# Patient Record
Sex: FEMALE | Race: BLACK OR AFRICAN AMERICAN | NOT HISPANIC OR LATINO | URBAN - METROPOLITAN AREA
[De-identification: names, ages, dates, MRNs, and addresses within clinical notes are randomized per-mention and may not be internally consistent; named-entity substitution may affect disease eponyms.]

---

## 2022-01-01 ENCOUNTER — INPATIENT (INPATIENT)
Facility: HOSPITAL | Age: 67
LOS: 8 days | Discharge: EXPIRED | End: 2022-12-24
Attending: INTERNAL MEDICINE | Admitting: INTERNAL MEDICINE
Payer: MEDICAID

## 2022-01-01 ENCOUNTER — EMERGENCY (EMERGENCY)
Facility: HOSPITAL | Age: 67
LOS: 0 days | Discharge: ANOTHER HOSPITAL | End: 2022-12-15
Attending: EMERGENCY MEDICINE

## 2022-01-01 VITALS
SYSTOLIC BLOOD PRESSURE: 104 MMHG | RESPIRATION RATE: 20 BRPM | DIASTOLIC BLOOD PRESSURE: 61 MMHG | TEMPERATURE: 98 F | HEART RATE: 84 BPM | OXYGEN SATURATION: 95 %

## 2022-01-01 VITALS
WEIGHT: 160.06 LBS | RESPIRATION RATE: 18 BRPM | DIASTOLIC BLOOD PRESSURE: 71 MMHG | HEIGHT: 67 IN | TEMPERATURE: 97 F | HEART RATE: 93 BPM | OXYGEN SATURATION: 100 % | SYSTOLIC BLOOD PRESSURE: 108 MMHG

## 2022-01-01 VITALS
TEMPERATURE: 97 F | RESPIRATION RATE: 16 BRPM | DIASTOLIC BLOOD PRESSURE: 60 MMHG | SYSTOLIC BLOOD PRESSURE: 100 MMHG | OXYGEN SATURATION: 97 % | HEART RATE: 82 BPM

## 2022-01-01 DIAGNOSIS — N17.9 ACUTE KIDNEY FAILURE, UNSPECIFIED: ICD-10-CM

## 2022-01-01 DIAGNOSIS — Z71.89 OTHER SPECIFIED COUNSELING: ICD-10-CM

## 2022-01-01 DIAGNOSIS — K83.8 OTHER SPECIFIED DISEASES OF BILIARY TRACT: ICD-10-CM

## 2022-01-01 DIAGNOSIS — R53.1 WEAKNESS: ICD-10-CM

## 2022-01-01 DIAGNOSIS — E87.5 HYPERKALEMIA: ICD-10-CM

## 2022-01-01 DIAGNOSIS — Z51.5 ENCOUNTER FOR PALLIATIVE CARE: ICD-10-CM

## 2022-01-01 DIAGNOSIS — C55 MALIGNANT NEOPLASM OF UTERUS, PART UNSPECIFIED: ICD-10-CM

## 2022-01-01 DIAGNOSIS — E80.6 OTHER DISORDERS OF BILIRUBIN METABOLISM: ICD-10-CM

## 2022-01-01 DIAGNOSIS — Z90.710 ACQUIRED ABSENCE OF BOTH CERVIX AND UTERUS: ICD-10-CM

## 2022-01-01 DIAGNOSIS — R11.10 VOMITING, UNSPECIFIED: ICD-10-CM

## 2022-01-01 DIAGNOSIS — Z85.42 PERSONAL HISTORY OF MALIGNANT NEOPLASM OF OTHER PARTS OF UTERUS: ICD-10-CM

## 2022-01-01 DIAGNOSIS — E87.20 ACIDOSIS, UNSPECIFIED: ICD-10-CM

## 2022-01-01 DIAGNOSIS — Z90.710 ACQUIRED ABSENCE OF BOTH CERVIX AND UTERUS: Chronic | ICD-10-CM

## 2022-01-01 DIAGNOSIS — C22.1 INTRAHEPATIC BILE DUCT CARCINOMA: ICD-10-CM

## 2022-01-01 DIAGNOSIS — R93.2 ABNORMAL FINDINGS ON DIAGNOSTIC IMAGING OF LIVER AND BILIARY TRACT: ICD-10-CM

## 2022-01-01 DIAGNOSIS — Z20.822 CONTACT WITH AND (SUSPECTED) EXPOSURE TO COVID-19: ICD-10-CM

## 2022-01-01 LAB
-  AMIKACIN: SIGNIFICANT CHANGE UP
-  AMOXICILLIN/CLAVULANIC ACID: SIGNIFICANT CHANGE UP
-  AMPICILLIN/SULBACTAM: SIGNIFICANT CHANGE UP
-  AMPICILLIN: SIGNIFICANT CHANGE UP
-  AZTREONAM: SIGNIFICANT CHANGE UP
-  CEFAZOLIN: SIGNIFICANT CHANGE UP
-  CEFEPIME: SIGNIFICANT CHANGE UP
-  CEFOXITIN: SIGNIFICANT CHANGE UP
-  CEFTRIAXONE: SIGNIFICANT CHANGE UP
-  CIPROFLOXACIN: SIGNIFICANT CHANGE UP
-  ERTAPENEM: SIGNIFICANT CHANGE UP
-  GENTAMICIN: SIGNIFICANT CHANGE UP
-  IMIPENEM: SIGNIFICANT CHANGE UP
-  LEVOFLOXACIN: SIGNIFICANT CHANGE UP
-  MEROPENEM: SIGNIFICANT CHANGE UP
-  NITROFURANTOIN: SIGNIFICANT CHANGE UP
-  PIPERACILLIN/TAZOBACTAM: SIGNIFICANT CHANGE UP
-  TOBRAMYCIN: SIGNIFICANT CHANGE UP
-  TRIMETHOPRIM/SULFAMETHOXAZOLE: SIGNIFICANT CHANGE UP
ACANTHOCYTES BLD QL SMEAR: SLIGHT — SIGNIFICANT CHANGE UP
ALBUMIN SERPL ELPH-MCNC: 1.9 G/DL — LOW (ref 3.3–5)
ALBUMIN SERPL ELPH-MCNC: 2 G/DL — LOW (ref 3.3–5)
ALBUMIN SERPL ELPH-MCNC: 2.1 G/DL — LOW (ref 3.3–5)
ALBUMIN SERPL ELPH-MCNC: 2.1 G/DL — LOW (ref 3.3–5)
ALBUMIN SERPL ELPH-MCNC: 2.2 G/DL — LOW (ref 3.3–5)
ALBUMIN SERPL ELPH-MCNC: 2.3 G/DL — LOW (ref 3.3–5)
ALBUMIN SERPL ELPH-MCNC: 2.3 G/DL — LOW (ref 3.3–5)
ALBUMIN SERPL ELPH-MCNC: 2.4 G/DL — LOW (ref 3.3–5)
ALBUMIN SERPL ELPH-MCNC: 2.6 G/DL — LOW (ref 3.3–5)
ALBUMIN SERPL ELPH-MCNC: 2.7 G/DL — LOW (ref 3.3–5)
ALBUMIN SERPL ELPH-MCNC: 2.8 G/DL — LOW (ref 3.3–5)
ALBUMIN SERPL ELPH-MCNC: 2.8 G/DL — LOW (ref 3.3–5)
ALP SERPL-CCNC: 1003 U/L — HIGH (ref 40–120)
ALP SERPL-CCNC: 1057 U/L — HIGH (ref 40–120)
ALP SERPL-CCNC: 1341 U/L — HIGH (ref 40–120)
ALP SERPL-CCNC: 392 U/L — HIGH (ref 40–120)
ALP SERPL-CCNC: 405 U/L — HIGH (ref 40–120)
ALP SERPL-CCNC: 409 U/L — HIGH (ref 40–120)
ALP SERPL-CCNC: 427 U/L — HIGH (ref 40–120)
ALP SERPL-CCNC: 475 U/L — HIGH (ref 40–120)
ALP SERPL-CCNC: 504 U/L — HIGH (ref 40–120)
ALP SERPL-CCNC: 514 U/L — HIGH (ref 40–120)
ALP SERPL-CCNC: 516 U/L — HIGH (ref 40–120)
ALP SERPL-CCNC: 535 U/L — HIGH (ref 40–120)
ALP SERPL-CCNC: 556 U/L — HIGH (ref 40–120)
ALP SERPL-CCNC: 600 U/L — HIGH (ref 40–120)
ALP SERPL-CCNC: 797 U/L — HIGH (ref 40–120)
ALP SERPL-CCNC: 980 U/L — HIGH (ref 40–120)
ALT FLD-CCNC: 101 U/L — HIGH (ref 4–33)
ALT FLD-CCNC: 1018 U/L — HIGH (ref 4–33)
ALT FLD-CCNC: 109 U/L — HIGH (ref 4–33)
ALT FLD-CCNC: 1378 U/L — HIGH (ref 4–33)
ALT FLD-CCNC: 1794 U/L — HIGH (ref 4–33)
ALT FLD-CCNC: 1946 U/L — HIGH (ref 4–33)
ALT FLD-CCNC: 2015 U/L — HIGH (ref 4–33)
ALT FLD-CCNC: 34 U/L — HIGH (ref 4–33)
ALT FLD-CCNC: 36 U/L — HIGH (ref 4–33)
ALT FLD-CCNC: 36 U/L — HIGH (ref 4–33)
ALT FLD-CCNC: 37 U/L — HIGH (ref 4–33)
ALT FLD-CCNC: 38 U/L — HIGH (ref 4–33)
ALT FLD-CCNC: 41 U/L — HIGH (ref 4–33)
ALT FLD-CCNC: 423 U/L — HIGH (ref 4–33)
ALT FLD-CCNC: 43 U/L — SIGNIFICANT CHANGE UP (ref 12–78)
ALT FLD-CCNC: 47 U/L — SIGNIFICANT CHANGE UP (ref 12–78)
AMMONIA BLD-MCNC: 20 UMOL/L — SIGNIFICANT CHANGE UP (ref 11–55)
AMMONIA BLD-MCNC: 21 UMOL/L — SIGNIFICANT CHANGE UP (ref 11–55)
AMMONIA BLD-MCNC: 28 UMOL/L — SIGNIFICANT CHANGE UP (ref 11–55)
AMMONIA BLD-MCNC: 29 UMOL/L — SIGNIFICANT CHANGE UP (ref 11–55)
AMMONIA BLD-MCNC: 47 UMOL/L — SIGNIFICANT CHANGE UP (ref 11–55)
AMYLASE P1 CFR SERPL: 208 U/L — HIGH (ref 25–115)
ANION GAP SERPL CALC-SCNC: 13 MMOL/L — SIGNIFICANT CHANGE UP (ref 5–17)
ANION GAP SERPL CALC-SCNC: 14 MMOL/L — SIGNIFICANT CHANGE UP (ref 5–17)
ANION GAP SERPL CALC-SCNC: 15 MMOL/L — HIGH (ref 7–14)
ANION GAP SERPL CALC-SCNC: 18 MMOL/L — HIGH (ref 7–14)
ANION GAP SERPL CALC-SCNC: 19 MMOL/L — HIGH (ref 7–14)
ANION GAP SERPL CALC-SCNC: 19 MMOL/L — HIGH (ref 7–14)
ANION GAP SERPL CALC-SCNC: 20 MMOL/L — HIGH (ref 7–14)
ANION GAP SERPL CALC-SCNC: 21 MMOL/L — HIGH (ref 7–14)
ANION GAP SERPL CALC-SCNC: 22 MMOL/L — HIGH (ref 7–14)
ANION GAP SERPL CALC-SCNC: 22 MMOL/L — HIGH (ref 7–14)
ANION GAP SERPL CALC-SCNC: 23 MMOL/L — HIGH (ref 7–14)
ANION GAP SERPL CALC-SCNC: 24 MMOL/L — HIGH (ref 7–14)
ANION GAP SERPL CALC-SCNC: 27 MMOL/L — HIGH (ref 7–14)
ANISOCYTOSIS BLD QL: SIGNIFICANT CHANGE UP
ANISOCYTOSIS BLD QL: SLIGHT — SIGNIFICANT CHANGE UP
APPEARANCE UR: ABNORMAL
APPEARANCE UR: ABNORMAL
APPEARANCE UR: CLEAR — SIGNIFICANT CHANGE UP
APTT BLD: 39 SEC — HIGH (ref 27–36.3)
APTT BLD: 40.8 SEC — HIGH (ref 27–36.3)
APTT BLD: 43.6 SEC — HIGH (ref 27–36.3)
APTT BLD: 44.4 SEC — HIGH (ref 27–36.3)
APTT BLD: 44.4 SEC — HIGH (ref 27–36.3)
APTT BLD: 44.5 SEC — HIGH (ref 27–36.3)
APTT BLD: 47.6 SEC — HIGH (ref 27–36.3)
APTT BLD: 47.9 SEC — HIGH (ref 27.5–35.5)
APTT BLD: 49.2 SEC — HIGH (ref 27–36.3)
APTT BLD: 51.3 SEC — HIGH (ref 27–36.3)
APTT BLD: 56.4 SEC — HIGH (ref 27–36.3)
APTT BLD: 68.8 SEC — HIGH (ref 27–36.3)
AST SERPL-CCNC: 111 U/L — HIGH (ref 4–32)
AST SERPL-CCNC: 123 U/L — HIGH (ref 4–32)
AST SERPL-CCNC: 123 U/L — HIGH (ref 4–32)
AST SERPL-CCNC: 126 U/L — HIGH (ref 4–32)
AST SERPL-CCNC: 127 U/L — HIGH (ref 4–32)
AST SERPL-CCNC: 132 U/L — HIGH (ref 4–32)
AST SERPL-CCNC: 146 U/L — HIGH (ref 15–37)
AST SERPL-CCNC: 158 U/L — HIGH (ref 15–37)
AST SERPL-CCNC: 1728 U/L — HIGH (ref 4–32)
AST SERPL-CCNC: 2757 U/L — HIGH (ref 4–32)
AST SERPL-CCNC: 428 U/L — HIGH (ref 4–32)
AST SERPL-CCNC: 444 U/L — HIGH (ref 4–32)
AST SERPL-CCNC: 4837 U/L — HIGH (ref 4–32)
AST SERPL-CCNC: 6682 U/L — HIGH (ref 4–32)
AST SERPL-CCNC: >7000 U/L — HIGH (ref 4–32)
AST SERPL-CCNC: >7000 U/L — SIGNIFICANT CHANGE UP (ref 4–32)
BACTERIA # UR AUTO: ABNORMAL
BASE EXCESS BLDV CALC-SCNC: -11.9 MMOL/L — LOW (ref -2–3)
BASE EXCESS BLDV CALC-SCNC: -4.5 MMOL/L — LOW (ref -2–3)
BASOPHILS # BLD AUTO: 0 K/UL — SIGNIFICANT CHANGE UP (ref 0–0.2)
BASOPHILS # BLD AUTO: 0.03 K/UL — SIGNIFICANT CHANGE UP (ref 0–0.2)
BASOPHILS # BLD AUTO: 0.03 K/UL — SIGNIFICANT CHANGE UP (ref 0–0.2)
BASOPHILS # BLD AUTO: 0.05 K/UL — SIGNIFICANT CHANGE UP (ref 0–0.2)
BASOPHILS # BLD AUTO: 0.06 K/UL — SIGNIFICANT CHANGE UP (ref 0–0.2)
BASOPHILS # BLD AUTO: 0.06 K/UL — SIGNIFICANT CHANGE UP (ref 0–0.2)
BASOPHILS # BLD AUTO: 0.07 K/UL — SIGNIFICANT CHANGE UP (ref 0–0.2)
BASOPHILS # BLD AUTO: 0.13 K/UL — SIGNIFICANT CHANGE UP (ref 0–0.2)
BASOPHILS # BLD AUTO: 0.19 K/UL — SIGNIFICANT CHANGE UP (ref 0–0.2)
BASOPHILS NFR BLD AUTO: 0 % — SIGNIFICANT CHANGE UP (ref 0–2)
BASOPHILS NFR BLD AUTO: 0.3 % — SIGNIFICANT CHANGE UP (ref 0–2)
BASOPHILS NFR BLD AUTO: 0.3 % — SIGNIFICANT CHANGE UP (ref 0–2)
BASOPHILS NFR BLD AUTO: 0.4 % — SIGNIFICANT CHANGE UP (ref 0–2)
BASOPHILS NFR BLD AUTO: 0.4 % — SIGNIFICANT CHANGE UP (ref 0–2)
BASOPHILS NFR BLD AUTO: 0.5 % — SIGNIFICANT CHANGE UP (ref 0–2)
BASOPHILS NFR BLD AUTO: 0.6 % — SIGNIFICANT CHANGE UP (ref 0–2)
BASOPHILS NFR BLD AUTO: 0.6 % — SIGNIFICANT CHANGE UP (ref 0–2)
BASOPHILS NFR BLD AUTO: 0.7 % — SIGNIFICANT CHANGE UP (ref 0–2)
BASOPHILS NFR BLD AUTO: 0.7 % — SIGNIFICANT CHANGE UP (ref 0–2)
BASOPHILS NFR BLD AUTO: 0.9 % — SIGNIFICANT CHANGE UP (ref 0–2)
BASOPHILS NFR BLD AUTO: 1 % — SIGNIFICANT CHANGE UP (ref 0–2)
BASOPHILS NFR BLD AUTO: 1.1 % — SIGNIFICANT CHANGE UP (ref 0–2)
BILIRUB DIRECT SERPL-MCNC: 35.7 MG/DL — HIGH (ref 0–0.3)
BILIRUB INDIRECT FLD-MCNC: 5.4 MG/DL — HIGH (ref 0.2–1)
BILIRUB SERPL-MCNC: 32.3 MG/DL — HIGH (ref 0.2–1.2)
BILIRUB SERPL-MCNC: 32.3 MG/DL — HIGH (ref 0.2–1.2)
BILIRUB SERPL-MCNC: 32.7 MG/DL — HIGH (ref 0.2–1.2)
BILIRUB SERPL-MCNC: 32.9 MG/DL — HIGH (ref 0.2–1.2)
BILIRUB SERPL-MCNC: 33.1 MG/DL — HIGH (ref 0.2–1.2)
BILIRUB SERPL-MCNC: 33.3 MG/DL — HIGH (ref 0.2–1.2)
BILIRUB SERPL-MCNC: 33.7 MG/DL — HIGH (ref 0.2–1.2)
BILIRUB SERPL-MCNC: 34.4 MG/DL — HIGH (ref 0.2–1.2)
BILIRUB SERPL-MCNC: 34.5 MG/DL — HIGH (ref 0.2–1.2)
BILIRUB SERPL-MCNC: 34.8 MG/DL — HIGH (ref 0.2–1.2)
BILIRUB SERPL-MCNC: 34.9 MG/DL — HIGH (ref 0.2–1.2)
BILIRUB SERPL-MCNC: 35 MG/DL — HIGH (ref 0.2–1.2)
BILIRUB SERPL-MCNC: 35.4 MG/DL — HIGH (ref 0.2–1.2)
BILIRUB SERPL-MCNC: 35.6 MG/DL — HIGH (ref 0.2–1.2)
BILIRUB SERPL-MCNC: 36.5 MG/DL — HIGH (ref 0.2–1.2)
BILIRUB SERPL-MCNC: 41.1 MG/DL — HIGH (ref 0.2–1.2)
BILIRUB SERPL-MCNC: 41.1 MG/DL — HIGH (ref 0.2–1.2)
BILIRUB UR-MCNC: ABNORMAL
BLD GP AB SCN SERPL QL: NEGATIVE — SIGNIFICANT CHANGE UP
BLOOD GAS ARTERIAL - LYTES,HGB,ICA,LACT RESULT: SIGNIFICANT CHANGE UP
BLOOD GAS ARTERIAL COMPREHENSIVE RESULT: SIGNIFICANT CHANGE UP
BLOOD GAS VENOUS COMPREHENSIVE RESULT: SIGNIFICANT CHANGE UP
BUN SERPL-MCNC: 107 MG/DL — HIGH (ref 7–23)
BUN SERPL-MCNC: 108 MG/DL — HIGH (ref 7–23)
BUN SERPL-MCNC: 52 MG/DL — HIGH (ref 7–23)
BUN SERPL-MCNC: 55 MG/DL — HIGH (ref 7–23)
BUN SERPL-MCNC: 58 MG/DL — HIGH (ref 7–23)
BUN SERPL-MCNC: 59 MG/DL — HIGH (ref 7–23)
BUN SERPL-MCNC: 64 MG/DL — HIGH (ref 7–23)
BUN SERPL-MCNC: 65 MG/DL — HIGH (ref 7–23)
BUN SERPL-MCNC: 66 MG/DL — HIGH (ref 7–23)
BUN SERPL-MCNC: 66 MG/DL — HIGH (ref 7–23)
BUN SERPL-MCNC: 68 MG/DL — HIGH (ref 7–23)
BUN SERPL-MCNC: 68 MG/DL — HIGH (ref 7–23)
BUN SERPL-MCNC: 71 MG/DL — HIGH (ref 7–23)
BUN SERPL-MCNC: 76 MG/DL — HIGH (ref 7–23)
BUN SERPL-MCNC: 80 MG/DL — HIGH (ref 7–23)
BUN SERPL-MCNC: 94 MG/DL — HIGH (ref 7–23)
BUN SERPL-MCNC: 95 MG/DL — HIGH (ref 7–23)
BURR CELLS BLD QL SMEAR: PRESENT — SIGNIFICANT CHANGE UP
CALCIUM SERPL-MCNC: 10.1 MG/DL — SIGNIFICANT CHANGE UP (ref 8.5–10.1)
CALCIUM SERPL-MCNC: 8.4 MG/DL — SIGNIFICANT CHANGE UP (ref 8.4–10.5)
CALCIUM SERPL-MCNC: 8.5 MG/DL — SIGNIFICANT CHANGE UP (ref 8.4–10.5)
CALCIUM SERPL-MCNC: 8.6 MG/DL — SIGNIFICANT CHANGE UP (ref 8.4–10.5)
CALCIUM SERPL-MCNC: 8.7 MG/DL — SIGNIFICANT CHANGE UP (ref 8.4–10.5)
CALCIUM SERPL-MCNC: 8.7 MG/DL — SIGNIFICANT CHANGE UP (ref 8.4–10.5)
CALCIUM SERPL-MCNC: 8.8 MG/DL — SIGNIFICANT CHANGE UP (ref 8.4–10.5)
CALCIUM SERPL-MCNC: 8.9 MG/DL — SIGNIFICANT CHANGE UP (ref 8.4–10.5)
CALCIUM SERPL-MCNC: 9.1 MG/DL — SIGNIFICANT CHANGE UP (ref 8.4–10.5)
CALCIUM SERPL-MCNC: 9.1 MG/DL — SIGNIFICANT CHANGE UP (ref 8.4–10.5)
CALCIUM SERPL-MCNC: 9.2 MG/DL — SIGNIFICANT CHANGE UP (ref 8.4–10.5)
CALCIUM SERPL-MCNC: 9.2 MG/DL — SIGNIFICANT CHANGE UP (ref 8.4–10.5)
CALCIUM SERPL-MCNC: 9.3 MG/DL — SIGNIFICANT CHANGE UP (ref 8.4–10.5)
CALCIUM SERPL-MCNC: 9.4 MG/DL — SIGNIFICANT CHANGE UP (ref 8.4–10.5)
CALCIUM SERPL-MCNC: 9.8 MG/DL — SIGNIFICANT CHANGE UP (ref 8.5–10.1)
CANCER AG125 SERPL-ACNC: 1031 U/ML — HIGH
CANCER AG19-9 SERPL-ACNC: 15 U/ML — SIGNIFICANT CHANGE UP
CEA SERPL-MCNC: 1.8 NG/ML — SIGNIFICANT CHANGE UP (ref 1–3.8)
CHLORIDE BLDV-SCNC: 103 MMOL/L — SIGNIFICANT CHANGE UP (ref 96–108)
CHLORIDE SERPL-SCNC: 100 MMOL/L — SIGNIFICANT CHANGE UP (ref 98–107)
CHLORIDE SERPL-SCNC: 103 MMOL/L — SIGNIFICANT CHANGE UP (ref 96–108)
CHLORIDE SERPL-SCNC: 93 MMOL/L — LOW (ref 98–107)
CHLORIDE SERPL-SCNC: 94 MMOL/L — LOW (ref 98–107)
CHLORIDE SERPL-SCNC: 95 MMOL/L — LOW (ref 98–107)
CHLORIDE SERPL-SCNC: 96 MMOL/L — LOW (ref 98–107)
CHLORIDE SERPL-SCNC: 97 MMOL/L — LOW (ref 98–107)
CHLORIDE SERPL-SCNC: 97 MMOL/L — LOW (ref 98–107)
CHLORIDE SERPL-SCNC: 98 MMOL/L — SIGNIFICANT CHANGE UP (ref 98–107)
CHLORIDE SERPL-SCNC: 99 MMOL/L — SIGNIFICANT CHANGE UP (ref 96–108)
CK MB BLD-MCNC: <1.1 % — SIGNIFICANT CHANGE UP (ref 0–2.5)
CK MB CFR SERPL CALC: <1 NG/ML — SIGNIFICANT CHANGE UP
CK SERPL-CCNC: 151 U/L — SIGNIFICANT CHANGE UP (ref 25–170)
CK SERPL-CCNC: 173 U/L — HIGH (ref 25–170)
CK SERPL-CCNC: 87 U/L — SIGNIFICANT CHANGE UP (ref 25–170)
CO2 BLDV-SCNC: 14.7 MMOL/L — LOW (ref 22–26)
CO2 BLDV-SCNC: 21.4 MMOL/L — LOW (ref 22–26)
CO2 SERPL-SCNC: 11 MMOL/L — LOW (ref 22–31)
CO2 SERPL-SCNC: 13 MMOL/L — LOW (ref 22–31)
CO2 SERPL-SCNC: 14 MMOL/L — LOW (ref 22–31)
CO2 SERPL-SCNC: 16 MMOL/L — LOW (ref 22–31)
CO2 SERPL-SCNC: 17 MMOL/L — LOW (ref 22–31)
CO2 SERPL-SCNC: 17 MMOL/L — LOW (ref 22–31)
CO2 SERPL-SCNC: 18 MMOL/L — LOW (ref 22–31)
CO2 SERPL-SCNC: 18 MMOL/L — LOW (ref 22–31)
CO2 SERPL-SCNC: 19 MMOL/L — LOW (ref 22–31)
CO2 SERPL-SCNC: 21 MMOL/L — LOW (ref 22–31)
COLOR SPEC: ABNORMAL
CORTIS AM PEAK SERPL-MCNC: 14.8 UG/DL — SIGNIFICANT CHANGE UP (ref 6–18.4)
CORTIS F PM SERPL-MCNC: 14.7 UG/DL — HIGH (ref 2.7–10.5)
CREAT ?TM UR-MCNC: 105 MG/DL — SIGNIFICANT CHANGE UP
CREAT ?TM UR-MCNC: 115 MG/DL — SIGNIFICANT CHANGE UP
CREAT SERPL-MCNC: 3.81 MG/DL — HIGH (ref 0.5–1.3)
CREAT SERPL-MCNC: 4.51 MG/DL — HIGH (ref 0.5–1.3)
CREAT SERPL-MCNC: 4.52 MG/DL — HIGH (ref 0.5–1.3)
CREAT SERPL-MCNC: 4.52 MG/DL — HIGH (ref 0.5–1.3)
CREAT SERPL-MCNC: 4.7 MG/DL — HIGH (ref 0.5–1.3)
CREAT SERPL-MCNC: 4.71 MG/DL — HIGH (ref 0.5–1.3)
CREAT SERPL-MCNC: 4.82 MG/DL — HIGH (ref 0.5–1.3)
CREAT SERPL-MCNC: 4.88 MG/DL — HIGH (ref 0.5–1.3)
CREAT SERPL-MCNC: 5.19 MG/DL — HIGH (ref 0.5–1.3)
CREAT SERPL-MCNC: 5.25 MG/DL — HIGH (ref 0.5–1.3)
CREAT SERPL-MCNC: 5.36 MG/DL — HIGH (ref 0.5–1.3)
CREAT SERPL-MCNC: 5.54 MG/DL — HIGH (ref 0.5–1.3)
CREAT SERPL-MCNC: 5.65 MG/DL — HIGH (ref 0.5–1.3)
CREAT SERPL-MCNC: 5.69 MG/DL — HIGH (ref 0.5–1.3)
CREAT SERPL-MCNC: 5.86 MG/DL — HIGH (ref 0.5–1.3)
CREAT SERPL-MCNC: 7.19 MG/DL — HIGH (ref 0.5–1.3)
CREAT SERPL-MCNC: 7.29 MG/DL — HIGH (ref 0.5–1.3)
CULTURE RESULTS: SIGNIFICANT CHANGE UP
DACRYOCYTES BLD QL SMEAR: SLIGHT — SIGNIFICANT CHANGE UP
DIALYSIS INSTRUMENT RESULT - HEPATITIS B SURFACE ANTIGEN: NEGATIVE — SIGNIFICANT CHANGE UP
DIFF PNL FLD: ABNORMAL
EGFR: 10 ML/MIN/1.73M2 — LOW
EGFR: 12 ML/MIN/1.73M2 — LOW
EGFR: 6 ML/MIN/1.73M2 — LOW
EGFR: 6 ML/MIN/1.73M2 — LOW
EGFR: 7 ML/MIN/1.73M2 — LOW
EGFR: 8 ML/MIN/1.73M2 — LOW
EGFR: 9 ML/MIN/1.73M2 — LOW
ELLIPTOCYTES BLD QL SMEAR: SLIGHT — SIGNIFICANT CHANGE UP
EOSINOPHIL # BLD AUTO: 0 K/UL — SIGNIFICANT CHANGE UP (ref 0–0.5)
EOSINOPHIL # BLD AUTO: 0.02 K/UL — SIGNIFICANT CHANGE UP (ref 0–0.5)
EOSINOPHIL # BLD AUTO: 0.04 K/UL — SIGNIFICANT CHANGE UP (ref 0–0.5)
EOSINOPHIL # BLD AUTO: 0.05 K/UL — SIGNIFICANT CHANGE UP (ref 0–0.5)
EOSINOPHIL # BLD AUTO: 0.05 K/UL — SIGNIFICANT CHANGE UP (ref 0–0.5)
EOSINOPHIL # BLD AUTO: 0.07 K/UL — SIGNIFICANT CHANGE UP (ref 0–0.5)
EOSINOPHIL # BLD AUTO: 0.08 K/UL — SIGNIFICANT CHANGE UP (ref 0–0.5)
EOSINOPHIL # BLD AUTO: 0.09 K/UL — SIGNIFICANT CHANGE UP (ref 0–0.5)
EOSINOPHIL # BLD AUTO: 0.12 K/UL — SIGNIFICANT CHANGE UP (ref 0–0.5)
EOSINOPHIL # BLD AUTO: 0.33 K/UL — SIGNIFICANT CHANGE UP (ref 0–0.5)
EOSINOPHIL NFR BLD AUTO: 0 % — SIGNIFICANT CHANGE UP (ref 0–6)
EOSINOPHIL NFR BLD AUTO: 0.3 % — SIGNIFICANT CHANGE UP (ref 0–6)
EOSINOPHIL NFR BLD AUTO: 0.4 % — SIGNIFICANT CHANGE UP (ref 0–6)
EOSINOPHIL NFR BLD AUTO: 0.4 % — SIGNIFICANT CHANGE UP (ref 0–6)
EOSINOPHIL NFR BLD AUTO: 0.5 % — SIGNIFICANT CHANGE UP (ref 0–6)
EOSINOPHIL NFR BLD AUTO: 0.6 % — SIGNIFICANT CHANGE UP (ref 0–6)
EOSINOPHIL NFR BLD AUTO: 0.6 % — SIGNIFICANT CHANGE UP (ref 0–6)
EOSINOPHIL NFR BLD AUTO: 0.7 % — SIGNIFICANT CHANGE UP (ref 0–6)
EOSINOPHIL NFR BLD AUTO: 0.8 % — SIGNIFICANT CHANGE UP (ref 0–6)
EOSINOPHIL NFR BLD AUTO: 0.8 % — SIGNIFICANT CHANGE UP (ref 0–6)
EOSINOPHIL NFR BLD AUTO: 0.9 % — SIGNIFICANT CHANGE UP (ref 0–6)
EOSINOPHIL NFR BLD AUTO: 2.6 % — SIGNIFICANT CHANGE UP (ref 0–6)
EPI CELLS # UR: 1 /HPF — SIGNIFICANT CHANGE UP (ref 0–5)
EPI CELLS # UR: 5 /HPF — SIGNIFICANT CHANGE UP (ref 0–5)
EPI CELLS # UR: ABNORMAL
FIBRINOGEN PPP-MCNC: 339 MG/DL — SIGNIFICANT CHANGE UP (ref 200–465)
FIBRINOGEN PPP-MCNC: 384 MG/DL — SIGNIFICANT CHANGE UP (ref 200–465)
FIBRINOGEN PPP-MCNC: 392 MG/DL — SIGNIFICANT CHANGE UP (ref 200–465)
FLUAV AG NPH QL: SIGNIFICANT CHANGE UP
FLUBV AG NPH QL: SIGNIFICANT CHANGE UP
GAS PNL BLDV: 131 MMOL/L — LOW (ref 136–145)
GIANT PLATELETS BLD QL SMEAR: PRESENT — SIGNIFICANT CHANGE UP
GLUCOSE BLDC GLUCOMTR-MCNC: 101 MG/DL — HIGH (ref 70–99)
GLUCOSE BLDC GLUCOMTR-MCNC: 112 MG/DL — HIGH (ref 70–99)
GLUCOSE BLDC GLUCOMTR-MCNC: 117 MG/DL — HIGH (ref 70–99)
GLUCOSE BLDC GLUCOMTR-MCNC: 155 MG/DL — HIGH (ref 70–99)
GLUCOSE BLDC GLUCOMTR-MCNC: 60 MG/DL — LOW (ref 70–99)
GLUCOSE BLDC GLUCOMTR-MCNC: 63 MG/DL — LOW (ref 70–99)
GLUCOSE BLDC GLUCOMTR-MCNC: 64 MG/DL — LOW (ref 70–99)
GLUCOSE BLDC GLUCOMTR-MCNC: 69 MG/DL — LOW (ref 70–99)
GLUCOSE BLDC GLUCOMTR-MCNC: 76 MG/DL — SIGNIFICANT CHANGE UP (ref 70–99)
GLUCOSE BLDC GLUCOMTR-MCNC: 80 MG/DL — SIGNIFICANT CHANGE UP (ref 70–99)
GLUCOSE BLDC GLUCOMTR-MCNC: 86 MG/DL — SIGNIFICANT CHANGE UP (ref 70–99)
GLUCOSE BLDC GLUCOMTR-MCNC: 92 MG/DL — SIGNIFICANT CHANGE UP (ref 70–99)
GLUCOSE BLDC GLUCOMTR-MCNC: 95 MG/DL — SIGNIFICANT CHANGE UP (ref 70–99)
GLUCOSE BLDC GLUCOMTR-MCNC: 97 MG/DL — SIGNIFICANT CHANGE UP (ref 70–99)
GLUCOSE BLDV-MCNC: 76 MG/DL — SIGNIFICANT CHANGE UP (ref 70–99)
GLUCOSE SERPL-MCNC: 105 MG/DL — HIGH (ref 70–99)
GLUCOSE SERPL-MCNC: 116 MG/DL — HIGH (ref 70–99)
GLUCOSE SERPL-MCNC: 117 MG/DL — HIGH (ref 70–99)
GLUCOSE SERPL-MCNC: 120 MG/DL — HIGH (ref 70–99)
GLUCOSE SERPL-MCNC: 121 MG/DL — HIGH (ref 70–99)
GLUCOSE SERPL-MCNC: 127 MG/DL — HIGH (ref 70–99)
GLUCOSE SERPL-MCNC: 127 MG/DL — HIGH (ref 70–99)
GLUCOSE SERPL-MCNC: 72 MG/DL — SIGNIFICANT CHANGE UP (ref 70–99)
GLUCOSE SERPL-MCNC: 73 MG/DL — SIGNIFICANT CHANGE UP (ref 70–99)
GLUCOSE SERPL-MCNC: 78 MG/DL — SIGNIFICANT CHANGE UP (ref 70–99)
GLUCOSE SERPL-MCNC: 80 MG/DL — SIGNIFICANT CHANGE UP (ref 70–99)
GLUCOSE SERPL-MCNC: 83 MG/DL — SIGNIFICANT CHANGE UP (ref 70–99)
GLUCOSE SERPL-MCNC: 85 MG/DL — SIGNIFICANT CHANGE UP (ref 70–99)
GLUCOSE SERPL-MCNC: 91 MG/DL — SIGNIFICANT CHANGE UP (ref 70–99)
GLUCOSE SERPL-MCNC: 93 MG/DL — SIGNIFICANT CHANGE UP (ref 70–99)
GLUCOSE SERPL-MCNC: 94 MG/DL — SIGNIFICANT CHANGE UP (ref 70–99)
GLUCOSE SERPL-MCNC: 94 MG/DL — SIGNIFICANT CHANGE UP (ref 70–99)
GLUCOSE UR QL: NEGATIVE MG/DL — SIGNIFICANT CHANGE UP
GLUCOSE UR QL: NEGATIVE — SIGNIFICANT CHANGE UP
GLUCOSE UR QL: NEGATIVE — SIGNIFICANT CHANGE UP
HBV CORE AB SER-ACNC: SIGNIFICANT CHANGE UP
HBV SURFACE AB SER-ACNC: <3 MIU/ML — LOW
HBV SURFACE AG SER-ACNC: SIGNIFICANT CHANGE UP
HCO3 BLDV-SCNC: 14 MMOL/L — LOW (ref 22–29)
HCO3 BLDV-SCNC: 20 MMOL/L — LOW (ref 22–29)
HCT VFR BLD CALC: 21.8 % — LOW (ref 34.5–45)
HCT VFR BLD CALC: 24.4 % — LOW (ref 34.5–45)
HCT VFR BLD CALC: 24.5 % — LOW (ref 34.5–45)
HCT VFR BLD CALC: 25.3 % — LOW (ref 34.5–45)
HCT VFR BLD CALC: 25.6 % — LOW (ref 34.5–45)
HCT VFR BLD CALC: 26.9 % — LOW (ref 34.5–45)
HCT VFR BLD CALC: 27.1 % — LOW (ref 34.5–45)
HCT VFR BLD CALC: 27.6 % — LOW (ref 34.5–45)
HCT VFR BLD CALC: 27.8 % — LOW (ref 34.5–45)
HCT VFR BLD CALC: 28 % — LOW (ref 34.5–45)
HCT VFR BLD CALC: 28.4 % — LOW (ref 34.5–45)
HCT VFR BLD CALC: 28.4 % — LOW (ref 34.5–45)
HCT VFR BLD CALC: 29 % — LOW (ref 34.5–45)
HCT VFR BLD CALC: 29.1 % — LOW (ref 34.5–45)
HCT VFR BLD CALC: 29.7 % — LOW (ref 34.5–45)
HCT VFR BLD CALC: 32.8 % — LOW (ref 34.5–45)
HCT VFR BLDA CALC: 30 % — LOW (ref 34.5–46.5)
HGB BLD CALC-MCNC: 10.1 G/DL — LOW (ref 11.5–15.5)
HGB BLD-MCNC: 10 G/DL — LOW (ref 11.5–15.5)
HGB BLD-MCNC: 10 G/DL — LOW (ref 11.5–15.5)
HGB BLD-MCNC: 10.1 G/DL — LOW (ref 11.5–15.5)
HGB BLD-MCNC: 10.2 G/DL — LOW (ref 11.5–15.5)
HGB BLD-MCNC: 11.8 G/DL — SIGNIFICANT CHANGE UP (ref 11.5–15.5)
HGB BLD-MCNC: 7.1 G/DL — LOW (ref 11.5–15.5)
HGB BLD-MCNC: 8.2 G/DL — LOW (ref 11.5–15.5)
HGB BLD-MCNC: 8.6 G/DL — LOW (ref 11.5–15.5)
HGB BLD-MCNC: 8.7 G/DL — LOW (ref 11.5–15.5)
HGB BLD-MCNC: 8.8 G/DL — LOW (ref 11.5–15.5)
HGB BLD-MCNC: 9 G/DL — LOW (ref 11.5–15.5)
HGB BLD-MCNC: 9.4 G/DL — LOW (ref 11.5–15.5)
HGB BLD-MCNC: 9.5 G/DL — LOW (ref 11.5–15.5)
HGB BLD-MCNC: 9.6 G/DL — LOW (ref 11.5–15.5)
HGB BLD-MCNC: 9.7 G/DL — LOW (ref 11.5–15.5)
HGB BLD-MCNC: 9.9 G/DL — LOW (ref 11.5–15.5)
HYPOCHROMIA BLD QL: SIGNIFICANT CHANGE UP
HYPOCHROMIA BLD QL: SLIGHT — SIGNIFICANT CHANGE UP
HYPOCHROMIA BLD QL: SLIGHT — SIGNIFICANT CHANGE UP
IANC: 10.9 K/UL — HIGH (ref 1.8–7.4)
IANC: 13.81 K/UL — HIGH (ref 1.8–7.4)
IANC: 14.37 K/UL — HIGH (ref 1.8–7.4)
IANC: 14.72 K/UL — HIGH (ref 1.8–7.4)
IANC: 15.55 K/UL — HIGH (ref 1.8–7.4)
IANC: 23.74 K/UL — HIGH (ref 1.8–7.4)
IANC: 4.71 K/UL — SIGNIFICANT CHANGE UP (ref 1.8–7.4)
IANC: 4.83 K/UL — SIGNIFICANT CHANGE UP (ref 1.8–7.4)
IANC: 5.18 K/UL — SIGNIFICANT CHANGE UP (ref 1.8–7.4)
IANC: 6.54 K/UL — SIGNIFICANT CHANGE UP (ref 1.8–7.4)
IANC: 8 K/UL — HIGH (ref 1.8–7.4)
IANC: 8.31 K/UL — HIGH (ref 1.8–7.4)
IANC: 8.49 K/UL — HIGH (ref 1.8–7.4)
IANC: 9.02 K/UL — HIGH (ref 1.8–7.4)
IANC: 9.05 K/UL — HIGH (ref 1.8–7.4)
IMM GRANULOCYTES NFR BLD AUTO: 1.9 % — HIGH (ref 0–0.9)
IMM GRANULOCYTES NFR BLD AUTO: 10.4 % — HIGH (ref 0–0.9)
IMM GRANULOCYTES NFR BLD AUTO: 13.6 % — HIGH (ref 0–0.9)
IMM GRANULOCYTES NFR BLD AUTO: 2.3 % — HIGH (ref 0–0.9)
IMM GRANULOCYTES NFR BLD AUTO: 2.4 % — HIGH (ref 0–0.9)
IMM GRANULOCYTES NFR BLD AUTO: 2.6 % — HIGH (ref 0–0.9)
IMM GRANULOCYTES NFR BLD AUTO: 2.6 % — HIGH (ref 0–0.9)
IMM GRANULOCYTES NFR BLD AUTO: 3.2 % — HIGH (ref 0–0.9)
IMM GRANULOCYTES NFR BLD AUTO: 4.3 % — HIGH (ref 0–0.9)
IMM GRANULOCYTES NFR BLD AUTO: 6.2 % — HIGH (ref 0–0.9)
IMM GRANULOCYTES NFR BLD AUTO: 8.6 % — HIGH (ref 0–0.9)
INR BLD: 1.52 RATIO — HIGH (ref 0.88–1.16)
INR BLD: 1.58 RATIO — HIGH (ref 0.88–1.16)
INR BLD: 1.64 RATIO — HIGH (ref 0.88–1.16)
INR BLD: 1.67 RATIO — HIGH (ref 0.88–1.16)
INR BLD: 1.72 RATIO — HIGH (ref 0.88–1.16)
INR BLD: 2.08 RATIO — HIGH (ref 0.88–1.16)
INR BLD: 2.37 RATIO — HIGH (ref 0.88–1.16)
INR BLD: 2.81 RATIO — HIGH (ref 0.88–1.16)
INR BLD: 6.01 RATIO — CRITICAL HIGH (ref 0.88–1.16)
KETONES UR-MCNC: ABNORMAL
KETONES UR-MCNC: ABNORMAL
KETONES UR-MCNC: NEGATIVE — SIGNIFICANT CHANGE UP
LACTATE BLDV-MCNC: 1.7 MMOL/L — SIGNIFICANT CHANGE UP (ref 0.5–2)
LACTATE BLDV-MCNC: 2.6 MMOL/L — HIGH (ref 0.5–2)
LACTATE SERPL-SCNC: 1.7 MMOL/L — SIGNIFICANT CHANGE UP (ref 0.7–2)
LEUKOCYTE ESTERASE UR-ACNC: ABNORMAL
LEUKOCYTE ESTERASE UR-ACNC: ABNORMAL
LEUKOCYTE ESTERASE UR-ACNC: NEGATIVE — SIGNIFICANT CHANGE UP
LIDOCAIN IGE QN: 1431 U/L — HIGH (ref 73–393)
LYMPHOCYTES # BLD AUTO: 0.44 K/UL — LOW (ref 1–3.3)
LYMPHOCYTES # BLD AUTO: 0.46 K/UL — LOW (ref 1–3.3)
LYMPHOCYTES # BLD AUTO: 0.47 K/UL — LOW (ref 1–3.3)
LYMPHOCYTES # BLD AUTO: 0.47 K/UL — LOW (ref 1–3.3)
LYMPHOCYTES # BLD AUTO: 0.48 K/UL — LOW (ref 1–3.3)
LYMPHOCYTES # BLD AUTO: 0.49 K/UL — LOW (ref 1–3.3)
LYMPHOCYTES # BLD AUTO: 0.51 K/UL — LOW (ref 1–3.3)
LYMPHOCYTES # BLD AUTO: 0.52 K/UL — LOW (ref 1–3.3)
LYMPHOCYTES # BLD AUTO: 0.59 K/UL — LOW (ref 1–3.3)
LYMPHOCYTES # BLD AUTO: 0.67 K/UL — LOW (ref 1–3.3)
LYMPHOCYTES # BLD AUTO: 0.73 K/UL — LOW (ref 1–3.3)
LYMPHOCYTES # BLD AUTO: 1.03 K/UL — SIGNIFICANT CHANGE UP (ref 1–3.3)
LYMPHOCYTES # BLD AUTO: 1.19 K/UL — SIGNIFICANT CHANGE UP (ref 1–3.3)
LYMPHOCYTES # BLD AUTO: 1.49 K/UL — SIGNIFICANT CHANGE UP (ref 1–3.3)
LYMPHOCYTES # BLD AUTO: 1.87 K/UL — SIGNIFICANT CHANGE UP (ref 1–3.3)
LYMPHOCYTES # BLD AUTO: 14.3 % — SIGNIFICANT CHANGE UP (ref 13–44)
LYMPHOCYTES # BLD AUTO: 3.5 % — LOW (ref 13–44)
LYMPHOCYTES # BLD AUTO: 3.5 % — LOW (ref 13–44)
LYMPHOCYTES # BLD AUTO: 4.3 % — LOW (ref 13–44)
LYMPHOCYTES # BLD AUTO: 4.6 % — LOW (ref 13–44)
LYMPHOCYTES # BLD AUTO: 4.66 K/UL — HIGH (ref 1–3.3)
LYMPHOCYTES # BLD AUTO: 5 % — LOW (ref 13–44)
LYMPHOCYTES # BLD AUTO: 5.8 % — LOW (ref 13–44)
LYMPHOCYTES # BLD AUTO: 5.9 % — LOW (ref 13–44)
LYMPHOCYTES # BLD AUTO: 6.3 % — LOW (ref 13–44)
LYMPHOCYTES # BLD AUTO: 6.6 % — LOW (ref 13–44)
LYMPHOCYTES # BLD AUTO: 7 % — LOW (ref 13–44)
LYMPHOCYTES # BLD AUTO: 7.4 % — LOW (ref 13–44)
LYMPHOCYTES # BLD AUTO: 7.5 % — LOW (ref 13–44)
LYMPHOCYTES # BLD AUTO: 7.6 % — LOW (ref 13–44)
LYMPHOCYTES # BLD AUTO: 7.9 % — LOW (ref 13–44)
LYMPHOCYTES # BLD AUTO: 8.8 % — LOW (ref 13–44)
MACROCYTES BLD QL: SIGNIFICANT CHANGE UP
MACROCYTES BLD QL: SLIGHT — SIGNIFICANT CHANGE UP
MAGNESIUM SERPL-MCNC: 2.3 MG/DL — SIGNIFICANT CHANGE UP (ref 1.6–2.6)
MAGNESIUM SERPL-MCNC: 2.4 MG/DL — SIGNIFICANT CHANGE UP (ref 1.6–2.6)
MAGNESIUM SERPL-MCNC: 2.4 MG/DL — SIGNIFICANT CHANGE UP (ref 1.6–2.6)
MAGNESIUM SERPL-MCNC: 2.5 MG/DL — SIGNIFICANT CHANGE UP (ref 1.6–2.6)
MAGNESIUM SERPL-MCNC: 2.5 MG/DL — SIGNIFICANT CHANGE UP (ref 1.6–2.6)
MAGNESIUM SERPL-MCNC: 2.6 MG/DL — SIGNIFICANT CHANGE UP (ref 1.6–2.6)
MAGNESIUM SERPL-MCNC: 2.6 MG/DL — SIGNIFICANT CHANGE UP (ref 1.6–2.6)
MANUAL SMEAR VERIFICATION: SIGNIFICANT CHANGE UP
MCHC RBC-ENTMCNC: 27.5 PG — SIGNIFICANT CHANGE UP (ref 27–34)
MCHC RBC-ENTMCNC: 27.5 PG — SIGNIFICANT CHANGE UP (ref 27–34)
MCHC RBC-ENTMCNC: 27.6 PG — SIGNIFICANT CHANGE UP (ref 27–34)
MCHC RBC-ENTMCNC: 27.8 PG — SIGNIFICANT CHANGE UP (ref 27–34)
MCHC RBC-ENTMCNC: 27.8 PG — SIGNIFICANT CHANGE UP (ref 27–34)
MCHC RBC-ENTMCNC: 28 PG — SIGNIFICANT CHANGE UP (ref 27–34)
MCHC RBC-ENTMCNC: 28.2 PG — SIGNIFICANT CHANGE UP (ref 27–34)
MCHC RBC-ENTMCNC: 29.4 PG — SIGNIFICANT CHANGE UP (ref 27–34)
MCHC RBC-ENTMCNC: 29.4 PG — SIGNIFICANT CHANGE UP (ref 27–34)
MCHC RBC-ENTMCNC: 29.5 PG — SIGNIFICANT CHANGE UP (ref 27–34)
MCHC RBC-ENTMCNC: 29.6 PG — SIGNIFICANT CHANGE UP (ref 27–34)
MCHC RBC-ENTMCNC: 29.7 PG — SIGNIFICANT CHANGE UP (ref 27–34)
MCHC RBC-ENTMCNC: 29.9 PG — SIGNIFICANT CHANGE UP (ref 27–34)
MCHC RBC-ENTMCNC: 30.2 PG — SIGNIFICANT CHANGE UP (ref 27–34)
MCHC RBC-ENTMCNC: 30.2 PG — SIGNIFICANT CHANGE UP (ref 27–34)
MCHC RBC-ENTMCNC: 30.4 PG — SIGNIFICANT CHANGE UP (ref 27–34)
MCHC RBC-ENTMCNC: 32.6 GM/DL — SIGNIFICANT CHANGE UP (ref 32–36)
MCHC RBC-ENTMCNC: 32.7 GM/DL — SIGNIFICANT CHANGE UP (ref 32–36)
MCHC RBC-ENTMCNC: 33.3 GM/DL — SIGNIFICANT CHANGE UP (ref 32–36)
MCHC RBC-ENTMCNC: 33.6 GM/DL — SIGNIFICANT CHANGE UP (ref 32–36)
MCHC RBC-ENTMCNC: 33.6 GM/DL — SIGNIFICANT CHANGE UP (ref 32–36)
MCHC RBC-ENTMCNC: 33.8 GM/DL — SIGNIFICANT CHANGE UP (ref 32–36)
MCHC RBC-ENTMCNC: 34.2 GM/DL — SIGNIFICANT CHANGE UP (ref 32–36)
MCHC RBC-ENTMCNC: 34.5 GM/DL — SIGNIFICANT CHANGE UP (ref 32–36)
MCHC RBC-ENTMCNC: 34.8 GM/DL — SIGNIFICANT CHANGE UP (ref 32–36)
MCHC RBC-ENTMCNC: 35.1 GM/DL — SIGNIFICANT CHANGE UP (ref 32–36)
MCHC RBC-ENTMCNC: 35.1 GM/DL — SIGNIFICANT CHANGE UP (ref 32–36)
MCHC RBC-ENTMCNC: 35.2 GM/DL — SIGNIFICANT CHANGE UP (ref 32–36)
MCHC RBC-ENTMCNC: 35.5 GM/DL — SIGNIFICANT CHANGE UP (ref 32–36)
MCHC RBC-ENTMCNC: 35.6 GM/DL — SIGNIFICANT CHANGE UP (ref 32–36)
MCHC RBC-ENTMCNC: 36 G/DL — SIGNIFICANT CHANGE UP (ref 32–36)
MCHC RBC-ENTMCNC: 36.1 GM/DL — HIGH (ref 32–36)
MCV RBC AUTO: 79.9 FL — LOW (ref 80–100)
MCV RBC AUTO: 80.7 FL — SIGNIFICANT CHANGE UP (ref 80–100)
MCV RBC AUTO: 81.4 FL — SIGNIFICANT CHANGE UP (ref 80–100)
MCV RBC AUTO: 82.8 FL — SIGNIFICANT CHANGE UP (ref 80–100)
MCV RBC AUTO: 83.2 FL — SIGNIFICANT CHANGE UP (ref 80–100)
MCV RBC AUTO: 83.8 FL — SIGNIFICANT CHANGE UP (ref 80–100)
MCV RBC AUTO: 83.9 FL — SIGNIFICANT CHANGE UP (ref 80–100)
MCV RBC AUTO: 84.2 FL — SIGNIFICANT CHANGE UP (ref 80–100)
MCV RBC AUTO: 84.4 FL — SIGNIFICANT CHANGE UP (ref 80–100)
MCV RBC AUTO: 84.5 FL — SIGNIFICANT CHANGE UP (ref 80–100)
MCV RBC AUTO: 84.5 FL — SIGNIFICANT CHANGE UP (ref 80–100)
MCV RBC AUTO: 84.8 FL — SIGNIFICANT CHANGE UP (ref 80–100)
MCV RBC AUTO: 84.9 FL — SIGNIFICANT CHANGE UP (ref 80–100)
MCV RBC AUTO: 85.1 FL — SIGNIFICANT CHANGE UP (ref 80–100)
MCV RBC AUTO: 85.8 FL — SIGNIFICANT CHANGE UP (ref 80–100)
MCV RBC AUTO: 88.5 FL — SIGNIFICANT CHANGE UP (ref 80–100)
METAMYELOCYTES # FLD: 2.8 % — HIGH (ref 0–1)
METHOD TYPE: SIGNIFICANT CHANGE UP
MICROCYTES BLD QL: SLIGHT — SIGNIFICANT CHANGE UP
MONOCYTES # BLD AUTO: 0.25 K/UL — SIGNIFICANT CHANGE UP (ref 0–0.9)
MONOCYTES # BLD AUTO: 0.66 K/UL — SIGNIFICANT CHANGE UP (ref 0–0.9)
MONOCYTES # BLD AUTO: 0.68 K/UL — SIGNIFICANT CHANGE UP (ref 0–0.9)
MONOCYTES # BLD AUTO: 0.8 K/UL — SIGNIFICANT CHANGE UP (ref 0–0.9)
MONOCYTES # BLD AUTO: 1.02 K/UL — HIGH (ref 0–0.9)
MONOCYTES # BLD AUTO: 1.12 K/UL — HIGH (ref 0–0.9)
MONOCYTES # BLD AUTO: 1.16 K/UL — HIGH (ref 0–0.9)
MONOCYTES # BLD AUTO: 1.32 K/UL — HIGH (ref 0–0.9)
MONOCYTES # BLD AUTO: 1.34 K/UL — HIGH (ref 0–0.9)
MONOCYTES # BLD AUTO: 1.45 K/UL — HIGH (ref 0–0.9)
MONOCYTES # BLD AUTO: 1.68 K/UL — HIGH (ref 0–0.9)
MONOCYTES # BLD AUTO: 1.93 K/UL — HIGH (ref 0–0.9)
MONOCYTES # BLD AUTO: 2.41 K/UL — HIGH (ref 0–0.9)
MONOCYTES # BLD AUTO: 2.48 K/UL — HIGH (ref 0–0.9)
MONOCYTES # BLD AUTO: 2.82 K/UL — HIGH (ref 0–0.9)
MONOCYTES # BLD AUTO: 3.17 K/UL — HIGH (ref 0–0.9)
MONOCYTES NFR BLD AUTO: 10.1 % — SIGNIFICANT CHANGE UP (ref 2–14)
MONOCYTES NFR BLD AUTO: 10.6 % — SIGNIFICANT CHANGE UP (ref 2–14)
MONOCYTES NFR BLD AUTO: 10.6 % — SIGNIFICANT CHANGE UP (ref 2–14)
MONOCYTES NFR BLD AUTO: 11 % — SIGNIFICANT CHANGE UP (ref 2–14)
MONOCYTES NFR BLD AUTO: 11.3 % — SIGNIFICANT CHANGE UP (ref 2–14)
MONOCYTES NFR BLD AUTO: 11.3 % — SIGNIFICANT CHANGE UP (ref 2–14)
MONOCYTES NFR BLD AUTO: 11.5 % — SIGNIFICANT CHANGE UP (ref 2–14)
MONOCYTES NFR BLD AUTO: 12.2 % — SIGNIFICANT CHANGE UP (ref 2–14)
MONOCYTES NFR BLD AUTO: 12.9 % — SIGNIFICANT CHANGE UP (ref 2–14)
MONOCYTES NFR BLD AUTO: 13.2 % — SIGNIFICANT CHANGE UP (ref 2–14)
MONOCYTES NFR BLD AUTO: 14.9 % — HIGH (ref 2–14)
MONOCYTES NFR BLD AUTO: 17.3 % — HIGH (ref 2–14)
MONOCYTES NFR BLD AUTO: 4.1 % — SIGNIFICANT CHANGE UP (ref 2–14)
MONOCYTES NFR BLD AUTO: 7 % — SIGNIFICANT CHANGE UP (ref 2–14)
MONOCYTES NFR BLD AUTO: 7.6 % — SIGNIFICANT CHANGE UP (ref 2–14)
MONOCYTES NFR BLD AUTO: 9.6 % — SIGNIFICANT CHANGE UP (ref 2–14)
MRSA PCR RESULT.: SIGNIFICANT CHANGE UP
MYELOCYTES NFR BLD: 0.4 % — HIGH (ref 0–0)
MYELOCYTES NFR BLD: 1.8 % — HIGH (ref 0–0)
MYELOCYTES NFR BLD: 1.9 % — HIGH (ref 0–0)
MYELOCYTES NFR BLD: 2.6 % — HIGH (ref 0–0)
NEUTROPHILS # BLD AUTO: 10.9 K/UL — HIGH (ref 1.8–7.4)
NEUTROPHILS # BLD AUTO: 14.37 K/UL — HIGH (ref 1.8–7.4)
NEUTROPHILS # BLD AUTO: 14.72 K/UL — HIGH (ref 1.8–7.4)
NEUTROPHILS # BLD AUTO: 16.02 K/UL — HIGH (ref 1.8–7.4)
NEUTROPHILS # BLD AUTO: 18.3 K/UL — HIGH (ref 1.8–7.4)
NEUTROPHILS # BLD AUTO: 23.91 K/UL — HIGH (ref 1.8–7.4)
NEUTROPHILS # BLD AUTO: 4.71 K/UL — SIGNIFICANT CHANGE UP (ref 1.8–7.4)
NEUTROPHILS # BLD AUTO: 4.83 K/UL — SIGNIFICANT CHANGE UP (ref 1.8–7.4)
NEUTROPHILS # BLD AUTO: 5.18 K/UL — SIGNIFICANT CHANGE UP (ref 1.8–7.4)
NEUTROPHILS # BLD AUTO: 5.5 K/UL — SIGNIFICANT CHANGE UP (ref 1.8–7.4)
NEUTROPHILS # BLD AUTO: 6.54 K/UL — SIGNIFICANT CHANGE UP (ref 1.8–7.4)
NEUTROPHILS # BLD AUTO: 8.31 K/UL — HIGH (ref 1.8–7.4)
NEUTROPHILS # BLD AUTO: 8.38 K/UL — HIGH (ref 1.8–7.4)
NEUTROPHILS # BLD AUTO: 8.49 K/UL — HIGH (ref 1.8–7.4)
NEUTROPHILS # BLD AUTO: 9.02 K/UL — HIGH (ref 1.8–7.4)
NEUTROPHILS # BLD AUTO: 9.8 K/UL — HIGH (ref 1.8–7.4)
NEUTROPHILS NFR BLD AUTO: 66.9 % — SIGNIFICANT CHANGE UP (ref 43–77)
NEUTROPHILS NFR BLD AUTO: 67.5 % — SIGNIFICANT CHANGE UP (ref 43–77)
NEUTROPHILS NFR BLD AUTO: 68.6 % — SIGNIFICANT CHANGE UP (ref 43–77)
NEUTROPHILS NFR BLD AUTO: 71.9 % — SIGNIFICANT CHANGE UP (ref 43–77)
NEUTROPHILS NFR BLD AUTO: 72.9 % — SIGNIFICANT CHANGE UP (ref 43–77)
NEUTROPHILS NFR BLD AUTO: 73.2 % — SIGNIFICANT CHANGE UP (ref 43–77)
NEUTROPHILS NFR BLD AUTO: 75.7 % — SIGNIFICANT CHANGE UP (ref 43–77)
NEUTROPHILS NFR BLD AUTO: 76 % — SIGNIFICANT CHANGE UP (ref 43–77)
NEUTROPHILS NFR BLD AUTO: 77.2 % — HIGH (ref 43–77)
NEUTROPHILS NFR BLD AUTO: 77.6 % — HIGH (ref 43–77)
NEUTROPHILS NFR BLD AUTO: 77.8 % — HIGH (ref 43–77)
NEUTROPHILS NFR BLD AUTO: 78 % — HIGH (ref 43–77)
NEUTROPHILS NFR BLD AUTO: 80.6 % — HIGH (ref 43–77)
NEUTROPHILS NFR BLD AUTO: 82.3 % — HIGH (ref 43–77)
NEUTROPHILS NFR BLD AUTO: 84.6 % — HIGH (ref 43–77)
NEUTROPHILS NFR BLD AUTO: 84.7 % — HIGH (ref 43–77)
NEUTS BAND # BLD: 1.7 % — SIGNIFICANT CHANGE UP (ref 0–6)
NEUTS BAND # BLD: 3.5 % — SIGNIFICANT CHANGE UP (ref 0–6)
NEUTS BAND # BLD: 3.5 % — SIGNIFICANT CHANGE UP (ref 0–6)
NEUTS BAND # BLD: 4.8 % — SIGNIFICANT CHANGE UP (ref 0–6)
NITRITE UR-MCNC: NEGATIVE — SIGNIFICANT CHANGE UP
NITRITE UR-MCNC: NEGATIVE — SIGNIFICANT CHANGE UP
NITRITE UR-MCNC: POSITIVE
NRBC # BLD: 0 /100 WBCS — SIGNIFICANT CHANGE UP (ref 0–0)
NRBC # BLD: 1 /100 WBCS — HIGH (ref 0–0)
NRBC # BLD: 13 /100 — HIGH (ref 0–0)
NRBC # BLD: 19 /100 — HIGH (ref 0–0)
NRBC # BLD: 2 /100 WBCS — HIGH (ref 0–0)
NRBC # BLD: 2 /100 — HIGH (ref 0–0)
NRBC # BLD: 3 /100 — HIGH (ref 0–0)
NRBC # BLD: 32 /100 WBCS — HIGH (ref 0–0)
NRBC # BLD: 40 /100 WBCS — HIGH (ref 0–0)
NRBC # BLD: 57 /100 — HIGH (ref 0–0)
NRBC # BLD: 6 /100 WBCS — HIGH (ref 0–0)
NRBC # FLD: 0 K/UL — SIGNIFICANT CHANGE UP (ref 0–0)
NRBC # FLD: 0 K/UL — SIGNIFICANT CHANGE UP (ref 0–0)
NRBC # FLD: 0.02 K/UL — HIGH (ref 0–0)
NRBC # FLD: 0.04 K/UL — HIGH (ref 0–0)
NRBC # FLD: 0.06 K/UL — HIGH (ref 0–0)
NRBC # FLD: 0.13 K/UL — HIGH (ref 0–0)
NRBC # FLD: 0.18 K/UL — HIGH (ref 0–0)
NRBC # FLD: 1.02 K/UL — HIGH (ref 0–0)
NRBC # FLD: 6.54 K/UL — HIGH (ref 0–0)
NRBC # FLD: 8.61 K/UL — HIGH (ref 0–0)
ORGANISM # SPEC MICROSCOPIC CNT: SIGNIFICANT CHANGE UP
ORGANISM # SPEC MICROSCOPIC CNT: SIGNIFICANT CHANGE UP
OSMOLALITY UR: 354 MOSM/KG — SIGNIFICANT CHANGE UP (ref 50–1200)
OVALOCYTES BLD QL SMEAR: SLIGHT — SIGNIFICANT CHANGE UP
PCO2 BLDV: 30 MMHG — LOW (ref 39–42)
PCO2 BLDV: 36 MMHG — LOW (ref 39–42)
PH BLDV: 7.27 — LOW (ref 7.32–7.43)
PH BLDV: 7.36 — SIGNIFICANT CHANGE UP (ref 7.32–7.43)
PH UR: 6 — SIGNIFICANT CHANGE UP (ref 5–8)
PHOSPHATE SERPL-MCNC: 4.6 MG/DL — HIGH (ref 2.5–4.5)
PHOSPHATE SERPL-MCNC: 4.7 MG/DL — HIGH (ref 2.5–4.5)
PHOSPHATE SERPL-MCNC: 5 MG/DL — HIGH (ref 2.5–4.5)
PHOSPHATE SERPL-MCNC: 5.2 MG/DL — HIGH (ref 2.5–4.5)
PHOSPHATE SERPL-MCNC: 6.1 MG/DL — HIGH (ref 2.5–4.5)
PHOSPHATE SERPL-MCNC: 6.5 MG/DL — HIGH (ref 2.5–4.5)
PHOSPHATE SERPL-MCNC: 6.5 MG/DL — HIGH (ref 2.5–4.5)
PHOSPHATE SERPL-MCNC: 6.6 MG/DL — HIGH (ref 2.5–4.5)
PHOSPHATE SERPL-MCNC: 6.8 MG/DL — HIGH (ref 2.5–4.5)
PHOSPHATE SERPL-MCNC: 6.9 MG/DL — HIGH (ref 2.5–4.5)
PHOSPHATE SERPL-MCNC: 7.1 MG/DL — HIGH (ref 2.5–4.5)
PHOSPHATE SERPL-MCNC: 7.1 MG/DL — HIGH (ref 2.5–4.5)
PHOSPHATE SERPL-MCNC: 8.4 MG/DL — HIGH (ref 2.5–4.5)
PLAT MORPH BLD: ABNORMAL
PLAT MORPH BLD: NORMAL — SIGNIFICANT CHANGE UP
PLATELET # BLD AUTO: 124 K/UL — LOW (ref 150–400)
PLATELET # BLD AUTO: 125 K/UL — LOW (ref 150–400)
PLATELET # BLD AUTO: 152 K/UL — SIGNIFICANT CHANGE UP (ref 150–400)
PLATELET # BLD AUTO: 166 K/UL — SIGNIFICANT CHANGE UP (ref 150–400)
PLATELET # BLD AUTO: 181 K/UL — SIGNIFICANT CHANGE UP (ref 150–400)
PLATELET # BLD AUTO: 191 K/UL — SIGNIFICANT CHANGE UP (ref 150–400)
PLATELET # BLD AUTO: 196 K/UL — SIGNIFICANT CHANGE UP (ref 150–400)
PLATELET # BLD AUTO: 200 K/UL — SIGNIFICANT CHANGE UP (ref 150–400)
PLATELET # BLD AUTO: 201 K/UL — SIGNIFICANT CHANGE UP (ref 150–400)
PLATELET # BLD AUTO: 217 K/UL — SIGNIFICANT CHANGE UP (ref 150–400)
PLATELET # BLD AUTO: 237 K/UL — SIGNIFICANT CHANGE UP (ref 150–400)
PLATELET # BLD AUTO: 249 K/UL — SIGNIFICANT CHANGE UP (ref 150–400)
PLATELET # BLD AUTO: 57 K/UL — LOW (ref 150–400)
PLATELET # BLD AUTO: 64 K/UL — LOW (ref 150–400)
PLATELET # BLD AUTO: 71 K/UL — LOW (ref 150–400)
PLATELET # BLD AUTO: 81 K/UL — LOW (ref 150–400)
PLATELET COUNT - ESTIMATE: ABNORMAL
PLATELET COUNT - ESTIMATE: NORMAL — SIGNIFICANT CHANGE UP
PLATELET COUNT - ESTIMATE: NORMAL — SIGNIFICANT CHANGE UP
PO2 BLDV: 56 MMHG — SIGNIFICANT CHANGE UP
PO2 BLDV: 62 MMHG — SIGNIFICANT CHANGE UP
POIKILOCYTOSIS BLD QL AUTO: SIGNIFICANT CHANGE UP
POIKILOCYTOSIS BLD QL AUTO: SIGNIFICANT CHANGE UP
POIKILOCYTOSIS BLD QL AUTO: SLIGHT — SIGNIFICANT CHANGE UP
POLYCHROMASIA BLD QL SMEAR: SLIGHT — SIGNIFICANT CHANGE UP
POTASSIUM BLDV-SCNC: 6.3 MMOL/L — CRITICAL HIGH (ref 3.5–5.1)
POTASSIUM SERPL-MCNC: 3.9 MMOL/L — SIGNIFICANT CHANGE UP (ref 3.5–5.3)
POTASSIUM SERPL-MCNC: 4.4 MMOL/L — SIGNIFICANT CHANGE UP (ref 3.5–5.3)
POTASSIUM SERPL-MCNC: 4.6 MMOL/L — SIGNIFICANT CHANGE UP (ref 3.5–5.3)
POTASSIUM SERPL-MCNC: 4.7 MMOL/L — SIGNIFICANT CHANGE UP (ref 3.5–5.3)
POTASSIUM SERPL-MCNC: 4.7 MMOL/L — SIGNIFICANT CHANGE UP (ref 3.5–5.3)
POTASSIUM SERPL-MCNC: 4.8 MMOL/L — SIGNIFICANT CHANGE UP (ref 3.5–5.3)
POTASSIUM SERPL-MCNC: 5.4 MMOL/L — HIGH (ref 3.5–5.3)
POTASSIUM SERPL-MCNC: 5.4 MMOL/L — HIGH (ref 3.5–5.3)
POTASSIUM SERPL-MCNC: 5.8 MMOL/L — HIGH (ref 3.5–5.3)
POTASSIUM SERPL-MCNC: 5.9 MMOL/L — HIGH (ref 3.5–5.3)
POTASSIUM SERPL-MCNC: 6.1 MMOL/L — HIGH (ref 3.5–5.3)
POTASSIUM SERPL-MCNC: 6.4 MMOL/L — CRITICAL HIGH (ref 3.5–5.3)
POTASSIUM SERPL-SCNC: 3.9 MMOL/L — SIGNIFICANT CHANGE UP (ref 3.5–5.3)
POTASSIUM SERPL-SCNC: 4.4 MMOL/L — SIGNIFICANT CHANGE UP (ref 3.5–5.3)
POTASSIUM SERPL-SCNC: 4.6 MMOL/L — SIGNIFICANT CHANGE UP (ref 3.5–5.3)
POTASSIUM SERPL-SCNC: 4.7 MMOL/L — SIGNIFICANT CHANGE UP (ref 3.5–5.3)
POTASSIUM SERPL-SCNC: 4.7 MMOL/L — SIGNIFICANT CHANGE UP (ref 3.5–5.3)
POTASSIUM SERPL-SCNC: 4.8 MMOL/L — SIGNIFICANT CHANGE UP (ref 3.5–5.3)
POTASSIUM SERPL-SCNC: 5.4 MMOL/L — HIGH (ref 3.5–5.3)
POTASSIUM SERPL-SCNC: 5.4 MMOL/L — HIGH (ref 3.5–5.3)
POTASSIUM SERPL-SCNC: 5.8 MMOL/L — HIGH (ref 3.5–5.3)
POTASSIUM SERPL-SCNC: 5.9 MMOL/L — HIGH (ref 3.5–5.3)
POTASSIUM SERPL-SCNC: 6.1 MMOL/L — HIGH (ref 3.5–5.3)
POTASSIUM SERPL-SCNC: 6.4 MMOL/L — CRITICAL HIGH (ref 3.5–5.3)
POTASSIUM UR-SCNC: 40.7 MMOL/L — SIGNIFICANT CHANGE UP
PROT ?TM UR-MCNC: 116 MG/DL — SIGNIFICANT CHANGE UP
PROT SERPL-MCNC: 4.7 G/DL — LOW (ref 6–8.3)
PROT SERPL-MCNC: 4.7 G/DL — LOW (ref 6–8.3)
PROT SERPL-MCNC: 4.8 G/DL — LOW (ref 6–8.3)
PROT SERPL-MCNC: 5.2 G/DL — LOW (ref 6–8.3)
PROT SERPL-MCNC: 5.2 G/DL — LOW (ref 6–8.3)
PROT SERPL-MCNC: 5.3 G/DL — LOW (ref 6–8.3)
PROT SERPL-MCNC: 5.3 G/DL — LOW (ref 6–8.3)
PROT SERPL-MCNC: 5.4 G/DL — LOW (ref 6–8.3)
PROT SERPL-MCNC: 5.4 G/DL — LOW (ref 6–8.3)
PROT SERPL-MCNC: 5.5 G/DL — LOW (ref 6–8.3)
PROT SERPL-MCNC: 5.6 G/DL — LOW (ref 6–8.3)
PROT SERPL-MCNC: 5.8 G/DL — LOW (ref 6–8.3)
PROT SERPL-MCNC: 5.9 G/DL — LOW (ref 6–8.3)
PROT SERPL-MCNC: 5.9 G/DL — LOW (ref 6–8.3)
PROT SERPL-MCNC: 6.4 GM/DL — SIGNIFICANT CHANGE UP (ref 6–8.3)
PROT SERPL-MCNC: 7.1 GM/DL — SIGNIFICANT CHANGE UP (ref 6–8.3)
PROT UR-MCNC: 100 MG/DL
PROT UR-MCNC: ABNORMAL
PROT UR-MCNC: ABNORMAL
PROT/CREAT UR-RTO: 1.1 RATIO — HIGH (ref 0–0.2)
PROTHROM AB SERPL-ACNC: 17.7 SEC — HIGH (ref 10.5–13.4)
PROTHROM AB SERPL-ACNC: 18.4 SEC — HIGH (ref 10.5–13.4)
PROTHROM AB SERPL-ACNC: 19.1 SEC — HIGH (ref 10.5–13.4)
PROTHROM AB SERPL-ACNC: 19.5 SEC — HIGH (ref 10.5–13.4)
PROTHROM AB SERPL-ACNC: 20.1 SEC — HIGH (ref 10.5–13.4)
PROTHROM AB SERPL-ACNC: 24.9 SEC — HIGH (ref 10.5–13.4)
PROTHROM AB SERPL-ACNC: 27.7 SEC — HIGH (ref 10.5–13.4)
PROTHROM AB SERPL-ACNC: 32.9 SEC — HIGH (ref 10.5–13.4)
PROTHROM AB SERPL-ACNC: 71 SEC — HIGH (ref 10.5–13.4)
RBC # BLD: 2.58 M/UL — LOW (ref 3.8–5.2)
RBC # BLD: 2.88 M/UL — LOW (ref 3.8–5.2)
RBC # BLD: 2.91 M/UL — LOW (ref 3.8–5.2)
RBC # BLD: 3.04 M/UL — LOW (ref 3.8–5.2)
RBC # BLD: 3.09 M/UL — LOW (ref 3.8–5.2)
RBC # BLD: 3.14 M/UL — LOW (ref 3.8–5.2)
RBC # BLD: 3.17 M/UL — LOW (ref 3.8–5.2)
RBC # BLD: 3.22 M/UL — LOW (ref 3.8–5.2)
RBC # BLD: 3.27 M/UL — LOW (ref 3.8–5.2)
RBC # BLD: 3.31 M/UL — LOW (ref 3.8–5.2)
RBC # BLD: 3.43 M/UL — LOW (ref 3.8–5.2)
RBC # BLD: 3.44 M/UL — LOW (ref 3.8–5.2)
RBC # BLD: 3.52 M/UL — LOW (ref 3.8–5.2)
RBC # BLD: 3.54 M/UL — LOW (ref 3.8–5.2)
RBC # BLD: 3.63 M/UL — LOW (ref 3.8–5.2)
RBC # BLD: 3.88 M/UL — SIGNIFICANT CHANGE UP (ref 3.8–5.2)
RBC # FLD: 22.7 % — HIGH (ref 10.3–14.5)
RBC # FLD: 22.7 % — HIGH (ref 10.3–14.5)
RBC # FLD: 23.2 % — HIGH (ref 10.3–14.5)
RBC # FLD: 23.7 % — HIGH (ref 10.3–14.5)
RBC # FLD: 23.9 % — HIGH (ref 10.3–14.5)
RBC # FLD: 23.9 % — HIGH (ref 10.3–14.5)
RBC # FLD: 24.1 % — HIGH (ref 10.3–14.5)
RBC # FLD: 24.2 % — HIGH (ref 10.3–14.5)
RBC # FLD: 25.8 % — HIGH (ref 10.3–14.5)
RBC # FLD: 28.2 % — HIGH (ref 10.3–14.5)
RBC # FLD: 29.3 % — HIGH (ref 10.3–14.5)
RBC # FLD: 30.1 % — HIGH (ref 10.3–14.5)
RBC # FLD: 30.6 % — HIGH (ref 10.3–14.5)
RBC # FLD: 30.9 % — HIGH (ref 10.3–14.5)
RBC # FLD: 31.7 % — HIGH (ref 10.3–14.5)
RBC # FLD: 31.8 % — HIGH (ref 10.3–14.5)
RBC BLD AUTO: ABNORMAL
RBC BLD AUTO: NORMAL — SIGNIFICANT CHANGE UP
RBC CASTS # UR COMP ASSIST: 10 /HPF — HIGH (ref 0–4)
RBC CASTS # UR COMP ASSIST: 10 /HPF — SIGNIFICANT CHANGE UP (ref 0–4)
RBC CASTS # UR COMP ASSIST: ABNORMAL /HPF (ref 0–4)
RH IG SCN BLD-IMP: POSITIVE — SIGNIFICANT CHANGE UP
S AUREUS DNA NOSE QL NAA+PROBE: SIGNIFICANT CHANGE UP
SAO2 % BLDV: 85.6 % — SIGNIFICANT CHANGE UP
SAO2 % BLDV: 88.9 % — SIGNIFICANT CHANGE UP
SARS-COV-2 RNA SPEC QL NAA+PROBE: SIGNIFICANT CHANGE UP
SCHISTOCYTES BLD QL AUTO: SLIGHT — SIGNIFICANT CHANGE UP
SODIUM SERPL-SCNC: 131 MMOL/L — LOW (ref 135–145)
SODIUM SERPL-SCNC: 132 MMOL/L — LOW (ref 135–145)
SODIUM SERPL-SCNC: 133 MMOL/L — LOW (ref 135–145)
SODIUM SERPL-SCNC: 133 MMOL/L — LOW (ref 135–145)
SODIUM SERPL-SCNC: 134 MMOL/L — LOW (ref 135–145)
SODIUM SERPL-SCNC: 135 MMOL/L — SIGNIFICANT CHANGE UP (ref 135–145)
SODIUM SERPL-SCNC: 136 MMOL/L — SIGNIFICANT CHANGE UP (ref 135–145)
SODIUM SERPL-SCNC: 137 MMOL/L — SIGNIFICANT CHANGE UP (ref 135–145)
SODIUM UR-SCNC: 40 MMOL/L — SIGNIFICANT CHANGE UP
SODIUM UR-SCNC: 42 MMOL/L — SIGNIFICANT CHANGE UP
SP GR SPEC: 1.01 — SIGNIFICANT CHANGE UP (ref 1.01–1.02)
SP GR SPEC: 1.01 — SIGNIFICANT CHANGE UP (ref 1.01–1.05)
SP GR SPEC: 1.02 — SIGNIFICANT CHANGE UP (ref 1.01–1.05)
SPECIMEN SOURCE: SIGNIFICANT CHANGE UP
T3 SERPL-MCNC: 36 NG/DL — LOW (ref 80–200)
T4 FREE SERPL-MCNC: 0.9 NG/DL — SIGNIFICANT CHANGE UP (ref 0.9–1.8)
TARGETS BLD QL SMEAR: SIGNIFICANT CHANGE UP
TARGETS BLD QL SMEAR: SIGNIFICANT CHANGE UP
TARGETS BLD QL SMEAR: SLIGHT — SIGNIFICANT CHANGE UP
TROPONIN T, HIGH SENSITIVITY RESULT: 129 NG/L — CRITICAL HIGH
TROPONIN T, HIGH SENSITIVITY RESULT: 136 NG/L — CRITICAL HIGH
TROPONIN T, HIGH SENSITIVITY RESULT: 139 NG/L — CRITICAL HIGH
TROPONIN T, HIGH SENSITIVITY RESULT: 141 NG/L — CRITICAL HIGH
TROPONIN T, HIGH SENSITIVITY RESULT: 187 NG/L — CRITICAL HIGH
TROPONIN T, HIGH SENSITIVITY RESULT: 29 NG/L — SIGNIFICANT CHANGE UP
TROPONIN T, HIGH SENSITIVITY RESULT: 67 NG/L — CRITICAL HIGH
TROPONIN T, HIGH SENSITIVITY RESULT: 83 NG/L — CRITICAL HIGH
TSH SERPL-MCNC: 0.28 UIU/ML — SIGNIFICANT CHANGE UP (ref 0.27–4.2)
UROBILINOGEN FLD QL: 4 MG/DL
UROBILINOGEN FLD QL: SIGNIFICANT CHANGE UP
UROBILINOGEN FLD QL: SIGNIFICANT CHANGE UP
UUN UR-MCNC: 394 MG/DL — SIGNIFICANT CHANGE UP
UUN UR-MCNC: 460 MG/DL — SIGNIFICANT CHANGE UP
VANCOMYCIN FLD-MCNC: 14.4 UG/ML — SIGNIFICANT CHANGE UP
VANCOMYCIN FLD-MCNC: 9.1 UG/ML — SIGNIFICANT CHANGE UP
VANCOMYCIN FLD-MCNC: 9.7 UG/ML — SIGNIFICANT CHANGE UP
VANCOMYCIN FLD-MCNC: <4 UG/ML — SIGNIFICANT CHANGE UP
VARIANT LYMPHS # BLD: 0.9 % — SIGNIFICANT CHANGE UP (ref 0–6)
VARIANT LYMPHS # BLD: 1.7 % — SIGNIFICANT CHANGE UP (ref 0–6)
VARIANT LYMPHS # BLD: 3.5 % — SIGNIFICANT CHANGE UP (ref 0–6)
WBC # BLD: 10.1 K/UL — SIGNIFICANT CHANGE UP (ref 3.8–10.5)
WBC # BLD: 10.52 K/UL — HIGH (ref 3.8–10.5)
WBC # BLD: 10.83 K/UL — HIGH (ref 3.8–10.5)
WBC # BLD: 11.63 K/UL — HIGH (ref 3.8–10.5)
WBC # BLD: 12.69 K/UL — HIGH (ref 3.8–10.5)
WBC # BLD: 16.3 K/UL — HIGH (ref 3.8–10.5)
WBC # BLD: 20.12 K/UL — HIGH (ref 3.8–10.5)
WBC # BLD: 20.75 K/UL — HIGH (ref 3.8–10.5)
WBC # BLD: 21.25 K/UL — HIGH (ref 3.8–10.5)
WBC # BLD: 21.29 K/UL — HIGH (ref 3.8–10.5)
WBC # BLD: 32.57 K/UL — HIGH (ref 3.8–10.5)
WBC # BLD: 6.12 K/UL — SIGNIFICANT CHANGE UP (ref 3.8–10.5)
WBC # BLD: 6.2 K/UL — SIGNIFICANT CHANGE UP (ref 3.8–10.5)
WBC # BLD: 6.21 K/UL — SIGNIFICANT CHANGE UP (ref 3.8–10.5)
WBC # BLD: 7.05 K/UL — SIGNIFICANT CHANGE UP (ref 3.8–10.5)
WBC # BLD: 8.97 K/UL — SIGNIFICANT CHANGE UP (ref 3.8–10.5)
WBC # FLD AUTO: 10.1 K/UL — SIGNIFICANT CHANGE UP (ref 3.8–10.5)
WBC # FLD AUTO: 10.52 K/UL — HIGH (ref 3.8–10.5)
WBC # FLD AUTO: 10.83 K/UL — HIGH (ref 3.8–10.5)
WBC # FLD AUTO: 11.63 K/UL — HIGH (ref 3.8–10.5)
WBC # FLD AUTO: 12.69 K/UL — HIGH (ref 3.8–10.5)
WBC # FLD AUTO: 16.3 K/UL — HIGH (ref 3.8–10.5)
WBC # FLD AUTO: 20.12 K/UL — HIGH (ref 3.8–10.5)
WBC # FLD AUTO: 20.75 K/UL — HIGH (ref 3.8–10.5)
WBC # FLD AUTO: 21.25 K/UL — HIGH (ref 3.8–10.5)
WBC # FLD AUTO: 21.29 K/UL — HIGH (ref 3.8–10.5)
WBC # FLD AUTO: 32.57 K/UL — HIGH (ref 3.8–10.5)
WBC # FLD AUTO: 6.12 K/UL — SIGNIFICANT CHANGE UP (ref 3.8–10.5)
WBC # FLD AUTO: 6.2 K/UL — SIGNIFICANT CHANGE UP (ref 3.8–10.5)
WBC # FLD AUTO: 6.21 K/UL — SIGNIFICANT CHANGE UP (ref 3.8–10.5)
WBC # FLD AUTO: 7.05 K/UL — SIGNIFICANT CHANGE UP (ref 3.8–10.5)
WBC # FLD AUTO: 8.97 K/UL — SIGNIFICANT CHANGE UP (ref 3.8–10.5)
WBC UR QL: 10 /HPF — HIGH (ref 0–5)
WBC UR QL: 2 /HPF — SIGNIFICANT CHANGE UP (ref 0–5)
WBC UR QL: ABNORMAL

## 2022-01-01 PROCEDURE — 71045 X-RAY EXAM CHEST 1 VIEW: CPT | Mod: 26

## 2022-01-01 PROCEDURE — 99232 SBSQ HOSP IP/OBS MODERATE 35: CPT | Mod: GC

## 2022-01-01 PROCEDURE — 99291 CRITICAL CARE FIRST HOUR: CPT | Mod: GC

## 2022-01-01 PROCEDURE — 93010 ELECTROCARDIOGRAM REPORT: CPT

## 2022-01-01 PROCEDURE — 93306 TTE W/DOPPLER COMPLETE: CPT | Mod: 26

## 2022-01-01 PROCEDURE — 99223 1ST HOSP IP/OBS HIGH 75: CPT

## 2022-01-01 PROCEDURE — 99255 IP/OBS CONSLTJ NEW/EST HI 80: CPT | Mod: GC

## 2022-01-01 PROCEDURE — 99291 CRITICAL CARE FIRST HOUR: CPT

## 2022-01-01 PROCEDURE — 99285 EMERGENCY DEPT VISIT HI MDM: CPT

## 2022-01-01 PROCEDURE — 74018 RADEX ABDOMEN 1 VIEW: CPT | Mod: 26

## 2022-01-01 PROCEDURE — 99233 SBSQ HOSP IP/OBS HIGH 50: CPT

## 2022-01-01 PROCEDURE — 76705 ECHO EXAM OF ABDOMEN: CPT | Mod: 26

## 2022-01-01 PROCEDURE — 71045 X-RAY EXAM CHEST 1 VIEW: CPT | Mod: 26,77

## 2022-01-01 PROCEDURE — 99232 SBSQ HOSP IP/OBS MODERATE 35: CPT

## 2022-01-01 PROCEDURE — 99497 ADVNCD CARE PLAN 30 MIN: CPT | Mod: 25

## 2022-01-01 PROCEDURE — 93971 EXTREMITY STUDY: CPT | Mod: 26

## 2022-01-01 PROCEDURE — 99233 SBSQ HOSP IP/OBS HIGH 50: CPT | Mod: GC

## 2022-01-01 PROCEDURE — 36556 INSERT NON-TUNNEL CV CATH: CPT

## 2022-01-01 PROCEDURE — 99222 1ST HOSP IP/OBS MODERATE 55: CPT | Mod: GC

## 2022-01-01 PROCEDURE — 99254 IP/OBS CNSLTJ NEW/EST MOD 60: CPT | Mod: GC

## 2022-01-01 PROCEDURE — 74176 CT ABD & PELVIS W/O CONTRAST: CPT | Mod: 26,MA

## 2022-01-01 PROCEDURE — 76770 US EXAM ABDO BACK WALL COMP: CPT | Mod: 26

## 2022-01-01 RX ORDER — CALCIUM GLUCONATE 100 MG/ML
1 VIAL (ML) INTRAVENOUS ONCE
Refills: 0 | Status: COMPLETED | OUTPATIENT
Start: 2022-01-01 | End: 2022-01-01

## 2022-01-01 RX ORDER — SODIUM CHLORIDE 9 MG/ML
1000 INJECTION, SOLUTION INTRAVENOUS
Refills: 0 | Status: DISCONTINUED | OUTPATIENT
Start: 2022-01-01 | End: 2022-01-01

## 2022-01-01 RX ORDER — MIDODRINE HYDROCHLORIDE 2.5 MG/1
20 TABLET ORAL EVERY 8 HOURS
Refills: 0 | Status: DISCONTINUED | OUTPATIENT
Start: 2022-01-01 | End: 2022-01-01

## 2022-01-01 RX ORDER — VANCOMYCIN HCL 1 G
1000 VIAL (EA) INTRAVENOUS ONCE
Refills: 0 | Status: DISCONTINUED | OUTPATIENT
Start: 2022-01-01 | End: 2022-01-01

## 2022-01-01 RX ORDER — SODIUM ZIRCONIUM CYCLOSILICATE 10 G/10G
10 POWDER, FOR SUSPENSION ORAL ONCE
Refills: 0 | Status: COMPLETED | OUTPATIENT
Start: 2022-01-01 | End: 2022-01-01

## 2022-01-01 RX ORDER — DEXTROSE 50 % IN WATER 50 %
15 SYRINGE (ML) INTRAVENOUS ONCE
Refills: 0 | Status: DISCONTINUED | OUTPATIENT
Start: 2022-01-01 | End: 2022-01-01

## 2022-01-01 RX ORDER — MIDODRINE HYDROCHLORIDE 2.5 MG/1
10 TABLET ORAL ONCE
Refills: 0 | Status: COMPLETED | OUTPATIENT
Start: 2022-01-01 | End: 2022-01-01

## 2022-01-01 RX ORDER — CEFEPIME 1 G/1
1000 INJECTION, POWDER, FOR SOLUTION INTRAMUSCULAR; INTRAVENOUS ONCE
Refills: 0 | Status: DISCONTINUED | OUTPATIENT
Start: 2022-01-01 | End: 2022-01-01

## 2022-01-01 RX ORDER — VANCOMYCIN HCL 1 G
1250 VIAL (EA) INTRAVENOUS ONCE
Refills: 0 | Status: COMPLETED | OUTPATIENT
Start: 2022-01-01 | End: 2022-01-01

## 2022-01-01 RX ORDER — GLUCAGON INJECTION, SOLUTION 0.5 MG/.1ML
1 INJECTION, SOLUTION SUBCUTANEOUS ONCE
Refills: 0 | Status: DISCONTINUED | OUTPATIENT
Start: 2022-01-01 | End: 2022-01-01

## 2022-01-01 RX ORDER — NOREPINEPHRINE BITARTRATE/D5W 8 MG/250ML
0.05 PLASTIC BAG, INJECTION (ML) INTRAVENOUS
Qty: 8 | Refills: 0 | Status: DISCONTINUED | OUTPATIENT
Start: 2022-01-01 | End: 2022-01-01

## 2022-01-01 RX ORDER — DEXTROSE 50 % IN WATER 50 %
12.5 SYRINGE (ML) INTRAVENOUS ONCE
Refills: 0 | Status: DISCONTINUED | OUTPATIENT
Start: 2022-01-01 | End: 2022-01-01

## 2022-01-01 RX ORDER — NOREPINEPHRINE BITARTRATE/D5W 8 MG/250ML
0.05 PLASTIC BAG, INJECTION (ML) INTRAVENOUS
Qty: 16 | Refills: 0 | Status: DISCONTINUED | OUTPATIENT
Start: 2022-01-01 | End: 2022-01-01

## 2022-01-01 RX ORDER — HYDROCORTISONE 20 MG
50 TABLET ORAL EVERY 6 HOURS
Refills: 0 | Status: DISCONTINUED | OUTPATIENT
Start: 2022-01-01 | End: 2022-01-01

## 2022-01-01 RX ORDER — DESMOPRESSIN ACETATE 0.1 MG/1
30 TABLET ORAL ONCE
Refills: 0 | Status: COMPLETED | OUTPATIENT
Start: 2022-01-01 | End: 2022-01-01

## 2022-01-01 RX ORDER — METRONIDAZOLE 500 MG
TABLET ORAL
Refills: 0 | Status: DISCONTINUED | OUTPATIENT
Start: 2022-01-01 | End: 2022-01-01

## 2022-01-01 RX ORDER — CEFTRIAXONE 500 MG/1
1000 INJECTION, POWDER, FOR SOLUTION INTRAMUSCULAR; INTRAVENOUS ONCE
Refills: 0 | Status: DISCONTINUED | OUTPATIENT
Start: 2022-01-01 | End: 2022-01-01

## 2022-01-01 RX ORDER — INSULIN HUMAN 100 [IU]/ML
5 INJECTION, SOLUTION SUBCUTANEOUS ONCE
Refills: 0 | Status: COMPLETED | OUTPATIENT
Start: 2022-01-01 | End: 2022-01-01

## 2022-01-01 RX ORDER — MORPHINE SULFATE 50 MG/1
2 CAPSULE, EXTENDED RELEASE ORAL
Qty: 100 | Refills: 0 | Status: DISCONTINUED | OUTPATIENT
Start: 2022-01-01 | End: 2022-01-01

## 2022-01-01 RX ORDER — DESMOPRESSIN ACETATE 0.1 MG/1
25 TABLET ORAL ONCE
Refills: 0 | Status: DISCONTINUED | OUTPATIENT
Start: 2022-01-01 | End: 2022-01-01

## 2022-01-01 RX ORDER — HYDROMORPHONE HYDROCHLORIDE 2 MG/ML
0.5 INJECTION INTRAMUSCULAR; INTRAVENOUS; SUBCUTANEOUS ONCE
Refills: 0 | Status: DISCONTINUED | OUTPATIENT
Start: 2022-01-01 | End: 2022-01-01

## 2022-01-01 RX ORDER — PHYTONADIONE (VIT K1) 5 MG
10 TABLET ORAL ONCE
Refills: 0 | Status: COMPLETED | OUTPATIENT
Start: 2022-01-01 | End: 2022-01-01

## 2022-01-01 RX ORDER — VANCOMYCIN HCL 1 G
1000 VIAL (EA) INTRAVENOUS ONCE
Refills: 0 | Status: COMPLETED | OUTPATIENT
Start: 2022-01-01 | End: 2022-01-01

## 2022-01-01 RX ORDER — PHYTONADIONE (VIT K1) 5 MG
5 TABLET ORAL ONCE
Refills: 0 | Status: DISCONTINUED | OUTPATIENT
Start: 2022-01-01 | End: 2022-01-01

## 2022-01-01 RX ORDER — METRONIDAZOLE 500 MG
500 TABLET ORAL ONCE
Refills: 0 | Status: DISCONTINUED | OUTPATIENT
Start: 2022-01-01 | End: 2022-01-01

## 2022-01-01 RX ORDER — SODIUM BICARBONATE 1 MEQ/ML
50 SYRINGE (ML) INTRAVENOUS ONCE
Refills: 0 | Status: COMPLETED | OUTPATIENT
Start: 2022-01-01 | End: 2022-01-01

## 2022-01-01 RX ORDER — PIPERACILLIN AND TAZOBACTAM 4; .5 G/20ML; G/20ML
3.38 INJECTION, POWDER, LYOPHILIZED, FOR SOLUTION INTRAVENOUS ONCE
Refills: 0 | Status: COMPLETED | OUTPATIENT
Start: 2022-01-01 | End: 2022-01-01

## 2022-01-01 RX ORDER — DEXTROSE 50 % IN WATER 50 %
25 SYRINGE (ML) INTRAVENOUS ONCE
Refills: 0 | Status: COMPLETED | OUTPATIENT
Start: 2022-01-01 | End: 2022-01-01

## 2022-01-01 RX ORDER — HYDROCORTISONE 20 MG
50 TABLET ORAL EVERY 8 HOURS
Refills: 0 | Status: DISCONTINUED | OUTPATIENT
Start: 2022-01-01 | End: 2022-01-01

## 2022-01-01 RX ORDER — MIDODRINE HYDROCHLORIDE 2.5 MG/1
30 TABLET ORAL EVERY 8 HOURS
Refills: 0 | Status: DISCONTINUED | OUTPATIENT
Start: 2022-01-01 | End: 2022-01-01

## 2022-01-01 RX ORDER — PHYTONADIONE (VIT K1) 5 MG
5 TABLET ORAL ONCE
Refills: 0 | Status: COMPLETED | OUTPATIENT
Start: 2022-01-01 | End: 2022-01-01

## 2022-01-01 RX ORDER — PIPERACILLIN AND TAZOBACTAM 4; .5 G/20ML; G/20ML
3.38 INJECTION, POWDER, LYOPHILIZED, FOR SOLUTION INTRAVENOUS ONCE
Refills: 0 | Status: DISCONTINUED | OUTPATIENT
Start: 2022-01-01 | End: 2022-01-01

## 2022-01-01 RX ORDER — CALCIUM GLUCONATE 100 MG/ML
2 VIAL (ML) INTRAVENOUS ONCE
Refills: 0 | Status: COMPLETED | OUTPATIENT
Start: 2022-01-01 | End: 2022-01-01

## 2022-01-01 RX ORDER — SODIUM CHLORIDE 9 MG/ML
1000 INJECTION INTRAMUSCULAR; INTRAVENOUS; SUBCUTANEOUS
Refills: 0 | Status: COMPLETED | OUTPATIENT
Start: 2022-01-01 | End: 2022-01-01

## 2022-01-01 RX ORDER — SODIUM CHLORIDE 9 MG/ML
500 INJECTION, SOLUTION INTRAVENOUS ONCE
Refills: 0 | Status: COMPLETED | OUTPATIENT
Start: 2022-01-01 | End: 2022-01-01

## 2022-01-01 RX ORDER — DESMOPRESSIN ACETATE 0.1 MG/1
30 TABLET ORAL ONCE
Refills: 0 | Status: COMPLETED | OUTPATIENT
Start: 2022-01-01 | End: 2023-11-13

## 2022-01-01 RX ORDER — MEROPENEM 1 G/30ML
500 INJECTION INTRAVENOUS EVERY 24 HOURS
Refills: 0 | Status: DISCONTINUED | OUTPATIENT
Start: 2022-01-01 | End: 2022-01-01

## 2022-01-01 RX ORDER — ONDANSETRON 8 MG/1
4 TABLET, FILM COATED ORAL ONCE
Refills: 0 | Status: COMPLETED | OUTPATIENT
Start: 2022-01-01 | End: 2022-01-01

## 2022-01-01 RX ORDER — DEXTROSE 50 % IN WATER 50 %
50 SYRINGE (ML) INTRAVENOUS ONCE
Refills: 0 | Status: COMPLETED | OUTPATIENT
Start: 2022-01-01 | End: 2022-01-01

## 2022-01-01 RX ORDER — MORPHINE SULFATE 50 MG/1
2 CAPSULE, EXTENDED RELEASE ORAL
Refills: 0 | Status: DISCONTINUED | OUTPATIENT
Start: 2022-01-01 | End: 2022-01-01

## 2022-01-01 RX ORDER — SODIUM CHLORIDE 9 MG/ML
1000 INJECTION INTRAMUSCULAR; INTRAVENOUS; SUBCUTANEOUS ONCE
Refills: 0 | Status: COMPLETED | OUTPATIENT
Start: 2022-01-01 | End: 2022-01-01

## 2022-01-01 RX ORDER — PIPERACILLIN AND TAZOBACTAM 4; .5 G/20ML; G/20ML
3.38 INJECTION, POWDER, LYOPHILIZED, FOR SOLUTION INTRAVENOUS EVERY 12 HOURS
Refills: 0 | Status: DISCONTINUED | OUTPATIENT
Start: 2022-01-01 | End: 2022-01-01

## 2022-01-01 RX ORDER — CHLORHEXIDINE GLUCONATE 213 G/1000ML
1 SOLUTION TOPICAL
Refills: 0 | Status: DISCONTINUED | OUTPATIENT
Start: 2022-01-01 | End: 2022-01-01

## 2022-01-01 RX ORDER — SODIUM CHLORIDE 9 MG/ML
1000 INJECTION, SOLUTION INTRAVENOUS
Refills: 0 | Status: COMPLETED | OUTPATIENT
Start: 2022-01-01 | End: 2022-01-01

## 2022-01-01 RX ORDER — HYDROMORPHONE HYDROCHLORIDE 2 MG/ML
0.25 INJECTION INTRAMUSCULAR; INTRAVENOUS; SUBCUTANEOUS ONCE
Refills: 0 | Status: DISCONTINUED | OUTPATIENT
Start: 2022-01-01 | End: 2022-01-01

## 2022-01-01 RX ORDER — DEXTROSE 50 % IN WATER 50 %
25 SYRINGE (ML) INTRAVENOUS ONCE
Refills: 0 | Status: DISCONTINUED | OUTPATIENT
Start: 2022-01-01 | End: 2022-01-01

## 2022-01-01 RX ORDER — ACETAMINOPHEN 500 MG
1000 TABLET ORAL ONCE
Refills: 0 | Status: COMPLETED | OUTPATIENT
Start: 2022-01-01 | End: 2022-01-01

## 2022-01-01 RX ORDER — DESMOPRESSIN ACETATE 0.1 MG/1
30 TABLET ORAL ONCE
Refills: 0 | Status: DISCONTINUED | OUTPATIENT
Start: 2022-01-01 | End: 2022-01-01

## 2022-01-01 RX ORDER — CEFEPIME 1 G/1
INJECTION, POWDER, FOR SOLUTION INTRAMUSCULAR; INTRAVENOUS
Refills: 0 | Status: DISCONTINUED | OUTPATIENT
Start: 2022-01-01 | End: 2022-01-01

## 2022-01-01 RX ADMIN — Medication 5 MILLIGRAM(S): at 17:30

## 2022-01-01 RX ADMIN — Medication 50 MILLIGRAM(S): at 23:51

## 2022-01-01 RX ADMIN — Medication 5 MILLIGRAM(S): at 07:00

## 2022-01-01 RX ADMIN — CHLORHEXIDINE GLUCONATE 1 APPLICATION(S): 213 SOLUTION TOPICAL at 05:01

## 2022-01-01 RX ADMIN — Medication 50 MILLIGRAM(S): at 00:42

## 2022-01-01 RX ADMIN — PIPERACILLIN AND TAZOBACTAM 25 GRAM(S): 4; .5 INJECTION, POWDER, LYOPHILIZED, FOR SOLUTION INTRAVENOUS at 17:02

## 2022-01-01 RX ADMIN — PIPERACILLIN AND TAZOBACTAM 25 GRAM(S): 4; .5 INJECTION, POWDER, LYOPHILIZED, FOR SOLUTION INTRAVENOUS at 05:00

## 2022-01-01 RX ADMIN — Medication 50 MILLIGRAM(S): at 11:26

## 2022-01-01 RX ADMIN — PIPERACILLIN AND TAZOBACTAM 25 GRAM(S): 4; .5 INJECTION, POWDER, LYOPHILIZED, FOR SOLUTION INTRAVENOUS at 19:13

## 2022-01-01 RX ADMIN — PIPERACILLIN AND TAZOBACTAM 25 GRAM(S): 4; .5 INJECTION, POWDER, LYOPHILIZED, FOR SOLUTION INTRAVENOUS at 05:12

## 2022-01-01 RX ADMIN — Medication 250 MILLIGRAM(S): at 18:27

## 2022-01-01 RX ADMIN — Medication 50 MILLIGRAM(S): at 18:40

## 2022-01-01 RX ADMIN — SODIUM CHLORIDE 125 MILLILITER(S): 9 INJECTION INTRAMUSCULAR; INTRAVENOUS; SUBCUTANEOUS at 04:17

## 2022-01-01 RX ADMIN — Medication 7.16 MICROGRAM(S)/KG/MIN: at 05:12

## 2022-01-01 RX ADMIN — CHLORHEXIDINE GLUCONATE 1 APPLICATION(S): 213 SOLUTION TOPICAL at 05:14

## 2022-01-01 RX ADMIN — Medication 25 MILLILITER(S): at 05:04

## 2022-01-01 RX ADMIN — HYDROMORPHONE HYDROCHLORIDE 0.25 MILLIGRAM(S): 2 INJECTION INTRAMUSCULAR; INTRAVENOUS; SUBCUTANEOUS at 09:15

## 2022-01-01 RX ADMIN — Medication 50 MILLIGRAM(S): at 05:10

## 2022-01-01 RX ADMIN — Medication 50 MILLIGRAM(S): at 23:01

## 2022-01-01 RX ADMIN — Medication 50 MILLIGRAM(S): at 13:00

## 2022-01-01 RX ADMIN — Medication 7.16 MICROGRAM(S)/KG/MIN: at 18:27

## 2022-01-01 RX ADMIN — Medication 7.16 MICROGRAM(S)/KG/MIN: at 05:24

## 2022-01-01 RX ADMIN — Medication 50 MILLIGRAM(S): at 05:28

## 2022-01-01 RX ADMIN — Medication 50 MILLIGRAM(S): at 05:12

## 2022-01-01 RX ADMIN — PIPERACILLIN AND TAZOBACTAM 25 GRAM(S): 4; .5 INJECTION, POWDER, LYOPHILIZED, FOR SOLUTION INTRAVENOUS at 05:27

## 2022-01-01 RX ADMIN — Medication 7.16 MICROGRAM(S)/KG/MIN: at 08:47

## 2022-01-01 RX ADMIN — PIPERACILLIN AND TAZOBACTAM 200 GRAM(S): 4; .5 INJECTION, POWDER, LYOPHILIZED, FOR SOLUTION INTRAVENOUS at 18:59

## 2022-01-01 RX ADMIN — Medication 2 MILLIGRAM(S): at 16:59

## 2022-01-01 RX ADMIN — PIPERACILLIN AND TAZOBACTAM 25 GRAM(S): 4; .5 INJECTION, POWDER, LYOPHILIZED, FOR SOLUTION INTRAVENOUS at 17:44

## 2022-01-01 RX ADMIN — DESMOPRESSIN ACETATE 230 MICROGRAM(S): 0.1 TABLET ORAL at 21:55

## 2022-01-01 RX ADMIN — MIDODRINE HYDROCHLORIDE 30 MILLIGRAM(S): 2.5 TABLET ORAL at 05:28

## 2022-01-01 RX ADMIN — Medication 50 MILLIGRAM(S): at 18:23

## 2022-01-01 RX ADMIN — Medication 7.16 MICROGRAM(S)/KG/MIN: at 14:25

## 2022-01-01 RX ADMIN — MEROPENEM 100 MILLIGRAM(S): 1 INJECTION INTRAVENOUS at 05:27

## 2022-01-01 RX ADMIN — Medication 7.16 MICROGRAM(S)/KG/MIN: at 08:04

## 2022-01-01 RX ADMIN — Medication 50 MILLIGRAM(S): at 00:18

## 2022-01-01 RX ADMIN — MIDODRINE HYDROCHLORIDE 30 MILLIGRAM(S): 2.5 TABLET ORAL at 00:20

## 2022-01-01 RX ADMIN — Medication 200 GRAM(S): at 07:07

## 2022-01-01 RX ADMIN — Medication 50 MILLIGRAM(S): at 18:34

## 2022-01-01 RX ADMIN — MEROPENEM 100 MILLIGRAM(S): 1 INJECTION INTRAVENOUS at 05:10

## 2022-01-01 RX ADMIN — Medication 102 MILLIGRAM(S): at 15:13

## 2022-01-01 RX ADMIN — Medication 166.67 MILLIGRAM(S): at 13:02

## 2022-01-01 RX ADMIN — Medication 50 MILLIGRAM(S): at 12:51

## 2022-01-01 RX ADMIN — Medication 1000 MILLIGRAM(S): at 09:55

## 2022-01-01 RX ADMIN — SODIUM ZIRCONIUM CYCLOSILICATE 10 GRAM(S): 10 POWDER, FOR SUSPENSION ORAL at 03:25

## 2022-01-01 RX ADMIN — MIDODRINE HYDROCHLORIDE 30 MILLIGRAM(S): 2.5 TABLET ORAL at 14:45

## 2022-01-01 RX ADMIN — INSULIN HUMAN 5 UNIT(S): 100 INJECTION, SOLUTION SUBCUTANEOUS at 18:33

## 2022-01-01 RX ADMIN — SODIUM ZIRCONIUM CYCLOSILICATE 10 GRAM(S): 10 POWDER, FOR SUSPENSION ORAL at 18:42

## 2022-01-01 RX ADMIN — MIDODRINE HYDROCHLORIDE 20 MILLIGRAM(S): 2.5 TABLET ORAL at 22:03

## 2022-01-01 RX ADMIN — SODIUM CHLORIDE 1000 MILLILITER(S): 9 INJECTION INTRAMUSCULAR; INTRAVENOUS; SUBCUTANEOUS at 01:54

## 2022-01-01 RX ADMIN — Medication 50 MILLILITER(S): at 18:30

## 2022-01-01 RX ADMIN — CHLORHEXIDINE GLUCONATE 1 APPLICATION(S): 213 SOLUTION TOPICAL at 06:23

## 2022-01-01 RX ADMIN — HYDROMORPHONE HYDROCHLORIDE 0.5 MILLIGRAM(S): 2 INJECTION INTRAMUSCULAR; INTRAVENOUS; SUBCUTANEOUS at 08:58

## 2022-01-01 RX ADMIN — SODIUM CHLORIDE 125 MILLILITER(S): 9 INJECTION, SOLUTION INTRAVENOUS at 09:57

## 2022-01-01 RX ADMIN — Medication 7.16 MICROGRAM(S)/KG/MIN: at 22:53

## 2022-01-01 RX ADMIN — CHLORHEXIDINE GLUCONATE 1 APPLICATION(S): 213 SOLUTION TOPICAL at 05:27

## 2022-01-01 RX ADMIN — CHLORHEXIDINE GLUCONATE 1 APPLICATION(S): 213 SOLUTION TOPICAL at 05:08

## 2022-01-01 RX ADMIN — Medication 7.16 MICROGRAM(S)/KG/MIN: at 16:32

## 2022-01-01 RX ADMIN — Medication 50 MILLIGRAM(S): at 13:56

## 2022-01-01 RX ADMIN — MIDODRINE HYDROCHLORIDE 30 MILLIGRAM(S): 2.5 TABLET ORAL at 13:48

## 2022-01-01 RX ADMIN — MEROPENEM 100 MILLIGRAM(S): 1 INJECTION INTRAVENOUS at 05:12

## 2022-01-01 RX ADMIN — PIPERACILLIN AND TAZOBACTAM 25 GRAM(S): 4; .5 INJECTION, POWDER, LYOPHILIZED, FOR SOLUTION INTRAVENOUS at 19:30

## 2022-01-01 RX ADMIN — ONDANSETRON 4 MILLIGRAM(S): 8 TABLET, FILM COATED ORAL at 08:16

## 2022-01-01 RX ADMIN — Medication 25 MILLILITER(S): at 07:08

## 2022-01-01 RX ADMIN — Medication 50 MILLIGRAM(S): at 05:08

## 2022-01-01 RX ADMIN — MIDODRINE HYDROCHLORIDE 10 MILLIGRAM(S): 2.5 TABLET ORAL at 10:04

## 2022-01-01 RX ADMIN — SODIUM CHLORIDE 500 MILLILITER(S): 9 INJECTION, SOLUTION INTRAVENOUS at 09:10

## 2022-01-01 RX ADMIN — Medication 7.16 MICROGRAM(S)/KG/MIN: at 09:57

## 2022-01-01 RX ADMIN — CHLORHEXIDINE GLUCONATE 1 APPLICATION(S): 213 SOLUTION TOPICAL at 05:11

## 2022-01-01 RX ADMIN — SODIUM CHLORIDE 125 MILLILITER(S): 9 INJECTION, SOLUTION INTRAVENOUS at 19:26

## 2022-01-01 RX ADMIN — MIDODRINE HYDROCHLORIDE 20 MILLIGRAM(S): 2.5 TABLET ORAL at 05:01

## 2022-01-01 RX ADMIN — MIDODRINE HYDROCHLORIDE 30 MILLIGRAM(S): 2.5 TABLET ORAL at 21:04

## 2022-01-01 RX ADMIN — HYDROMORPHONE HYDROCHLORIDE 0.5 MILLIGRAM(S): 2 INJECTION INTRAMUSCULAR; INTRAVENOUS; SUBCUTANEOUS at 09:15

## 2022-01-01 RX ADMIN — CHLORHEXIDINE GLUCONATE 1 APPLICATION(S): 213 SOLUTION TOPICAL at 05:12

## 2022-01-01 RX ADMIN — Medication 50 MILLIGRAM(S): at 11:34

## 2022-01-01 RX ADMIN — MIDODRINE HYDROCHLORIDE 30 MILLIGRAM(S): 2.5 TABLET ORAL at 05:26

## 2022-01-01 RX ADMIN — Medication 100 GRAM(S): at 18:37

## 2022-01-01 RX ADMIN — MIDODRINE HYDROCHLORIDE 30 MILLIGRAM(S): 2.5 TABLET ORAL at 06:23

## 2022-01-01 RX ADMIN — Medication 50 MILLIGRAM(S): at 18:27

## 2022-01-01 RX ADMIN — Medication 100 GRAM(S): at 03:25

## 2022-01-01 RX ADMIN — Medication 7.16 MICROGRAM(S)/KG/MIN: at 19:26

## 2022-01-01 RX ADMIN — MIDODRINE HYDROCHLORIDE 30 MILLIGRAM(S): 2.5 TABLET ORAL at 21:48

## 2022-01-01 RX ADMIN — PIPERACILLIN AND TAZOBACTAM 25 GRAM(S): 4; .5 INJECTION, POWDER, LYOPHILIZED, FOR SOLUTION INTRAVENOUS at 06:23

## 2022-01-01 RX ADMIN — MEROPENEM 100 MILLIGRAM(S): 1 INJECTION INTRAVENOUS at 04:35

## 2022-01-01 RX ADMIN — MIDODRINE HYDROCHLORIDE 20 MILLIGRAM(S): 2.5 TABLET ORAL at 12:10

## 2022-01-01 RX ADMIN — MIDODRINE HYDROCHLORIDE 10 MILLIGRAM(S): 2.5 TABLET ORAL at 21:47

## 2022-01-01 RX ADMIN — Medication 7.16 MICROGRAM(S)/KG/MIN: at 11:19

## 2022-01-01 RX ADMIN — INSULIN HUMAN 5 UNIT(S): 100 INJECTION, SOLUTION SUBCUTANEOUS at 07:08

## 2022-01-01 RX ADMIN — Medication 400 MILLIGRAM(S): at 09:36

## 2022-01-01 RX ADMIN — PIPERACILLIN AND TAZOBACTAM 25 GRAM(S): 4; .5 INJECTION, POWDER, LYOPHILIZED, FOR SOLUTION INTRAVENOUS at 05:01

## 2022-01-01 RX ADMIN — HYDROMORPHONE HYDROCHLORIDE 0.25 MILLIGRAM(S): 2 INJECTION INTRAMUSCULAR; INTRAVENOUS; SUBCUTANEOUS at 09:00

## 2022-12-14 NOTE — ED ADULT NURSE NOTE - OBJECTIVE STATEMENT
Pt received in bed ISO28A, 66 y/o F, A&Ox3, c/o generalized weakness, decreased appetite X 2 weeks, nosebleed X today. PMHx Uterine cancer with possible mets? PSHX hysterectomy. Pt noted to have yellow color in bilateral scleras, yellow hue to the skin. Pt noted to be bleeding from the nose, provided with gauze and educated to hold nares with pressure, HOB elevated. Pt denies PMHx. NKDA. Pt unable to ambulate at this time.  Skin intact. Family at bedside.

## 2022-12-14 NOTE — ED ADULT NURSE NOTE - NS TRANSFER PATIENT BELONGINGS
Patient to ED with cc of ankle injury after a fall down approximately 16 stairs today. Patient states that his left leg bent underneath him and his butt landing on his ankle as he fell. Patient denies head injury or loss of consciousness.    Clothing

## 2022-12-14 NOTE — ED ADULT TRIAGE NOTE - CCCP TRG CHIEF CMPLNT
Office Note  Chief Complaint   Patient presents with   • Cough     intermittent cough over last few months. Has been taking lisinopril for one year. Endorses sore throat yesterday but has resolved as of today.       Gali Little is an 48 year old female who presents with an illness characterized by dry cough and sore throat. Symptoms began 1-2 months ago and are which improve and then worsen again since that time.  Denies rattling in chest, wheezing, earache, fever, chills, sweats, facial pain, headache, myalgias, nausea, vomiting and diarrhea.    Current Outpatient Medications   Medication Sig Dispense Refill   • lisinopril (ZESTRIL) 10 MG tablet Take 10 mg by mouth daily.     • azithromycin (ZITHROMAX) 250 MG tablet Take 2 tablets on day 1, then 1 tablet each day until course is completed. 6 tablet 0   • Dextromethorphan-guaiFENesin (CORICIDIN HBP CONGESTION/COUGH)  MG Cap Take 1 capsule by mouth every 6 hours as needed (cough). 28 capsule 0   • sodium chloride (OCEAN) 0.65 % nasal spray Spray 1 spray in each nostril as needed for Congestion. 30 mL PRN   • benzonatate (TESSALON PERLES) 200 MG capsule Take 1 capsule by mouth 3 times daily as needed for Cough. 20 capsule 0     No current facility-administered medications for this visit.        ALLERGIES:  No Known Allergies    Social History     Tobacco Use   • Smoking status: Never Smoker   • Smokeless tobacco: Never Used   Substance Use Topics   • Alcohol use: Never     Frequency: Never       Visit Vitals  /77 (BP Location: LUE - Left upper extremity, Patient Position: Sitting, Cuff Size: Regular)   Pulse 71   Temp 98.5 °F (36.9 °C) (Oral)   Resp 18   Ht 5' 6\" (1.676 m)   Wt 71.7 kg (158 lb)   LMP 12/20/2019 (Exact Date)   SpO2 97%   BMI 25.50 kg/m²     General appearance: alert, in no distress  Ears: R TM - normal, L TM - normal  Nose: normal  Oropharynx: normal  Neck: Normal and no adenopathy  Lungs: clear to auscultation  Heart: regular rate and  rhythm and no murmurs, clicks, or gallops    ASSESSMENT:  Problem List Items Addressed This Visit     None      Visit Diagnoses     Acute bronchitis, unspecified organism    -  Primary          PLAN:  Antibiotic and cough med given.  OTC saline nasal spray.  Warm saline gargles.  Rest, fluids, acetaminophen, and humidification.  Follow up with PCP 1-2 weeks if no improvement, sooner if concerns or complaints.  May return to urgent care as discussed.  Pt verbalizes understanding and agrees.    Yuval Mitchell PA-C 01/02/20 5:11 PM    weakness

## 2022-12-15 NOTE — ED ADULT TRIAGE NOTE - CHIEF COMPLAINT QUOTE
Transfer from [facility] for ERCP, ct showed cholangiocarcinoma, noted to be jaundice, NS infusing from EMS, hx of metastatic uterine CA s/p radiation

## 2022-12-15 NOTE — ED PROVIDER NOTE - CLINICAL SUMMARY MEDICAL DECISION MAKING FREE TEXT BOX
67-year-old female with history of uterine cancer (finished chemo 4 months ago, with MR mention) presenting with jaundice.  Patient went to [place] with 2 weeks of painless jaundice, weakness, loss of appetite, nausea and vomiting.  Patient has no fever, chest pain, shortness of breath, abdominal pain.  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  [place] unable to get in touch with GI service, does not have GI there so patient was transferred here.  Labs showed hyperbilirubinemia, renal failure, elevated BUN, hyperkalemia.  Patient received Lokelma and 1 g calcium gluconate prior to arrival. Exam shows icteric sclerae, jaundice, tired appearing pt. No abd ttp. Will get repeat labs, consult nephro, surgery, TBA.

## 2022-12-15 NOTE — ED PROVIDER NOTE - ATTENDING CONTRIBUTION TO CARE
Attending Statement: I have personally seen and examined this patient. I have fully participated in the care of this patient. I have reviewed all pertinent clinical information, including history physical exam, plan and the Resident's note and agree except as noted  67-year-old female history of uterine cancer status post hysterectomy, chemo and radiation therapy in remission per patient and sister was transferred from [facility] for an ERCP. patient presented initially to the [facility] for weakness, decreased p.o. intake, and painless jaundice for 2 weeks.  Has had a decreased p.o. intake with nausea no vomiting.  No diarrhea.  Denies chest pain.  + Abdominal pain worsening over the last 2 weeks.  No urinary complaints.  She noticed some epistaxis yesterday none since.  No hematuria no melena.  No EtOH abuse no drug use.  Vital signs appreciated ill-appearing female laying in bed with sister at bedside ANO x3.  +icteric, +jaundice poor inspiratory effort, no retractions.  Soft diffusely tender abdomen.  Bilateral leg swelling.  No calf tenderness.  Plan EKG, repeat blood work.  IV antibiotics, pain meds as needed.  Noted to have DEMARCO with a potassium 5.8 creatinine of 7.1 patient was giving low, at 3 PM again the hospital will give hyperkalemia treatment consult nephrology.  Gastro enterology and surgery consulted as well.

## 2022-12-15 NOTE — ED PROVIDER NOTE - OBJECTIVE STATEMENT
67-year-old female with history of uterine cancer (finished chemo 4 months ago, with MR mention) presenting with jaundice.  Patient went to [place] with 2 weeks of painless jaundice, weakness, loss of appetite, nausea and vomiting.  Patient has no fever, chest pain, shortness of breath, abdominal pain.  CT [place] found to have cholangiocarcinoma with metastatic disease to liver with concern for poor venous thrombosis.  [place] unable to get in touch with GI service, does not have GI there so patient was transferred here.  Labs showed hyperbilirubinemia, renal failure, elevated BUN, hyperkalemia.  Patient received Lokelma and 1 g calcium gluconate prior to arrival.

## 2022-12-15 NOTE — H&P ADULT - HISTORY OF PRESENT ILLNESS
67F PMH uterine ca (s/p hysterectomy, chemo/RT, in remission as of 4 months ago), BIBEMS to [place], p/w 2 weeks painless jaundice, 2 days anorexia, vomiting with po intake, generalized weakness, 1 day epistaxis; found to have likely metastatic cholangiocarcinoma and acute renal failure (K 5.8, Cr 7.29); transferred to [facility] ED for advanced GI evaluation.     CT +intrahepatic ductal dilation, diffuse mets, cholangiocarcinoma, CXR +infiltrates suspicious for mets  no fever/cough, chest pain.    K 5.9 at OSH/ED, received calcium gluconate and Lokelma.    == template placeholder draft - patient to be seen - plan not finalized till attested by attending ==     67F PMH uterine ca (s/p hysterectomy, chemo/RT, in remission as of 4 months ago), BIBEMS to [place], p/w 2 weeks painless jaundice, 2 days anorexia, vomiting with po intake, generalized weakness, 1 day epistaxis; found to have likely metastatic cholangiocarcinoma and acute renal failure (K 5.8, Cr 7.29); transferred to [facility] ED for advanced GI evaluation.     CT +intrahepatic ductal dilation, diffuse mets, cholangiocarcinoma, CXR +infiltrates suspicious for mets  no fever/cough, chest pain.    K 5.9 at OSH/ED, received calcium gluconate and Lokelma.    67F PMH uterine ca (s/p hysterectomy, chemo/RT, in remission as of 4 months ago), BIBEMS to [place], p/w 2 weeks painless jaundice and progressive failure to thrive, 1 day epistaxis and vaginal bleeding; found to have likely metastatic cholangiocarcinoma and acute renal failure (K 5.8, Cr 7.29); transferred to [facility] ED for further management. Patient was at usual state of health performing IADLs until 2 weeks prior to admission when she developed scleral icterus, pruritus, intolerance of solid food, and reduced po intake. She endorsed a feeling of prolonged fullness with meals that would eventually on occasion prompt her to induce vomiting. Last had broth on 12/14 lunch 1 day prior to admission; had tea this morning.    K 5.9 at OSH/ED, received calcium gluconate and Lokelma.    67F PMH uterine ca (s/p hysterectomy, chemo/RT, in remission as of 4 months ago), BIBEMS to [place], p/w 2 weeks painless jaundice and progressive failure to thrive, 1 day epistaxis and vaginal bleeding; found to have likely metastatic cholangiocarcinoma and acute renal failure (K 5.8, Cr 7.29); transferred to [facility] ED for further management. Patient was at usual state of health performing IADLs until 2 weeks prior to admission when she developed scleral icterus, pruritus, intolerance of solid food, and reduced po intake. She endorsed a feeling of prolonged fullness with meals that would eventually on occasion prompt her to induce vomiting. Last had broth on 12/14 lunch 1 day prior to admission; had tea this morning. Also endorsed HERNANDEZ, "puffy" abdomen, light colored stools (at bedside), +dark urine. No fevers, chills, confusion, tremors, abdominal pain, nausea/vomiting, sick contacts, bloody vomit/bloody stool, petechial rash/bruising/other bleeding.  K 5.9 at OSH/ED, received calcium gluconate and Lokelma.    Ms. Mckinney is a 67 F PMH uterine ca (s/p hysterectomy, chemo/RT, in remission as of 4 months ago), BIBEMS to [place], p/w 2 weeks painless jaundice and progressive failure to thrive, 1 day epistaxis and vaginal bleeding; found to have likely metastatic cholangiocarcinoma and acute renal failure (K 5.8, Cr 7.29); transferred to [facility] ED for further management.     Patient was at usual state of health performing IADLs until 2 weeks prior to admission when she developed scleral icterus, pruritus, intolerance of solid food, and reduced PO intake. She endorsed a feeling of prolonged fullness with meals that would eventually on occasion prompt her to induce vomiting. Last had broth on 12/14 lunch 1 day prior to admission; had tea this morning. Also endorsed HERNANDEZ, "puffy" abdomen, light colored stools (at bedside), +dark urine. No fevers, chills, confusion, tremors, abdominal pain, sick contacts, bloody vomit/bloody stool, petechial rash/bruising/other bleeding.    Notably K 5.9 at OSH/ED, received calcium gluconate and Lokelma.

## 2022-12-15 NOTE — CONSULT NOTE ADULT - SUBJECTIVE AND OBJECTIVE BOX
HPI: 67F PMH uterine ca (s/p hysterectomy, chemo/RT, in remission as of 4 months ago), BIBEMS to [place], p/w 2 weeks painless jaundice, 2 days anorexia, vomiting with po intake, generalized weakness, 1 day epistaxis; found to have likely metastatic cholangiocarcinoma and acute renal failure (K 5.8, Cr 7.29); transferred to [facility] ED for advanced GI evaluation.     CT A/P w/ IV contrast concerning for intrahepatic ductal dilation, diffuse mets, cholangiocarcinoma, CXR +infiltrates suspicious for mets as well as adrenal lesion suspicious for metastasis. No fever/cough, chest pain. K 5.9 at OSH/ED, received calcium gluconate and Lokelma.    In ED she is stable but with bilirubin of 35. No concerns for cholangitis. No previous records of workup for cholangio found.     PMH: As above    PSH: As above    Allergies: NKDA    Physical exam:    /60 HR 82  SpO2  97 T 97.3 RR 16    General- NAD. Pleasant and responsive. Jaundiced  Abdomen- Soft abdomen. Non tender  Lungs- Comfortable on room air      Imaging:    INTERPRETATION: PROCEDURE INFORMATION:  Exam: CT Abdomen And Pelvis Without Contrast  Exam date and time: 12/15/2022 3:15 AM  Age: 67 years old  Clinical indication: Jaundice, vomting    TECHNIQUE:  Imaging protocol: Computed tomography of the abdomen and pelvis without  contrast.    COMPARISON: None available.      FINDINGS:  Lungs: Consolidation in the lingula and right middle lobe with air bronchograms  may represent atelectasis versus pneumonia. Increased interstitial markings and  areas of scarring/atelectasis in bilateral lower lobes.  Pleural spaces: Trace bilateral pleural effusions.  Heart: Cardiomegaly.  Mediastinal space: Hyperdensity in the esophagus and stomach likely ingested  material.    Liver: Lesion in the right lobe of the liver measuring 23 x 22 x 22 mm.  Multiple other lesions are seen in the left lobe of the liver, largest one measuring 29 x 34 x 27.3 mm. Findings are concerning for metastatic disease. There are linear hyperdensities along the dilated intrahepatic bile ducts. This could be related to hyperdense thrombosed portal veins.  Gallbladder and bile ducts: Moderate intrahepatic ductal dilatation.  Low-attenuation area in the baldomero hepatis measuring 27.5 x 24.4 mm. Abnormal  appearing gallbladder with hyperdensity within it and moderate surrounding  edema. No calcified stones are seen.  Pancreas: Enlarged pancreatic head and uncinate process. Mild dilatation of the  pancreatic duct in the distal body and tail of the pancreas.  Spleen: Normal. No splenomegaly.  Adrenal glands: Abnormal appearing enlarged bilateral adrenal glands, right  measuring 30.6 x 19.4 mm and left measuring 29 x 24.2 mm.  Kidneys and ureters: Simple cyst measuring 17 mm in the upper pole of the right  kidney. Left kidney is unremarkable.  Stomach and bowel: Thickening of the mid to distal stomach and 1st part of the  duodenum with surrounding inflammation. No bowel obstruction. Limited evaluation of the colon due to collapsed state.  Appendix: Appendix is not seen.    Intraperitoneal space: Small ascites. Mesenteric edema.  Vasculature: Mild atherosclerosis.  Lymph nodes: Multiple subcentimeter small retroperitoneal lymph nodes and  possibly mesenteric lymph nodes difficult to evaluate secondary to absence of enteric contrast.  Urinary bladder: Urinary bladder is not well distended otherwise unremarkable.  Reproductive: Status post hysterectomy.  Bones/joints: Demineralization of the bones with degenerative changes.  Soft tissues: Small left paraumbilical fat containing hernia.    IMPRESSION:  1. Limited study secondary to motion artifact and absence of contrast.  2. Abnormal appearing gallbladder, baldomero hepatis, with marked intrahepatic  ductal dilatation and multiple lesions in the liver.  3. Thickening of the distal stomach, proximal duodenum, and enlargement of the  pancreatic head and uncinate process. 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 Concern for portal venous thrombosis and resultant hyperdense portal veins. Further evaluation  with contrast enhanced MRI and MRCP examination is recommended.  4. Enlarged bilateral adrenal glands.  5. Small ascites.  6. Multiple other findings as described in detail above.    --- End of Report ---

## 2022-12-15 NOTE — ED ADULT NURSE REASSESSMENT NOTE - NS ED NURSE REASSESS COMMENT FT1
Pt sleeping in bed, appears in NAD, on cardiac monitor, maintaining on room air, respirations even and unlabored. Family at bedside. Pt unable to produce urine all night, Dr. Monroy informed. No further orders at this time. Will continue to monitor.

## 2022-12-15 NOTE — CONSULT NOTE ADULT - SUBJECTIVE AND OBJECTIVE BOX
[facility] DIVISION OF KIDNEY DISEASES AND HYPERTENSION -- [phone]  -- INITIAL CONSULT NOTE  --------------------------------------------------------------------------------  HPI: 68 yo F with h/o uterine cancer, received chemotherapy and radiation (last treatment ~4 months ago at [place])  initially presented to [facility] on 12/14 for painless jaundice, decreased appetite, nausea/vomiting, and generalized weakness. 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 Pt was transferred to [facility] on 12/15 for GI evaluation and possible ERCP. Upon review of [place], SCr was initially 7.29 this morning, and is elevated but improved to 5.65 with IV fluids. No prior labs are available for review, exact duration of SCr elevation is unclear. Nephrology was consulted for DEMARCO with hyperkalemia.    Pt was seen and evaluated with sister present in the ER. Pt denies any known prior h/o kidney disease. Denies any recent NSAID use. Pt endorses that her urine output has decreased over the past 2 days. No blood in urine or foamy urine. Pt endorses fatigue, decreased appetite, nausea/vomiting, and yellowing of skin and eyes worsening over the past 2 weeks. Denies any headaches, fevers/chills, chest pain, palpitations, SOB, abdominal pain, and leg swelling.     PAST HISTORY  --------------------------------------------------------------------------------  PAST MEDICAL & SURGICAL HISTORY:  Uterine cancer        FAMILY HISTORY: No family h/o kidney disease    PAST SOCIAL HISTORY: Denies ETOH and tobacco use    ALLERGIES & MEDICATIONS  --------------------------------------------------------------------------------  Allergies    No Known Allergies    Intolerances      Standing Inpatient Medications  dextrose 50% Injectable 25 Gram(s) IV Push once  dextrose 50% Injectable 12.5 Gram(s) IV Push once  dextrose 50% Injectable 25 Gram(s) IV Push once  dextrose Oral Gel 15 Gram(s) Oral once  glucagon  Injectable 1 milliGRAM(s) IntraMuscular once    PRN Inpatient Medications      REVIEW OF SYSTEMS  --------------------------------------------------------------------------------  Gen: +fatigue  Skin: +jaundice  Head/Eyes/Ears: Normal hearing,   Respiratory: No dyspnea, cough  CV: No chest pain  GI: +decreased appetite, nausea, vomiting, and decreased PO intake  : No dysuria, hematuria  MSK: No  edema  Heme: No easy bruising or bleeding  Psych: No significant depression    All other systems were reviewed and are negative, except as noted.    VITALS/PHYSICAL EXAM  --------------------------------------------------------------------------------  T(C): 36.3 (12-15-22 @ 14:29), Max: 36.8 (12-15-22 @ 02:54)  HR: 82 (12-15-22 @ 14:29) (82 - 93)  BP: 100/60 (12-15-22 @ 14:29) (100/60 - 129/69)  RR: 16 (12-15-22 @ 14:29) (16 - 20)  SpO2: 97% (12-15-22 @ 14:29) (95% - 100%)  Wt(kg): --  Height (cm): 170.2 (12-14-22 @ 22:28)  Weight (kg): 72.6 (12-14-22 @ 22:28)  BMI (kg/m2): 25.1 (12-14-22 @ 22:28)  BSA (m2): 1.84 (12-14-22 @ 22:28)      Physical Exam:  Gen: ill appearing  HEENT: dry mucous membranes, +scleral icterus  Pulm: CTA B/L  CV: S1S2  Abd: Soft, +BS   Ext: No LE edema B/L  Neuro: Awake and alert  Skin: +jaundice  Vascular access: Peripheral IV    LABS/STUDIES  --------------------------------------------------------------------------------              10.2   6.21  >-----------<  217      [12-15-22 @ 18:20]              29.1     134  |  100  |  95  ----------------------------<  72      [12-15-22 @ 18:20]  6.4   |  11  |  5.65        Ca     9.2     [12-15-22 @ 18:20]    TPro  5.8  /  Alb  2.8  /  TBili  32.7  /  DBili  x   /  AST  132  /  ALT  36  /  AlkPhos  516  [12-15-22 @ 18:20]    PT/INR: PT 27.7 , INR 2.37       [12-15-22 @ 18:20]  PTT: 43.6       [12-15-22 @ 18:20]      Creatinine Trend:  SCr 5.65 [12-15 @ 18:20]  SCr 7.19 [12-15 @ 11:28]  SCr 7.29 [12-15 @ 01:30]    Urinalysis - [12-15-22 @ 09:30]      Color Katina / Appearance Clear / SG 1.015 / pH 6.0      Gluc Negative / Ketone Trace  / Bili Large / Urobili 4       Blood Large / Protein 100 / Leuk Est Moderate / Nitrite Positive      RBC 25-50 / WBC 11-25 / Hyaline  / Gran  / Sq Epi  / Non Sq Epi Moderate / Bacteria Moderate [facility] DIVISION OF KIDNEY DISEASES AND HYPERTENSION -- [phone]  -- INITIAL CONSULT NOTE  --------------------------------------------------------------------------------  HPI: 66 yo F with history of uterine cancer, received chemotherapy and radiation (last treatment ~4 months ago at [place])  initially presented to [facility] on 12/14 for painless jaundice, decreased appetite, nausea/vomiting, and generalized weakness. 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 Pt was transferred to [facility] on 12/15 for GI evaluation and possible ERCP. Upon review of [place], SCr was initially 7.29 this morning, and is elevated but improved to 5.65 with IV fluids. No prior labs are available for review, exact duration of SCr elevation is unclear. Nephrology was consulted for DEMARCO with hyperkalemia.    Pt was seen and evaluated with sister present in the ER. Pt denies any known prior history of kidney disease. Denies any recent NSAID use. Pt endorses that her urine output has decreased over the past 2 days. No blood in urine or foamy urine. Pt endorses fatigue, decreased appetite, nausea/vomiting, and yellowing of skin and eyes worsening over the past 2 weeks. Denies any headaches, fevers/chills, chest pain, palpitations, SOB, abdominal pain, and leg swelling.     PAST HISTORY  --------------------------------------------------------------------------------  PAST MEDICAL & SURGICAL HISTORY:  Uterine cancer        FAMILY HISTORY: No family h/o kidney disease    PAST SOCIAL HISTORY: Denies ETOH and tobacco use    ALLERGIES & MEDICATIONS  --------------------------------------------------------------------------------  Allergies    No Known Allergies    Intolerances    Standing Inpatient Medications  dextrose 50% Injectable 25 Gram(s) IV Push once  dextrose 50% Injectable 12.5 Gram(s) IV Push once  dextrose 50% Injectable 25 Gram(s) IV Push once  dextrose Oral Gel 15 Gram(s) Oral once  glucagon  Injectable 1 milliGRAM(s) IntraMuscular once    PRN Inpatient Medications    REVIEW OF SYSTEMS  --------------------------------------------------------------------------------  Gen: +fatigue  Skin: +jaundice  Head/Eyes/Ears: Normal hearing,   Respiratory: No dyspnea, cough  CV: No chest pain  GI: +decreased appetite, nausea, vomiting, and decreased PO intake  : No dysuria, hematuria  MSK: No  edema  Heme: No easy bruising or bleeding  Psych: No significant depression    All other systems were reviewed and are negative, except as noted.    VITALS/PHYSICAL EXAM  --------------------------------------------------------------------------------  T(C): 36.3 (12-15-22 @ 14:29), Max: 36.8 (12-15-22 @ 02:54)  HR: 82 (12-15-22 @ 14:29) (82 - 93)  BP: 100/60 (12-15-22 @ 14:29) (100/60 - 129/69)  RR: 16 (12-15-22 @ 14:29) (16 - 20)  SpO2: 97% (12-15-22 @ 14:29) (95% - 100%)  Wt(kg): --  Height (cm): 170.2 (12-14-22 @ 22:28)  Weight (kg): 72.6 (12-14-22 @ 22:28)  BMI (kg/m2): 25.1 (12-14-22 @ 22:28)  BSA (m2): 1.84 (12-14-22 @ 22:28)    Physical Exam:    Gen: ill appearing  HEENT: dry mucous membranes, +scleral icterus  Pulm: CTA B/L  CV: S1S2+  Abd: Soft, +BS   Ext: No LE edema B/L  Neuro: Awake and alert  Skin: +Jaundiced  Vascular access: Peripheral IV    LABS/STUDIES  --------------------------------------------------------------------------------              10.2   6.21  >-----------<  217      [12-15-22 @ 18:20]              29.1     134  |  100  |  95  ----------------------------<  72      [12-15-22 @ 18:20]  6.4   |  11  |  5.65        Ca     9.2     [12-15-22 @ 18:20]    TPro  5.8  /  Alb  2.8  /  TBili  32.7  /  DBili  x   /  AST  132  /  ALT  36  /  AlkPhos  516  [12-15-22 @ 18:20]    12-16    135  |  98  |  66<H>  ----------------------------<  105<H>  4.7   |  19<L>  |  4.52<H>    Ca    9.1      16 Dec 2022 10:11  Phos  4.6     12-16  Mg     2.30     12-16    TPro  5.4<L>  /  Alb  2.7<L>  /  TBili  32.3<H>  /  DBili  x   /  AST  123<H>  /  ALT  37<H>  /  AlkPhos  514<H>  12-16    Creatinine Trend:  SCr 5.65 [12-15 @ 18:20]  SCr 7.19 [12-15 @ 11:28]  SCr 7.29 [12-15 @ 01:30]    Urinalysis - [12-15-22 @ 09:30]      Color Katina / Appearance Clear / SG 1.015 / pH 6.0      Gluc Negative / Ketone Trace  / Bili Large / Urobili 4       Blood Large / Protein 100 / Leuk Est Moderate / Nitrite Positive      RBC 25-50 / WBC 11-25 / Hyaline  / Gran  / Sq Epi  / Non Sq Epi Moderate / Bacteria Moderate

## 2022-12-15 NOTE — CONSULT NOTE ADULT - ASSESSMENT
68 y/o F p/w fatigue possibly from biliary malignancy.      -Admit to Medicine  -Recommend GI/IR consult for EUS/FNA as no tissue diagnosis exists  -Recommend tumor markers (CA 19-9, CEA, CA-125)  -Surgery will follow    Seen with Dr. Hernandez in ED    Che De Leon MD  PGT-2  Surgical Oncology  #[id number] 68 y/o F p/w fatigue possibly from biliary malignancy.      -Admit to Medicine  -Recommend GI/IR consult for EUS/FNA as no tissue diagnosis exists  -Recommend tumor markers (CA 19-9, CEA, CA-125)  -Needs to be evaluated for HD needs  -Surgery will follow    Seen with Dr. Hernandez in ED    Che De Leon MD  PGT-2  Surgical Oncology  #[id number]

## 2022-12-15 NOTE — CONSULT NOTE ADULT - PROBLEM SELECTOR RECOMMENDATION 9
Pt with severe DEMARCO in the setting of hypotension, decreased PO intake, and hyperbilirubinemia. Pt denies any prior known h/o kidney disease. Upon review of [place], SCr was initially 7.29 this morning, and is elevated but improved to 5.65 with IV fluids. No prior labs are available for review, exact duration of SCr elevation is unclear. UA shows hematuria, and proteinuria. Bilirubin level is 32.7. No evidence of obstruction on CT abdomen. Pt with likely ATN vs bile cast nephropathy vs pre-renal etiology. Plan for HD tonight due to hyperkalemia resistant to medical therapy and worsening metabolic acidosis. HD consent obtained and placed in pt's chart. Repeat UA. Obtain urine protein, urine creatinine, and urine sodium. Obtain renal US. Monitor labs and urine output. Avoid nephrotoxins. Dose medications as per eGFR. Pt with severe DEMARCO in the setting of hypotension, decreased PO intake, and hyperbilirubinemia. Pt denies any prior known history of kidney disease. Upon review of [place] HIE, Scr was initially 7.29 this morning, and is elevated but improved to 5.65 with IV fluids. No prior labs are available for review, exact duration of Scr elevation is unclear. UA shows hematuria, and proteinuria. Bilirubin level significantly elevated at 32.7. No evidence of obstruction on CT abdomen. Pt with likely ATN vs bile cast nephropathy vs pre-renal etiology. Plan for HD tonight due to hyperkalemia resistant to medical therapy and worsening metabolic acidosis. HD consent obtained and placed in pt's chart. Repeat UA. Obtain urine protein, urine creatinine, and urine sodium. Obtain renal US. Monitor labs and urine output. Avoid nephrotoxins. Dose medications as per eGFR.

## 2022-12-15 NOTE — ED PROVIDER NOTE - PROGRESS NOTE DETAILS
pt signed out to me from dr salas, pt presented with painless jaundice, in remission for uterine cancer, labs show elevate lfts, ct shows intrahepatic ductal dilatation, pt pending admission dr leggett in ER , case discussed, pt will need ERCP, however he did reach out to GI who states that pt will require transfer, ercp is not done at [facility] transfer center called, gi paged, will call back transfer center unable to get GI, report given to the ER to Dr Crouch Manasa LIN PGY-3: Patient endorsed to nephrology for dialysis initiation.  Patient noted to have potassium of 5.9.  1 gram calcium gluconate given at the outside hospital.  Will give additional 1 g calcium here in the ED as well as 5 units of insulin and D50.  We will also give Lokelma. K is 6.4 (mild hemolysis) - as per Nephro, patient needs urgent dialysis. Patient was going to be admitted to Medicine service prior to Nephro recs; Medicine called MICU who will accept the patient. Will put the disposition in now.     Trina Castelan MD, PGY2

## 2022-12-15 NOTE — CHART NOTE - NSCHARTNOTEFT_GEN_A_CORE
: MD Rey    INDICATION: Ascites, Renal Failure    PROCEDURE:  [x] LIMITED ECHO  [x] LIMITED CHEST  [x] LIMITED RETROPERITONEAL  [x] LIMITED ABDOMINAL  [ ] LIMITED DVT  [ ] NEEDLE GUIDANCE VASCULAR  [ ] NEEDLE GUIDANCE THORACENTESIS  [ ] NEEDLE GUIDANCE PARACENTESIS  [ ] NEEDLE GUIDANCE PERICARDIOCENTESIS  [ ] OTHER    FINDINGS:  Thoracic: B-lines anteriorly on the R, A-line predominant pattern on the L with lung sliding. No basilar consolidations or pleural effusions appreciated  Cardiac: Grossly preserved LV systolic function. LVOT VTI 21.7 by PW doppler. LV > RV. IVC appears large.  Abdominal: Kathy-hepatic ascites with some septations, small. Trace ascites in the lower quadrants, simple in appearance.  Retroperitoneal: R renal cyst. No hydronephrosis.    INTERPRETATION:  Anterior B-lines on the R hemithorax, otherwise grossly normal thoracic ultrasound. Grossly normal goal-directed echocardiogram. Small ascites, no safe window for diagnostic paracentesis. R renal cyst, no evidence of hydronephrosis.    Images stored in [email or web address]

## 2022-12-15 NOTE — ED PROVIDER NOTE - PHYSICAL EXAMINATION
General appearance: appears tired, conversant, afebrile    Eyes: icteric sclerae, MONY, EOMI   HENT: Atraumatic; oropharynx clear, MMM and no ulcerations, no pharyngeal erythema or exudate   Neck: Trachea midline; Full range of motion, supple   Pulm: CTA bl, normal respiratory effort and no intercostal retractions, normal work of breathing   CV: RRR, No murmurs, rubs, or gallops.    Abdomen: Soft, non-tender, non-distended; no guarding or rebound   Extremities: No peripheral edema or extremity lymphadenopathy.    Skin: Jaundice, Dry, normal temperature, turgor and texture; no rash, ulcers or subcutaneous nodules   Psych: Appropriate affect, cooperative; alert and oriented to person, place and time

## 2022-12-15 NOTE — ED PROVIDER NOTE - CLINICAL SUMMARY MEDICAL DECISION MAKING FREE TEXT BOX
Patient with painless abdominal vomiting and jaundice, uterine cancer history.  VSS.  Lab findings as expected, patient also in renal failure.  Ct abdomen still pending.  Patient expected to be admitted or transferred to higher level of care, CT read still pending.  Case signed out to Dr. Wagner, incoming physician for ongoing management. Patient with painless abdominal vomiting and jaundice, uterine cancer history.  VSS.  Lab findings as expected, patient also in renal failure.  Ct abdomen shows diffuse meds and cholangiocarcinoma.  Patient expected to be admitted or transferred to higher level of care.  Case signed out to Dr. Wagner, incoming physician for ongoing management pending either admission to medicine or transfer. Patient with painless abdominal vomiting and jaundice, uterine cancer history.  VSS.  Lab findings as expected, patient also in renal failure.  Ct abdomen shows diffuse meds and cholangiocarcinoma.  CXR with infiltrates, no fevers or cough, suspect metastatic disease.  REsults d/w family.  Epistaxis resolved, likely secondary to reduced coagulation from liver pathology.  Patient expected to be admitted or transferred to higher level of care.  Case signed out to Dr. Wagner, incoming physician for ongoing management pending either admission to medicine or transfer.

## 2022-12-15 NOTE — ED PROVIDER NOTE - PHYSICAL EXAMINATION
Gen: Alert, NAD, ill appearing  Head: NC, AT, EOMI, normal lids, scleral icterus  ENT: normal hearing, patent oropharynx without erythema/exudate, uvula midline  Neck: +supple, no tenderness, +Trachea midline  Pulm: Bilateral BS, normal resp effort, no wheeze/stridor/retractions  CV: RRR, no M/R/G, +dist pulses  Abd: soft, NT/ND, Negative [place] signs, +BS, no palpable masses  Mskel: no edema/erythema/cyanosis  Skin: no rash, warm/dry, +diffuse jaundice  Neuro: AAOx3, no apparent sensory/motor deficits, coordination intact Gen: Alert, NAD, ill appearing  Head: NC, AT, EOMI, normal lids, scleral icterus  ENT: normal hearing, patent oropharynx without erythema/exudate, uvula midline, dark blood in both nares, no active bleeding, no septal hematoma  Neck: +supple, no tenderness, +Trachea midline  Pulm: Bilateral BS, normal resp effort, no wheeze/stridor/retractions  CV: RRR, no M/R/G, +dist pulses  Abd: soft, NT/ND, Negative [place] signs, +BS, no palpable masses  Mskel: no edema/erythema/cyanosis  Skin: no rash, warm/dry, +diffuse jaundice  Neuro: AAOx3, no apparent sensory/motor deficits, coordination intact

## 2022-12-15 NOTE — CONSULT NOTE ADULT - PROBLEM SELECTOR RECOMMENDATION 3
Pt with high anion gap metabolic acidosis in the setting of acute renal failure. pH is 7.27, and SCO2 is 11. Plan for HD, as above. Monitor pH and SCO2.     Plan discussed with attending on call.    If you have any questions, please feel free to contact me  Timothy Atkinson  Nephrology Fellow  [phone] / [employer] Teams(Preferred)  (After 5pm or on weekends please page the on-call fellow) Pt with high anion gap metabolic acidosis in the setting of DEMARCO. pH is 7.27, and SCO2 is 11. Plan for HD, as above. Monitor pH and SCO2.     Plan discussed with attending on call.    If you have any questions, please feel free to contact me  Timothy Atkinson  Nephrology Fellow  [phone] / [employer] Teams(Preferred)  (After 5pm or on weekends please page the on-call fellow)

## 2022-12-15 NOTE — CONSULT NOTE ADULT - PROBLEM SELECTOR RECOMMENDATION 2
Pt with hyperkalemia in the setting of acute renal failure. Serum potassium was initially 5.8. Pt received IV insulin/D50, Lokelma, and calcium gluconate. Serum potassium is now 6.4, although specimen is mildly hemolyzed. Plan for HD, as above. Monitor serum potassium. Pt with hyperkalemia in the setting of DEMARCO.  Serum potassium was initially 5.8. Pt received IV insulin/D50, Lokelma, and calcium gluconate. Serum potassium is now 6.4, although specimen is mildly hemolyzed. Plan for HD, as above. Monitor serum potassium.

## 2022-12-15 NOTE — ED PROVIDER NOTE - PROGRESS NOTE DETAILS
Manasa LIN PGY-3: Nephrology consulted.  Potassium 5.9, patient received 1 g calcium gluconate at outside hospital, will give 1 g calcium gluconate here in the emergency department.  We will also give Lokelma, 5 units of regular insulin, and D50.  Surgery was also consulted  for surgical oncology consultation. Patient to be admitted to MICU for urgent dialysis as per nephrology recommendations    Trina Castelan MD, PGY2

## 2022-12-15 NOTE — ED ADULT NURSE NOTE - OBJECTIVE STATEMENT
Receive pt. in ER room 11 alert and oriented x 3, presenting to the ER transferred from [facility] for jaundice of the eyes and ERCP. Pt. has no c/o pain no respiratory distress. Both eyes with jaundice, pt. c/o itching of her skin. Medicated as ordered, labs sent. Call bell place within reach.

## 2022-12-15 NOTE — ED PROVIDER NOTE - CARE PLAN
Principal Discharge DX:	Jaundice   1 Principal Discharge DX:	Cholangiocarcinoma  Secondary Diagnosis:	Metastatic cancer  Secondary Diagnosis:	Acute renal failure

## 2022-12-15 NOTE — H&P ADULT - NSHPREVIEWOFSYSTEMS_GEN_ALL_CORE
Denied chest pain or dyspnea  Itching  Bleeding  Brain fog  Urine   Urine color  Icterus Jaundice  Abd pain  Swelling, shortness of breath  Lethargic  Asterixis Denied chest pain or dyspnea  No confusion  Jaundice, scleral icterus  No abdominal pain at rest, no nausea/vomiting  Skin itching, darkening of urine  Swelling, shortness of breath  Asterixis  No petechial rash, bruising, other bleeding. No bloody vomit/stool.  No fever  No sick contacts  Occasional epistaxis Denied chest pain or dyspnea  No confusion  Jaundice, scleral icterus  No abdominal pain at rest, no nausea/vomiting  Skin itching, darkening of urine  Occasional epistaxis. 1 episode vaginal bleeding.  No petechial rash, bruising, other bleeding. No bloody vomit/stool.  No fever  No sick contacts    ?Swelling, shortness of breath, Asterixis  Constitutional: No fevers, chills, weight loss, or weight gain  HEENT: No vision changes, eye pain, nasal congestion, rhinorrhea, sore throat, dysphagia  CV: No chest pain or palpitations  Resp: No cough, dyspnea, wheezing, or hemoptysis  GI: No nausea, vomiting, diarrhea, constipation, abdominal pain  : No dysuria or hematuria  Musculoskeletal: No myalgia or arthralgia  Skin: No rashes or itching  Neurological: No headache, weakness, numbness, or tingling  Psychiatric: No anxiety, no depression  Endocrine: No diabetes, no thyroid problem  Hematologic/Lymphatic: No swollen lymph nodes, no epistaxis, no bleeding gums  Allergic/Immunologic: No itchy eyes, no nasal discharge CONSTITUTIONAL: No fatigue  EYES: No double vision, blurry vision  ENT: + Epistaxis  CV: No chest pain, palpitations  PULM: No cough, shortness of breath  GI: Decreased appetite, nausea, vomiting, bloating  : +Vaginal bleeding, dark urine  SKIN: +Jaundice, pruritus  MSK: No muscle aches  NEURO: No headache, paresthesias  PSYCHIATRIC: Denies suicidal, homicidal ideations. No auditory, visual, tactile hallucinations

## 2022-12-15 NOTE — H&P ADULT - NSHPLABSRESULTS_GEN_ALL_CORE
< end of copied text > LABS:    CBC:                       10.2   6.21  )-----------( 217      ( 15 Dec 2022 18:20 )             29.1     CMP: 12-15    134<L>  |  100  |  95<H>  ----------------------------<  72  6.4<HH>   |  11<L>  |  5.65<H>    Ca    9.2      15 Dec 2022 18:20    TPro  5.8<L>  /  Alb  2.8<L>  /  TBili  32.7<H>  /  DBili  x   /  AST  132<H>  /  ALT  36<H>  /  AlkPhos  516<H>  12-15    LIVER FUNCTIONS - ( 15 Dec 2022 18:20 )  Alb: 2.8 g/dL / Pro: 5.8 g/dL / ALK PHOS: 516 U/L / ALT: 36 U/L / AST: 132 U/L / GGT: x           COAGS: PT/INR - ( 15 Dec 2022 18:20 )   PT: 27.7 sec;   INR: 2.37 ratio         PTT - ( 15 Dec 2022 18:20 )  PTT:43.6 sec  UA: Urinalysis Basic - ( 15 Dec 2022 09:30 )    Color: Katina / Appearance: Clear / S.015 / pH: x  Gluc: x / Ketone: Trace  / Bili: Large / Urobili: 4 mg/dL   Blood: x / Protein: 100 mg/dL / Nitrite: Positive   Leuk Esterase: Moderate / RBC: 25-50 /HPF / WBC 11-25   Sq Epi: x / Non Sq Epi: Moderate / Bacteria: Moderate      ABG:   POCT Glucose 92 (22:42), 69 (18:21)      < from: CT Abdomen and Pelvis No Cont (15. @ 05:11) >    IMPRESSION:  1. Limited study secondary to motion artifact and absence of contrast.  2. Abnormal appearing gallbladder, baldomero hepatis, with marked intrahepatic  ductal dilatation and multiple lesions in the liver.  3. Thickening of the distal stomach, proximal duodenum, and enlargement   of the  pancreatic head and uncinate process. 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 Concern for   portal venous thrombosis and resultant hyperdense portal veins. Further   evaluation  with contrast enhanced MRI and MRCP examination is recommended.  4. Enlarged bilateral adrenal glands.  5. Small ascites.  6. Multiple other findings as described in detail above.    < end of copied text >

## 2022-12-15 NOTE — CONSULT NOTE ADULT - ATTENDING COMMENTS
Pt. with DEMARCO, hyperkalemia and metabolic acidosis, received HD overnight. Pt. seen and examined in MICU earlier today (12/16/22). Pt. awake, resting, unable to provide history or ROS. Pt. with hypotension, BP being maintained on IV vasopressor therapy. Labs from today (12/16/22) reviewed. Hyperkalemia resolved. SCO2 improved to 19 today. No plan for HD today. Will reassess need for HD daily. Monitor labs and urine output. Avoid any potential nephrotoxins. Dose medications as per eGFR. Overall prognosis guarded.

## 2022-12-15 NOTE — H&P ADULT - NSHPPHYSICALEXAM_GEN_ALL_CORE
VITAL SIGNS:  T(C): 36.3 (12-15-22 @ 14:29), Max: 36.8 (12-15-22 @ 02:54)  T(F): 97.3 (12-15-22 @ 14:29), Max: 98.2 (12-15-22 @ 02:54)  HR: 82 (12-15-22 @ 14:29) (82 - 93)  BP: 100/60 (12-15-22 @ 14:29) (100/60 - 129/69)  BP(mean): --  RR: 16 (12-15-22 @ 14:29) (16 - 20)  SpO2: 97% (12-15-22 @ 14:29) (95% - 100%)  Wt(kg): --    PHYSICAL EXAM: **update    General: Reclining in bed in no acute distress.  Head: Normocephalic, atraumatic.   Eyes: Extraocular movements grossly intact. Anicteric sclera.  ENT: No nasal discharge. No oropharyngeal erythema.   Neuro: Alert and oriented. No facial asymmetry or dysarthria. Moving all extremities.  CV: Regular rate and rhythm. S1/S2. No murmurs appreciated.  Extremities: No peripheral edema, all limbs WWP.  Respiratory: Lung fields clear bilaterally. No crackles or wheezes appreciated.  Abdomen: Soft; nontender to palpation, all quadrants; nondistended. No guarding.  : Suprapubic region nontender. No CVA tenderness b/l.  Skin: No rashes or bruising of face, back, or forearms/calves bilaterally.  Musculoskeletal: No joint swelling, tenderness or erythema.  Psych: Mood and affect appropriate. VITAL SIGNS:  T(C): 36.3 (12-15-22 @ 14:29), Max: 36.8 (12-15-22 @ 02:54)  T(F): 97.3 (12-15-22 @ 14:29), Max: 98.2 (12-15-22 @ 02:54)  HR: 82 (12-15-22 @ 14:29) (82 - 93)  BP: 100/60 (12-15-22 @ 14:29) (100/60 - 129/69)  BP(mean): --  RR: 16 (12-15-22 @ 14:29) (16 - 20)  SpO2: 97% (12-15-22 @ 14:29) (95% - 100%)  Wt(kg): --    PHYSICAL EXAM: **update    General: Reclining in bed in no acute distress. Ill appearing   Head: Normocephalic, atraumatic.   Eyes: Extraocular movements grossly intact. Scleral icterus.  ENT: No nasal discharge. No oropharyngeal erythema.   Neuro: Alert and oriented x 3. No facial asymmetry or dysarthria. Moving all extremities. No asterixis  CV: Regular rate and rhythm. S1/S2. No murmurs appreciated.  Respiratory: Lung fields clear bilaterally. No crackles or wheezes appreciated.  Extremities: No peripheral edema, all limbs WWP.  Abdomen: Soft; nontender to palpation, all quadrants; nondistended. No guarding.  : Suprapubic region nontender. No CVA tenderness b/l.  Skin: No rashes or bruising of face, back, or forearms/calves bilaterally. No jaundice. No petechial rash. No hematoma/bruising.   Musculoskeletal: No joint swelling, tenderness or erythema.  Psych: Mood and affect appropriate. VITAL SIGNS:  T(C): 36.3 (12-15-22 @ 14:29), Max: 36.8 (12-15-22 @ 02:54)  T(F): 97.3 (12-15-22 @ 14:29), Max: 98.2 (12-15-22 @ 02:54)  HR: 82 (12-15-22 @ 14:29) (82 - 93)  BP: 100/60 (12-15-22 @ 14:29) (100/60 - 129/69)  BP(mean): --  RR: 16 (12-15-22 @ 14:29) (16 - 20)  SpO2: 97% (12-15-22 @ 14:29) (95% - 100%)  Wt(kg): --    PHYSICAL EXAM:   General: Reclining in bed, Ill appearing   Head: Normocephalic, atraumatic.   Eyes: Extraocular movements grossly intact. Scleral icterus.  ENT: No nasal discharge. No oropharyngeal erythema.   Neuro: Alert and oriented x 3. No facial asymmetry or dysarthria. Moving all extremities. No asterixis  CV: Regular rate and rhythm. S1/S2. No murmurs appreciated.  Respiratory: Lung fields clear bilaterally. No crackles or wheezes appreciated.  Extremities: No peripheral edema, all limbs WWP.  Abdomen: Soft; nontender to palpation, all quadrants; nondistended. No guarding.  : Suprapubic region nontender. No CVA tenderness b/l.  Skin: No rashes or bruising of face, back, or forearms/calves bilaterally. No jaundice. No petechial rash. No hematoma/bruising.   Musculoskeletal: No joint swelling, tenderness or erythema.  Psych: Mood and affect appropriate.

## 2022-12-15 NOTE — ED PROVIDER NOTE - OBJECTIVE STATEMENT
Pertinent PMH/PSH/FHx/SHx and Review of Systems contained within:  Patient presents to the ED for weakness, vomiting, and failure to thrive.  Patient with sister and daughter at bedside, has been weak for the last 2 days, poor appetite, when she does eat she vomits everything up.  She denies any abdominal pain.  Patient is very jaundiced, per family this started 2 weeks ago.  Patient has history of uterine cancer s/p hysterectomy and chemo/rad, was in remission as of 4 months ago.  No fevers or diarrhea.  Patient denies chest pain or dyspnea.  Patient denies EtOH/tobacco/illicit substance use.    ROS: No fever/chills, No headache/photophobia/eye pain/changes in vision, No ear pain/sore throat/dysphagia, No chest pain/palpitations, no SOB/cough/wheeze/stridor, No abdominal pain, No D/melena, no dysuria/frequency/discharge, No neck/back pain, no rash, no changes in neurological status/function. Pertinent PMH/PSH/FHx/SHx and Review of Systems contained within:  Patient presents to the ED for weakness, vomiting, and failure to thrive.  Patient with sister and daughter at bedside, has been weak for the last 2 days, poor appetite, when she does eat she vomits everything up.  She denies any abdominal pain.  Patient is very jaundiced, per family this started 2 weeks ago.  Patient has history of uterine cancer s/p hysterectomy and chemo/rad, was in remission as of 4 months ago, was treated at [place] (?).  No fevers or diarrhea.  Patient denies chest pain or dyspnea.  Patient denies EtOH/tobacco/illicit substance use.    ROS: No fever/chills, No headache/photophobia/eye pain/changes in vision, No ear pain/sore throat/dysphagia, No chest pain/palpitations, no SOB/cough/wheeze/stridor, No abdominal pain, No D/melena, no dysuria/frequency/discharge, No neck/back pain, no rash, no changes in neurological status/function. Pertinent PMH/PSH/FHx/SHx and Review of Systems contained within:  Patient presents to the ED for weakness, vomiting, and failure to thrive.  Patient with sister and daughter at bedside, has been weak for the last 2 days, poor appetite, when she does eat she vomits everything up.  She denies any abdominal pain.  Patient is very jaundiced, per family this started 2 weeks ago.  Patient has history of uterine cancer s/p hysterectomy and chemo/rad, was in remission as of 4 months ago, was treated at [facility] (?).  No fevers or diarrhea.  Patient denies chest pain or dyspnea.  She is having mild BL epistaxis, denies trauma, says that she does not take blood thinners.  Patient denies EtOH/tobacco/illicit substance use.    ROS: No fever/chills, No headache/photophobia/eye pain/changes in vision, No ear pain/sore throat/dysphagia, No chest pain/palpitations, no SOB/cough/wheeze/stridor, No abdominal pain, No D/melena, no dysuria/frequency/discharge, No neck/back pain, no rash, no changes in neurological status/function.

## 2022-12-15 NOTE — H&P ADULT - ASSESSMENT
HRS  - No NSAIDs  - if needed, Tylenol <2g        Full code. Daughter is emergency health decision maker. 12/15.     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 Daughter is emergency health decision maker. 12/15. 67 y.o. 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 Daughter is emergency health decision maker. 12/15. 67 y.o. 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  - hyperbilirubinemia  - consider ERCP  - appreciate surg onc, IR, GI recs  - if needed, Tylenol <2g  - consider ABX to cover for potential development of cholangitis    DEMARCO  - CT 12/15 portal HTN  - no NSAIDs  - Valle: monitor UO  - urine lytes 12/15      - if prerenal hepatorenal syndrome - consider albumin, octreotide, midodrine  - metabolic acidosis  - appreciate nephrology recs    - UA 12/15 possibly contaminated - repeat  - MRSA/MSSA swab 12/15    DVT PPX: hold chemo PPX iso uremic platelet dysfunction, recent epistaxis and vaginal bleeding  Diet: NPO except meds/sips/ice chips pre-procedure  PCP: Yancy Rodrigues  Full code. Daughter is emergency health decision maker. 12/15. 68 YO F with history of uterine cancer (s/p hysterectomy, finished chemo 4 months ago) presenting with 3 weeks of painless jaundice, imaging concerning for malignant biliary obstruction.     #Neuro  A&Ox4  - No active issues    #CV  - Blood pressure 100/60, not on pressors  - No active issues    #PULMONARY   CT with consolidation in lingula and RML - atelectasis vs. pneumonia  - Saturating well on room air    #INFECTIOUS DISEASE   - Afebrile, no leukocytosis, RVP negative however consolidation noted in lingula/RML atelectasis vs. 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   - Urgent HD 12/15  - Obtain urine protein, urine creatinine, and urine sodium  - Obtain renal US  - Strict I&O's     Hyperkalemia  - 5.8 on admission, for which she got IV insulin/D50, Lokelma, and calcium gluconate.   - Potassium has not improved despite these measures (6.4, mild hemolysis)  - Monitor post HD    Anion gap metabolic acidosis  pH 7.27, anion gap 23, bicarb 11  - HD as above    UA with moderate bacteria, moderate epithelial cells   - Likely contaminated, obtain repeat    #Endocrine   Enlarged adrenal glands  - AM cortisol    #Hematology   - Anemia to 11.8 -> 10.2   - Noted to have vaginal bleeding, recent epistaxis  - GYN consult in AM    INR 2.37 likely 2/2 uremic platelet dysfunction  - Monitor coags    #DVT AND GI PROPHYLAXIS  Hold chemo PPX iso uremic platelet dysfunction  SCDs    #Code Status: Full code. Daughter is NOK per patient

## 2022-12-15 NOTE — ED ADULT NURSE NOTE - CHIEF COMPLAINT QUOTE
Transfer from [facility] for ERCP, ct showed cholangiocarcinoma, noted to be jaundice, NS infusing from EMS, hx of metastatic uterine CA s/p radiation

## 2022-12-15 NOTE — H&P ADULT - ATTENDING COMMENTS
67 year-old F with PMH uterine cancer who presented to an outside hospital with painless jaundice, transferred to [facility] for evaluation by gastroenterology/surgery. The patient was found to have evidence of metastatic cholangiocarcinoma on CT A/P. The patient was also found to be in acute renal failure with hyperkalemia and acidemia refractory to medical therapy. Nephrology consulted and patient is planned for urgent HD this evening. Will place HD catheter. Bedside ultrasound showed small ascites without safe pocket for diagnostic paracentesis to rule out an infectious source. Will treat with Zosyn for now pending cultures. Overall guarded prognosis, patient currently full code after discussion with her and family. Appreciated surgery input. Follow up with GI in the AM for further diagnostic workup, possible biopsy.    Aguilar Le MD  Pulmonary and Critical Care Medicine

## 2022-12-16 NOTE — CONSULT NOTE ADULT - SUBJECTIVE AND OBJECTIVE BOX
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   Patient is hemodynamically stable. Labs on presentation notable for elevated hyperK, acute renal failure. Pt s/p urgent HD in MICU. Also with Tbili 41.1, , , ALT 47, lipase 1431. No leukocytosis.  CT A/P noncon showing abnormal appearing gallbladder, baldomero hepatis, with marked intrahepatic ductal dilatation and multiple lesions in the liver. Thickening of the distal stomach, proximal duodenum, and enlargement of the pancreatic head and uncinate process. Findings are concerning for cholangiocarcinoma with metastatic disease to the liver. Concern for portal venous thrombosis and resultant hyperdense portal veins.     Allergies:  No Known Allergies    Home Medications:    Hospital Medications:  chlorhexidine 2% Cloths 1 Application(s) Topical <User Schedule>  dextrose 50% Injectable 25 Gram(s) IV Push once  dextrose 50% Injectable 12.5 Gram(s) IV Push once  dextrose 50% Injectable 25 Gram(s) IV Push once  dextrose Oral Gel 15 Gram(s) Oral once  glucagon  Injectable 1 milliGRAM(s) IntraMuscular once  lactated ringers. 1000 milliLiter(s) IV Continuous <Continuous>  norepinephrine Infusion 0.05 MICROgram(s)/kG/Min IV Continuous <Continuous>  piperacillin/tazobactam IVPB.. 3.375 Gram(s) IV Intermittent every 12 hours    PMHX/PSHX:  Uterine cancer    S/P hysterectomy    Family history:      Denies family history of colon cancer/polyps, stomach cancer/polyps, pancreatic cancer/masses, liver cancer/disease, ovarian cancer and endometrial cancer.    Social History:   Tob: Denies  EtOH: Denies  Illicit Drugs: Denies    ROS:   General:  No wt loss, fevers, chills, night sweats, fatigue  Eyes:  Good vision, no reported pain  ENT:  No sore throat, pain, runny nose, dysphagia  CV:  No pain, palpitations, hypo/hypertension  Pulm:  No dyspnea, cough, tachypnea, wheezing  GI:  see HPI  :  No pain, bleeding, incontinence, nocturia  Muscle:  No pain, weakness  Neuro:  No weakness, tingling, memory problems  Psych:  No fatigue, insomnia, mood problems, depression  Endocrine:  No polyuria, polydipsia, cold/heat intolerance  Heme:  No petechiae, ecchymosis, easy bruisability  Skin:  No rash, tattoos, scars, edema    PHYSICAL EXAM:   GENERAL:  No acute distress  HEENT:  NCAT, no scleral icterus   CHEST:  no respiratory distress  HEART:  Regular rate and rhythm  ABDOMEN:  Soft, non-tender, non-distended, normoactive bowel sounds,  no masses  EXTREMITIES: No edema  SKIN:  No rash/erythema/ecchymoses/petechiae/wounds/abscess/warm/dry  NEURO:  Alert and oriented x 3, no asterixis    Vital Signs:  Vital Signs Last 24 Hrs  T(C): 36.5 (16 Dec 2022 08:00), Max: 36.8 (15 Dec 2022 13:28)  T(F): 97.7 (16 Dec 2022 08:00), Max: 98.2 (15 Dec 2022 13:28)  HR: 100 (16 Dec 2022 09:00) (78 - 104)  BP: 88/46 (16 Dec 2022 09:00) (48/32 - 111/59)  BP(mean): 56 (16 Dec 2022 09:00) (35 - 70)  RR: 29 (16 Dec 2022 09:00) (16 - 40)  SpO2: 95% (16 Dec 2022 09:00) (93% - 98%)    Parameters below as of 16 Dec 2022 09:00  Patient On (Oxygen Delivery Method): room air      Daily     Daily Weight in k.9 (16 Dec 2022 02:00)    LABS:                        10.1   6.12  )-----------( 191      ( 16 Dec 2022 00:55 )             28.0     Mean Cell Volume: 81.4 fL (16-22 @ 00:55)        135  |  98  |  66<H>  ----------------------------<  78  3.9   |  19<L>  |  3.81<H>    Ca    9.4      16 Dec 2022 00:55    TPro  5.9<L>  /  Alb  2.8<L>  /  TBili  33.1<H>  /  DBili  x   /  AST  126<H>  /  ALT  41<H>  /  AlkPhos  535<H>  12-16    LIVER FUNCTIONS - ( 16 Dec 2022 00:55 )  Alb: 2.8 g/dL / Pro: 5.9 g/dL / ALK PHOS: 535 U/L / ALT: 41 U/L / AST: 126 U/L / GGT: x           PT/INR - ( 15 Dec 2022 18:20 )   PT: 27.7 sec;   INR: 2.37 ratio         PTT - ( 16 Dec 2022 00:55 )  PTT:44.4 sec  Urinalysis Basic - ( 15 Dec 2022 20:30 )    Color: Katina / Appearance: Slightly Turbid / S.014 / pH: x  Gluc: x / Ketone: Negative  / Bili: Large / Urobili: <2 mg/dL   Blood: x / Protein: 30 mg/dL / Nitrite: Negative   Leuk Esterase: Large / RBC: 10 /HPF / WBC 10 /HPF   Sq Epi: x / Non Sq Epi: 5 /HPF / Bacteria: Many                              10.1   6.12  )-----------( 191      ( 16 Dec 2022 00:55 )             28.0                         10.0   6.20  )-----------( 249      ( 15 Dec 2022 20:48 )             28.4                         10.2   6.21  )-----------( 217      ( 15 Dec 2022 18:20 )             29.1                         11.8   7.05  )-----------( 237      ( 15 Dec 2022 01:30 )             32.8       Imaging:  ACC: [id number] EXAM:  CT ABDOMEN AND PELVIS                          PROCEDURE DATE:  12/15/2022      INTERPRETATION:  PROCEDURE INFORMATION:  Exam: CT Abdomen And Pelvis Without Contrast  Exam date and time: 12/15/2022 3:15 AM  Age: 67 years old  Clinical indication: Jaundice, vomting    TECHNIQUE:  Imaging protocol: Computed tomography of the abdomen and pelvis without  contrast.    COMPARISON: None available.    FINDINGS:  Lungs: Consolidation in the lingula and right middle lobe with air   bronchograms  may represent atelectasis versus pneumonia. Increased interstitial   markings and  areas of scarring/atelectasis in bilateral lower lobes.  Pleural spaces: Trace bilateral pleural effusions.  Heart: Cardiomegaly.  Mediastinal space:Hyperdensity in the esophagus and stomach likely   ingested  material.    Liver: Lesion in the right lobe of the liver measuring 23 x 22 x 22 mm.  Multiple other lesions are seen in the left lobe of the liver, largest   one measuring 29 x 34 x 27.3 mm.  Findings are concerning for metastatic   disease. There are linear hyperdensities along the dilated intrahepatic   bile ducts. This could be related to hyperdense thrombosed portal veins.  Gallbladder and bile ducts: Moderate intrahepatic ductal dilatation.  Low-attenuation area in the baldomero hepatis measuring 27.5 x 24.4 mm.   Abnormal  appearing gallbladder with hyperdensity within it and moderate surrounding  edema. No calcified stones are seen.  Pancreas: Enlarged pancreatic head and uncinate process. Mild dilatation   of the  pancreatic duct in the distal body and tail of the pancreas.  Spleen: Normal. No splenomegaly.  Adrenal glands: Abnormal appearing enlarged bilateral adrenal glands,   right  measuring 30.6 x 19.4 mm and left measuring 29 x 24.2 mm.  Kidneys and ureters: Simple cyst measuring 17 mm in the upper pole of the   right  kidney. Left kidney is unremarkable.  Stomach and bowel: Thickening of the mid to distal stomach and 1st part   of the  duodenum with surrounding inflammation. No bowel obstruction. Limited   evaluation of the colon due to collapsed state.  Appendix: Appendix is not seen.    Intraperitoneal space: Small ascites. Mesenteric edema.  Vasculature: Mild atherosclerosis.  Lymph nodes: Multiple subcentimeter small retroperitoneal lymph nodes and  possibly mesenteric lymph nodes difficult to evaluate secondary to   absence of enteric contrast.  Urinary bladder: Urinary bladder is not well distended otherwise   unremarkable.  Reproductive: Status post hysterectomy.  Bones/joints: Demineralization of the bones with degenerative changes.  Soft tissues: Small left paraumbilical fat containing hernia.    IMPRESSION:  1. Limited study secondary to motion artifact and absence of contrast.  2. Abnormal appearing gallbladder, baldomero hepatis, with marked intrahepatic  ductal dilatation and multiple lesions in the liver.  3. Thickening of the distal stomach, proximal duodenum, and enlargement   of the  pancreatic head and uncinate process. 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 Concern for   portal venous thrombosis and resultant hyperdense portal veins. Further   evaluation  with contrast enhanced MRI and MRCP examination is recommended.  4. Enlarged bilateral adrenal glands.  5. Small ascites.  6. Multiple other findings as described in detail above.    --- End of Report ---      GURBHUSHAN KOLBY MD; Attending Radiologist  This document has been electronically signed. 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   Patient is hemodynamically stable. Labs on presentation notable for elevated hyperK, acute renal failure. Pt s/p urgent HD in MICU. Also with Tbili 41.1, , , ALT 47, lipase 1431. No leukocytosis.  CT A/P noncon showing abnormal appearing gallbladder, baldomero hepatis, with marked intrahepatic ductal dilatation and multiple lesions in the liver. Thickening of the distal stomach, proximal duodenum, and enlargement of the pancreatic head and uncinate process. Findings are concerning for cholangiocarcinoma with metastatic disease to the liver. Concern for portal venous thrombosis and resultant hyperdense portal veins.     Allergies:  No Known Allergies    Home Medications:    Hospital Medications:  chlorhexidine 2% Cloths 1 Application(s) Topical <User Schedule>  dextrose 50% Injectable 25 Gram(s) IV Push once  dextrose 50% Injectable 12.5 Gram(s) IV Push once  dextrose 50% Injectable 25 Gram(s) IV Push once  dextrose Oral Gel 15 Gram(s) Oral once  glucagon  Injectable 1 milliGRAM(s) IntraMuscular once  lactated ringers. 1000 milliLiter(s) IV Continuous <Continuous>  norepinephrine Infusion 0.05 MICROgram(s)/kG/Min IV Continuous <Continuous>  piperacillin/tazobactam IVPB.. 3.375 Gram(s) IV Intermittent every 12 hours    PMHX/PSHX:  Uterine cancer    S/P hysterectomy    Family history:      Denies family history of colon cancer/polyps, stomach cancer/polyps, pancreatic cancer/masses, liver cancer/disease, ovarian cancer and endometrial cancer.    Social History:   Tob: Denies  EtOH: Denies  Illicit Drugs: Denies    ROS:   General:  No wt loss, fevers, chills, night sweats, fatigue  Eyes:  Good vision, no reported pain  ENT:  No sore throat, pain, runny nose, dysphagia  CV:  No pain, palpitations, hypo/hypertension  Pulm:  No dyspnea, cough, tachypnea, wheezing  GI:  see HPI  :  No pain, bleeding, incontinence, nocturia  Muscle:  No pain, weakness  Neuro:  No weakness, tingling, memory problems  Psych:  No fatigue, insomnia, mood problems, depression  Endocrine:  No polyuria, polydipsia, cold/heat intolerance  Heme:  No petechiae, ecchymosis, easy bruisability  Skin:  No rash, tattoos, scars, edema    PHYSICAL EXAM:   GENERAL:  No acute distress  HEENT:  NCAT, + scleral icterus   CHEST:  no respiratory distress  HEART:  Regular rate and rhythm  ABDOMEN:  Soft, non-tender, non-distended   EXTREMITIES: No edema  SKIN:  No rash/erythema/ecchymoses. +jaundice  NEURO:  Alert and oriented x 3     Vital Signs:  Vital Signs Last 24 Hrs  T(C): 36.5 (16 Dec 2022 08:00), Max: 36.8 (15 Dec 2022 13:28)  T(F): 97.7 (16 Dec 2022 08:00), Max: 98.2 (15 Dec 2022 13:28)  HR: 100 (16 Dec 2022 09:00) (78 - 104)  BP: 88/46 (16 Dec 2022 09:00) (48/32 - 111/59)  BP(mean): 56 (16 Dec 2022 09:00) (35 - 70)  RR: 29 (16 Dec 2022 09:00) (16 - 40)  SpO2: 95% (16 Dec 2022 09:00) (93% - 98%)    Parameters below as of 16 Dec 2022 09:00  Patient On (Oxygen Delivery Method): room air      Daily     Daily Weight in k.9 (16 Dec 2022 02:00)    LABS:                        10.1   6.12  )-----------( 191      ( 16 Dec 2022 00:55 )             28.0     Mean Cell Volume: 81.4 fL (12-16-22 @ 00:55)    12    135  |  98  |  66<H>  ----------------------------<  78  3.9   |  19<L>  |  3.81<H>    Ca    9.4      16 Dec 2022 00:55    TPro  5.9<L>  /  Alb  2.8<L>  /  TBili  33.1<H>  /  DBili  x   /  AST  126<H>  /  ALT  41<H>  /  AlkPhos  535<H>  12-16    LIVER FUNCTIONS - ( 16 Dec 2022 00:55 )  Alb: 2.8 g/dL / Pro: 5.9 g/dL / ALK PHOS: 535 U/L / ALT: 41 U/L / AST: 126 U/L / GGT: x           PT/INR - ( 15 Dec 2022 18:20 )   PT: 27.7 sec;   INR: 2.37 ratio         PTT - ( 16 Dec 2022 00:55 )  PTT:44.4 sec  Urinalysis Basic - ( 15 Dec 2022 20:30 )    Color: Katina / Appearance: Slightly Turbid / S.014 / pH: x  Gluc: x / Ketone: Negative  / Bili: Large / Urobili: <2 mg/dL   Blood: x / Protein: 30 mg/dL / Nitrite: Negative   Leuk Esterase: Large / RBC: 10 /HPF / WBC 10 /HPF   Sq Epi: x / Non Sq Epi: 5 /HPF / Bacteria: Many                              10.1   6.12  )-----------( 191      ( 16 Dec 2022 00:55 )             28.0                         10.0   6.20  )-----------( 249      ( 15 Dec 2022 20:48 )             28.4                         10.2   6.21  )-----------( 217      ( 15 Dec 2022 18:20 )             29.1                         11.8   7.05  )-----------( 237      ( 15 Dec 2022 01:30 )             32.8       Imaging:  ACC: [id number] EXAM:  CT ABDOMEN AND PELVIS                          PROCEDURE DATE:  12/15/2022      INTERPRETATION:  PROCEDURE INFORMATION:  Exam: CT Abdomen And Pelvis Without Contrast  Exam date and time: 12/15/2022 3:15 AM  Age: 67 years old  Clinical indication: Jaundice, vomting    TECHNIQUE:  Imaging protocol: Computed tomography of the abdomen and pelvis without  contrast.    COMPARISON: None available.    FINDINGS:  Lungs: Consolidation in the lingula and right middle lobe with air   bronchograms  may represent atelectasis versus pneumonia. Increased interstitial   markings and  areas of scarring/atelectasis in bilateral lower lobes.  Pleural spaces: Trace bilateral pleural effusions.  Heart: Cardiomegaly.  Mediastinal space:Hyperdensity in the esophagus and stomach likely   ingested  material.    Liver: Lesion in the right lobe of the liver measuring 23 x 22 x 22 mm.  Multiple other lesions are seen in the left lobe of the liver, largest   one measuring 29 x 34 x 27.3 mm.  Findings are concerning for metastatic   disease. There are linear hyperdensities along the dilated intrahepatic   bile ducts. This could be related to hyperdense thrombosed portal veins.  Gallbladder and bile ducts: Moderate intrahepatic ductal dilatation.  Low-attenuation area in the baldomero hepatis measuring 27.5 x 24.4 mm.   Abnormal  appearing gallbladder with hyperdensity within it and moderate surrounding  edema. No calcified stones are seen.  Pancreas: Enlarged pancreatic head and uncinate process. Mild dilatation   of the  pancreatic duct in the distal body and tail of the pancreas.  Spleen: Normal. No splenomegaly.  Adrenal glands: Abnormal appearing enlarged bilateral adrenal glands,   right  measuring 30.6 x 19.4 mm and left measuring 29 x 24.2 mm.  Kidneys and ureters: Simple cyst measuring 17 mm in the upper pole of the   right  kidney. Left kidney is unremarkable.  Stomach and bowel: Thickening of the mid to distal stomach and 1st part   of the  duodenum with surrounding inflammation. No bowel obstruction. Limited   evaluation of the colon due to collapsed state.  Appendix: Appendix is not seen.    Intraperitoneal space: Small ascites. Mesenteric edema.  Vasculature: Mild atherosclerosis.  Lymph nodes: Multiple subcentimeter small retroperitoneal lymph nodes and  possibly mesenteric lymph nodes difficult to evaluate secondary to   absence of enteric contrast.  Urinary bladder: Urinary bladder is not well distended otherwise   unremarkable.  Reproductive: Status post hysterectomy.  Bones/joints: Demineralization of the bones with degenerative changes.  Soft tissues: Small left paraumbilical fat containing hernia.    IMPRESSION:  1. Limited study secondary to motion artifact and absence of contrast.  2. Abnormal appearing gallbladder, baldomero hepatis, with marked intrahepatic  ductal dilatation and multiple lesions in the liver.  3. Thickening of the distal stomach, proximal duodenum, and enlargement   of the  pancreatic head and uncinate process. 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 Concern for   portal venous thrombosis and resultant hyperdense portal veins. Further   evaluation  with contrast enhanced MRI and MRCP examination is recommended.  4. Enlarged bilateral adrenal glands.  5. Small ascites.  6. Multiple other findings as described in detail above.    --- End of Report ---      TYRONE JARRETT MD; Attending Radiologist  This document has been electronically signed. Dec 15 2022  9:03AM

## 2022-12-16 NOTE — CHART NOTE - NSCHARTNOTEFT_GEN_A_CORE
: Sondra Sawyer    INDICATION: critical illness    PROCEDURE:  [x] LIMITED ECHO  [x] LIMITED CHEST  [ ] LIMITED RETROPERITONEAL  [x] LIMITED ABDOMINAL  [ ] LIMITED DVT  [ ] NEEDLE GUIDANCE VASCULAR  [ ] NEEDLE GUIDANCE THORACENTESIS  [ ] NEEDLE GUIDANCE PARACENTESIS  [ ] NEEDLE GUIDANCE PERICARDIOCENTESIS  [ ] OTHER    FINDINGS:  scattered B lines in anterior lung fields R > L  LVSF normal  RV < LV  IVC 1.4 cm  trace ascites    INTERPRETATION:  scattered B lines  normal cardiac function  trace ascites, not amenable to paracentesis     Images uploaded on [employer]

## 2022-12-16 NOTE — PROVIDER CONTACT NOTE (HYPOGLYCEMIA EVENT) - NS PROVIDER CONTACT BACKGROUND-HYPO
Age: 67y    Gender: Female    POCT Blood Glucose:  69 mg/dL (12-16-22 @ 04:30)  60 mg/dL (12-16-22 @ 04:29)  76 mg/dL (12-16-22 @ 00:25)  80 mg/dL (12-15-22 @ 20:32)  69 mg/dL (12-15-22 @ 18:21)      eMAR:  desmopressin IVPB   230 mL/Hr IV Intermittent (12-15-22 @ 21:55)    dextrose 50% Injectable   50 milliLiter(s) IV Push (12-15-22 @ 18:30)    insulin regular  human recombinant   5 Unit(s) IV Push (12-15-22 @ 18:33)     4oz Apple Juice given per provider order: 4:38 AM 
Age: 67y    Gender: Female    POCT Blood Glucose:  155 mg/dL (12-16-22 @ 05:21)  64 mg/dL (12-16-22 @ 04:52)  63 mg/dL (12-16-22 @ 04:51)  69 mg/dL (12-16-22 @ 04:30)  60 mg/dL (12-16-22 @ 04:29)  76 mg/dL (12-16-22 @ 00:25)  80 mg/dL (12-15-22 @ 20:32)  69 mg/dL (12-15-22 @ 18:21)      eMAR:  desmopressin IVPB   230 mL/Hr IV Intermittent (12-15-22 @ 21:55)    dextrose 50% Injectable   50 milliLiter(s) IV Push (12-15-22 @ 18:30)    dextrose 50% Injectable   25 milliLiter(s) IV Push (12-16-22 @ 05:04)    insulin regular  human recombinant   5 Unit(s) IV Push (12-15-22 @ 18:33)

## 2022-12-16 NOTE — CONSULT NOTE ADULT - SUBJECTIVE AND OBJECTIVE BOX
Interventional Radiology    HPI: 67y Female with PMH uterine ca (s/p hysterectomy, chemo/RT, in remission as of 4 months ago) presenting with 2 weeks painless jaundice and progressive failure to thrive found to have irregular appearing gallbladder with intrahepatic biliary ductal dilation and multiple liver lesions concerning for metastatic cholangiocarcinoma.    Allergies:   Medications (Abx/Cardiac/Anticoagulation/Blood Products)    norepinephrine Infusion: 7.16 mL/Hr IV Continuous (12-16 @ 00:13)  piperacillin/tazobactam IVPB.-: 25 mL/Hr IV Intermittent (12-16 @ 05:00)  piperacillin/tazobactam IVPB...: 200 mL/Hr IV Intermittent (12-15 @ 18:59)    Data:    76.4  T(C): 36.5  HR: 100  BP: 88/46  RR: 29  SpO2: 95%    -WBC 6.12 / HgB 10.1 / Hct 28.0 / Plt 191  -Na 135 / Cl 98 / BUN 66 / Glucose 78  -K 3.9 / CO2 19 / Cr 3.81  -ALT 41 / Alk Phos 535 / T.Bili 33.1  -INR -- / PTT 44.4      Imaging: [email or web address].     -----------------------------------------------------------------------------------------------------------------------------------------------------------------------    Assessment/Plan: 67y Female with PMH uterine ca (s/p hysterectomy, chemo/RT, in remission as of 4 months ago) presenting with 2 weeks painless jaundice and progressive failure to thrive found to have irregular appearing gallbladder with intrahepatic biliary ductal dilation and multiple liver lesions concerning for metastatic cholangiocarcinoma.    -- Case discussed with IR attending Dr. Halaibeh. No indication for urgent cholecystostomy tube placement. No evidence or acute cholecystitis or cholangitis.  -- Elevated bilirubin and  intrahepatic biliary ductal dilatation suspicious for obstruction, recommend GI consult for ERCP. If ERCP is not successful in relieving obstruction, IR will consider intervention.  -- Recommend MRI/MRCP with liver/pancreas protocol.  -- Re-consult as needed.    Thank You for the consultation.      Marley Mendoza D.O.  Radiology Resident (PGY-3)   Available on [employer] TEAMS (preferred)  [facility] IR Pager #[phone] Interventional Radiology    HPI: 67y Female with PMH uterine ca (s/p hysterectomy, chemo/RT, in remission as of 4 months ago) presenting with 2 weeks painless jaundice and progressive failure to thrive found to have irregular appearing gallbladder with intrahepatic biliary ductal dilation and multiple liver lesions concerning for metastatic cholangiocarcinoma.    Allergies:   Medications (Abx/Cardiac/Anticoagulation/Blood Products)    norepinephrine Infusion: 7.16 mL/Hr IV Continuous (12-16 @ 00:13)  piperacillin/tazobactam IVPB.-: 25 mL/Hr IV Intermittent (12-16 @ 05:00)  piperacillin/tazobactam IVPB...: 200 mL/Hr IV Intermittent (12-15 @ 18:59)    Data:    76.4  T(C): 36.5  HR: 100  BP: 88/46  RR: 29  SpO2: 95%    -WBC 6.12 / HgB 10.1 / Hct 28.0 / Plt 191  -Na 135 / Cl 98 / BUN 66 / Glucose 78  -K 3.9 / CO2 19 / Cr 3.81  -ALT 41 / Alk Phos 535 / T.Bili 33.1  -INR -- / PTT 44.4      Imaging: [email or web address].     -----------------------------------------------------------------------------------------------------------------------------------------------------------------------    Assessment/Plan: 67y Female with PMH uterine ca (s/p hysterectomy, chemo/RT, in remission as of 4 months ago) presenting with 2 weeks painless jaundice and progressive failure to thrive found to have irregular appearing gallbladder with intrahepatic biliary ductal dilation and multiple liver lesions concerning for metastatic cholangiocarcinoma.    -- Case discussed with IR attending Dr. Halaibeh. No indication for cholecystostomy tube placement. No evidence of acute cholecystitis  -- Elevated bilirubin and  intrahepatic biliary ductal dilatation suspicious for obstruction, recommend GI consult for ERCP. If ERCP is not successful in relieving obstruction, IR will consider intervention.  --MRI/MRCP with liver/pancreas protocol.  -- Re-consult as needed.    Thank You for the consultation.      Marley Mendoza D.O.  Radiology Resident (PGY-3)   Available on [employer] TEAMS (preferred)  [facility] IR Pager #[phone]

## 2022-12-16 NOTE — PROGRESS NOTE ADULT - SUBJECTIVE AND OBJECTIVE BOX
Significant recent/past 24 hr events:    Subjective:    Review of Systems         [ ] A ten-point review of systems was otherwise negative except as noted.  [ ] Due to altered mental status/intubation, subjective information were not able to be obtained from the patient. History was obtained, to the extent possible, from review of the chart and collateral sources of information.      Patient is a 67y old  Female who presents with a chief complaint of malignant obstruction, renal and liver failure (15 Dec 2022 19:34)    HPI:  Ms. 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     Patient was at usual state of health performing IADLs until 2 weeks prior to admission when she developed scleral icterus, pruritus, intolerance of solid food, and reduced PO intake. She endorsed a feeling of prolonged fullness with meals that would eventually on occasion prompt her to induce vomiting. Last had broth on  lunch 1 day prior to admission; had tea this morning. Also endorsed HERNANDEZ, "puffy" abdomen, light colored stools (at bedside), +dark urine. No fevers, chills, confusion, tremors, abdominal pain, sick contacts, bloody vomit/bloody stool, petechial rash/bruising/other bleeding.    Notably K 5.9 at OSH/ED, received calcium gluconate and Lokelma.    (15 Dec 2022 17:50)    PAST MEDICAL & SURGICAL HISTORY:  Uterine cancer      S/P hysterectomy        FAMILY HISTORY:      Vitals   ICU Vital Signs Last 24 Hrs  T(C): 35.4 (16 Dec 2022 04:00), Max: 36.8 (15 Dec 2022 13:28)  T(F): 95.7 (16 Dec 2022 04:00), Max: 98.2 (15 Dec 2022 13:28)  HR: 99 (16 Dec 2022 07:00) (78 - 99)  BP: 91/50 (16 Dec 2022 07:00) (48/32 - 129/69)  BP(mean): 58 (16 Dec 2022 07:00) (35 - 70)  ABP: --  ABP(mean): --  RR: 28 (16 Dec 2022 07:00) (16 - 40)  SpO2: 95% (16 Dec 2022 07:00) (93% - 98%)    O2 Parameters below as of 16 Dec 2022 07:00  Patient On (Oxygen Delivery Method): room air            Physical Exam:   Constitutional: NAD, well-groomed, well-developed  HEENT: PERRLA, EOMI, no drainage or redness  Neck: supple,  No JVD, Trachea midline  Back: Normal spine flexure, No CVA tenderness, No deformity or limitation of movement  Respiratory: Breath Sounds equal & clear bilaterally to auscultation, no accessory muscle use noted  Cardiovascular: Regular rate, regular rhythm, normal S1, S2; no murmurs or rub  Gastrointestinal: Soft, non-tender, non distended, no hepatosplenomegaly, normal bowel sounds  Extremities: KENNEDY x 4, no peripheral edema, no cyanosis, no clubbing   Vascular: Equal and normal pulses: 2+ peripheral pulses throughout  Neurological: A+O x 3; speech clear and intact; no sensory, motor  deficits, normal reflexes  Psychiatric: calm, normal mood, normal affect  Musculoskeletal: No joint swelling or deformity; no limitation of movement  Skin: warm, dry, well perfused, no rashes    VENT SETTINGS         I&O's Detail    15 Dec 2022 07:01  -  16 Dec 2022 07:00  --------------------------------------------------------  IN:    IV PiggyBack: 100 mL    Norepinephrine: 68.8 mL    Other (mL): 600 mL  Total IN: 768.8 mL    OUT:    Other (mL): 400 mL    Voided (mL): 225 mL  Total OUT: 625 mL    Total NET: 143.8 mL          LABS                        10.1   6.12  )-----------( 191      ( 16 Dec 2022 00:55 )             28.0     12-16    135  |  98  |  66<H>  ----------------------------<  78  3.9   |  19<L>  |  3.81<H>    Ca    9.4      16 Dec 2022 00:55    TPro  5.9<L>  /  Alb  2.8<L>  /  TBili  33.1<H>  /  DBili  x   /  AST  126<H>  /  ALT  41<H>  /  AlkPhos  535<H>      LIVER FUNCTIONS - ( 16 Dec 2022 00:55 )  Alb: 2.8 g/dL / Pro: 5.9 g/dL / ALK PHOS: 535 U/L / ALT: 41 U/L / AST: 126 U/L / GGT: x           PT/INR - ( 15 Dec 2022 18:20 )   PT: 27.7 sec;   INR: 2.37 ratio         PTT - ( 16 Dec 2022 00:55 )  PTT:44.4 sec        Urinalysis Basic - ( 15 Dec 2022 20:30 )    Color: Katina / Appearance: Slightly Turbid / S.014 / pH: x  Gluc: x / Ketone: Negative  / Bili: Large / Urobili: <2 mg/dL   Blood: x / Protein: 30 mg/dL / Nitrite: Negative   Leuk Esterase: Large / RBC: 10 /HPF / WBC 10 /HPF   Sq Epi: x / Non Sq Epi: 5 /HPF / Bacteria: Many      POCT Blood Glucose.: 155 mg/dL *H* (22 @ 05:21)  POCT Blood Glucose.: 64 mg/dL *L* (22 @ 04:52)  POCT Blood Glucose.: 63 mg/dL *L* (22 @ 04:51)  POCT Blood Glucose.: 69 mg/dL *L* (22 @ 04:30)  POCT Blood Glucose.: 60 mg/dL *L* (22 @ 04:29)  POCT Blood Glucose.: 76 mg/dL (22 @ 00:25)  POCT Blood Glucose.: 80 mg/dL (12-15-22 @ 20:32)  POCT Blood Glucose.: 69 mg/dL *L* (12-15-22 @ 18:21)        MEDICATIONS  (STANDING):  chlorhexidine 2% Cloths 1 Application(s) Topical <User Schedule>  dextrose 50% Injectable 25 Gram(s) IV Push once  dextrose 50% Injectable 12.5 Gram(s) IV Push once  dextrose 50% Injectable 25 Gram(s) IV Push once  dextrose Oral Gel 15 Gram(s) Oral once  glucagon  Injectable 1 milliGRAM(s) IntraMuscular once  norepinephrine Infusion 0.05 MICROgram(s)/kG/Min (7.16 mL/Hr) IV Continuous <Continuous>  piperacillin/tazobactam IVPB.. 3.375 Gram(s) IV Intermittent every 12 hours    MEDICATIONS  (PRN):      Allergies:  No Known Allergies             Significant recent/past 24 hr events: Received HD and became hypotensive; received levo. Pt also become hypoglycemic. Was given D50 and orange juice- FS wnl now.     Subjective: No acute complaints.     Review of Systems         [ ] A ten-point review of systems was otherwise negative except as noted.  [x ] Due to altered mental status/intubation, subjective information were not able to be obtained from the patient. History was obtained, to the extent possible, from review of the chart and collateral sources of information.      Patient is a 67y old  Female who presents with a chief complaint of malignant obstruction, renal and liver failure (15 Dec 2022 19:34)    HPI:  Ms. 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     Patient was at usual state of health performing IADLs until 2 weeks prior to admission when she developed scleral icterus, pruritus, intolerance of solid food, and reduced PO intake. She endorsed a feeling of prolonged fullness with meals that would eventually on occasion prompt her to induce vomiting. Last had broth on  lunch 1 day prior to admission; had tea this morning. Also endorsed HERNANDEZ, "puffy" abdomen, light colored stools (at bedside), +dark urine. No fevers, chills, confusion, tremors, abdominal pain, sick contacts, bloody vomit/bloody stool, petechial rash/bruising/other bleeding.    Notably K 5.9 at OSH/ED, received calcium gluconate and Lokelma.    (15 Dec 2022 17:50)    PAST MEDICAL & SURGICAL HISTORY:  Uterine cancer      S/P hysterectomy        FAMILY HISTORY:      Vitals   ICU Vital Signs Last 24 Hrs  T(C): 35.4 (16 Dec 2022 04:00), Max: 36.8 (15 Dec 2022 13:28)  T(F): 95.7 (16 Dec 2022 04:00), Max: 98.2 (15 Dec 2022 13:28)  HR: 99 (16 Dec 2022 07:00) (78 - 99)  BP: 91/50 (16 Dec 2022 07:00) (48/32 - 129/69)  BP(mean): 58 (16 Dec 2022 07:00) (35 - 70)  ABP: --  ABP(mean): --  RR: 28 (16 Dec 2022 07:00) (16 - 40)  SpO2: 95% (16 Dec 2022 07:00) (93% - 98%)    O2 Parameters below as of 16 Dec 2022 07:00  Patient On (Oxygen Delivery Method): room air            Physical Exam:   HEENT: PERRLA, EOMI, no drainage or redness  Neck: supple,  No JVD, Trachea midline  Respiratory: Breath Sounds equal & clear bilaterally to auscultation, no accessory muscle use noted  Cardiovascular: Regular rate, regular rhythm, normal S1, S2; no murmurs or rub  Gastrointestinal: Soft, non-tender, non distended, normal bowel sounds  Extremities: KENNEDY x 4, no peripheral edema, no cyanosis, no clubbing   Vascular: Equal and normal pulses: 2+ peripheral pulses throughout  Neurological: A+O x 3 but very lethargic; speech very soft  Musculoskeletal: No joint swelling or deformity; no limitation of movement  Skin: warm, dry, well perfused, no rashes    VENT SETTINGS         I&O's Detail    15 Dec 2022 07:01  -  16 Dec 2022 07:00  --------------------------------------------------------  IN:    IV PiggyBack: 100 mL    Norepinephrine: 68.8 mL    Other (mL): 600 mL  Total IN: 768.8 mL    OUT:    Other (mL): 400 mL    Voided (mL): 225 mL  Total OUT: 625 mL    Total NET: 143.8 mL          LABS                        10.1   6.12  )-----------( 191      ( 16 Dec 2022 00:55 )             28.0     12-16    135  |  98  |  66<H>  ----------------------------<  78  3.9   |  19<L>  |  3.81<H>    Ca    9.4      16 Dec 2022 00:55    TPro  5.9<L>  /  Alb  2.8<L>  /  TBili  33.1<H>  /  DBili  x   /  AST  126<H>  /  ALT  41<H>  /  AlkPhos  535<H>      LIVER FUNCTIONS - ( 16 Dec 2022 00:55 )  Alb: 2.8 g/dL / Pro: 5.9 g/dL / ALK PHOS: 535 U/L / ALT: 41 U/L / AST: 126 U/L / GGT: x           PT/INR - ( 15 Dec 2022 18:20 )   PT: 27.7 sec;   INR: 2.37 ratio         PTT - ( 16 Dec 2022 00:55 )  PTT:44.4 sec        Urinalysis Basic - ( 15 Dec 2022 20:30 )    Color: Katina / Appearance: Slightly Turbid / S.014 / pH: x  Gluc: x / Ketone: Negative  / Bili: Large / Urobili: <2 mg/dL   Blood: x / Protein: 30 mg/dL / Nitrite: Negative   Leuk Esterase: Large / RBC: 10 /HPF / WBC 10 /HPF   Sq Epi: x / Non Sq Epi: 5 /HPF / Bacteria: Many      POCT Blood Glucose.: 155 mg/dL *H* (22 @ 05:21)  POCT Blood Glucose.: 64 mg/dL *L* (22 @ 04:52)  POCT Blood Glucose.: 63 mg/dL *L* (22 @ 04:51)  POCT Blood Glucose.: 69 mg/dL *L* (22 @ 04:30)  POCT Blood Glucose.: 60 mg/dL *L* (22 @ 04:29)  POCT Blood Glucose.: 76 mg/dL (22 @ 00:25)  POCT Blood Glucose.: 80 mg/dL (12-15-22 @ 20:32)  POCT Blood Glucose.: 69 mg/dL *L* (12-15-22 @ 18:21)        MEDICATIONS  (STANDING):  chlorhexidine 2% Cloths 1 Application(s) Topical <User Schedule>  dextrose 50% Injectable 25 Gram(s) IV Push once  dextrose 50% Injectable 12.5 Gram(s) IV Push once  dextrose 50% Injectable 25 Gram(s) IV Push once  dextrose Oral Gel 15 Gram(s) Oral once  glucagon  Injectable 1 milliGRAM(s) IntraMuscular once  norepinephrine Infusion 0.05 MICROgram(s)/kG/Min (7.16 mL/Hr) IV Continuous <Continuous>  piperacillin/tazobactam IVPB.. 3.375 Gram(s) IV Intermittent every 12 hours    MEDICATIONS  (PRN):      Allergies:  No Known Allergies             Significant recent/past 24 hr events: Received HD and became hypotensive; received levo. Pt also become hypoglycemic. Was given D50 and orange juice- FS wnl now.     Subjective: No acute complaints.     Review of Systems         [ ] A ten-point review of systems was otherwise negative except as noted.  [x ] Due to altered mental status/intubation, subjective information were not able to be obtained from the patient. History was obtained, to the extent possible, from review of the chart and collateral sources of information.      Patient is a 67y old  Female who presents with a chief complaint of malignant obstruction, renal and liver failure (15 Dec 2022 19:34)    HPI:  Ms. 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     Patient was at usual state of health performing IADLs until 2 weeks prior to admission when she developed scleral icterus, pruritus, intolerance of solid food, and reduced PO intake. She endorsed a feeling of prolonged fullness with meals that would eventually on occasion prompt her to induce vomiting. Last had broth on  lunch 1 day prior to admission; had tea this morning. Also endorsed HERNANDEZ, "puffy" abdomen, light colored stools (at bedside), +dark urine. No fevers, chills, confusion, tremors, abdominal pain, sick contacts, bloody vomit/bloody stool, petechial rash/bruising/other bleeding.    Notably K 5.9 at OSH/ED, received calcium gluconate and Lokelma.    (15 Dec 2022 17:50)    PAST MEDICAL & SURGICAL HISTORY:  Uterine cancer      S/P hysterectomy        FAMILY HISTORY:      Vitals   ICU Vital Signs Last 24 Hrs  T(C): 35.4 (16 Dec 2022 04:00), Max: 36.8 (15 Dec 2022 13:28)  T(F): 95.7 (16 Dec 2022 04:00), Max: 98.2 (15 Dec 2022 13:28)  HR: 99 (16 Dec 2022 07:00) (78 - 99)  BP: 91/50 (16 Dec 2022 07:00) (48/32 - 129/69)  BP(mean): 58 (16 Dec 2022 07:00) (35 - 70)  ABP: --  ABP(mean): --  RR: 28 (16 Dec 2022 07:00) (16 - 40)  SpO2: 95% (16 Dec 2022 07:00) (93% - 98%)    O2 Parameters below as of 16 Dec 2022 07:00  Patient On (Oxygen Delivery Method): room air            Physical Exam:   HEENT: PERRLA, EOMI, no drainage or redness, scleral icterus   Neck: supple,  No JVD, Trachea midline  Respiratory: Breath Sounds equal & clear bilaterally to auscultation, no accessory muscle use noted  Cardiovascular: Regular rate, regular rhythm, normal S1, S2; no murmurs or rub  Gastrointestinal: Soft, non-tender, non distended, normal bowel sounds  Extremities: KENNEDY x 4, no peripheral edema, no cyanosis, no clubbing   Vascular: Equal and normal pulses: 2+ peripheral pulses throughout  Neurological: A+O x 3 but very lethargic; speech very soft  Musculoskeletal: No joint swelling or deformity; no limitation of movement  Skin: warm, dry, well perfused, no rashes    VENT SETTINGS         I&O's Detail    15 Dec 2022 07:01  -  16 Dec 2022 07:00  --------------------------------------------------------  IN:    IV PiggyBack: 100 mL    Norepinephrine: 68.8 mL    Other (mL): 600 mL  Total IN: 768.8 mL    OUT:    Other (mL): 400 mL    Voided (mL): 225 mL  Total OUT: 625 mL    Total NET: 143.8 mL          LABS                        10.1   6.12  )-----------( 191      ( 16 Dec 2022 00:55 )             28.0     12-16    135  |  98  |  66<H>  ----------------------------<  78  3.9   |  19<L>  |  3.81<H>    Ca    9.4      16 Dec 2022 00:55    TPro  5.9<L>  /  Alb  2.8<L>  /  TBili  33.1<H>  /  DBili  x   /  AST  126<H>  /  ALT  41<H>  /  AlkPhos  535<H>      LIVER FUNCTIONS - ( 16 Dec 2022 00:55 )  Alb: 2.8 g/dL / Pro: 5.9 g/dL / ALK PHOS: 535 U/L / ALT: 41 U/L / AST: 126 U/L / GGT: x           PT/INR - ( 15 Dec 2022 18:20 )   PT: 27.7 sec;   INR: 2.37 ratio         PTT - ( 16 Dec 2022 00:55 )  PTT:44.4 sec        Urinalysis Basic - ( 15 Dec 2022 20:30 )    Color: Katina / Appearance: Slightly Turbid / S.014 / pH: x  Gluc: x / Ketone: Negative  / Bili: Large / Urobili: <2 mg/dL   Blood: x / Protein: 30 mg/dL / Nitrite: Negative   Leuk Esterase: Large / RBC: 10 /HPF / WBC 10 /HPF   Sq Epi: x / Non Sq Epi: 5 /HPF / Bacteria: Many      POCT Blood Glucose.: 155 mg/dL *H* (22 @ 05:21)  POCT Blood Glucose.: 64 mg/dL *L* (22 @ 04:52)  POCT Blood Glucose.: 63 mg/dL *L* (22 @ 04:51)  POCT Blood Glucose.: 69 mg/dL *L* (22 @ 04:30)  POCT Blood Glucose.: 60 mg/dL *L* (22 @ 04:29)  POCT Blood Glucose.: 76 mg/dL (22 @ 00:25)  POCT Blood Glucose.: 80 mg/dL (12-15-22 @ 20:32)  POCT Blood Glucose.: 69 mg/dL *L* (12-15-22 @ 18:21)        MEDICATIONS  (STANDING):  chlorhexidine 2% Cloths 1 Application(s) Topical <User Schedule>  dextrose 50% Injectable 25 Gram(s) IV Push once  dextrose 50% Injectable 12.5 Gram(s) IV Push once  dextrose 50% Injectable 25 Gram(s) IV Push once  dextrose Oral Gel 15 Gram(s) Oral once  glucagon  Injectable 1 milliGRAM(s) IntraMuscular once  norepinephrine Infusion 0.05 MICROgram(s)/kG/Min (7.16 mL/Hr) IV Continuous <Continuous>  piperacillin/tazobactam IVPB.. 3.375 Gram(s) IV Intermittent every 12 hours    MEDICATIONS  (PRN):      Allergies:  No Known Allergies

## 2022-12-16 NOTE — CONSULT NOTE ADULT - ASSESSMENT
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     #Hyperbilirubinemia: Tbili 41.1 w/ elevate transaminases (, , LAT 47)  #C/f cholangiocarcinoma: CT A/P non showing abnormal gallbladder, baldomero hepatis, with marked intrahepatic ductal dilatation and multiple lesions in the liver as well as thickening of the distal stomach, proximal duodenum, and enlargement of the pancreatic head and uncinate process      Recommendations  - trend CMP  -      Pt is a 66 yo F w/ PMHx uterine ca (s/p hysterectomy, chemo/RT, in remission as of 4 months ago), BIBEMS to [place], p/w 2 weeks painless jaundice and progressive failure to thrive, 1 day epistaxis and vaginal bleeding; 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     #Uterine CA s/p hysterectomy/chemo/RT  #Hyperbilirubinemia: Tbili 41.1 w/ elevate transaminases (, , ALT 47). Concern for biliary obstruction 2/2 malignancy  #C/f cholangiocarcinoma: CT A/P non showing abnormal gallbladder, baldomero hepatis, with marked intrahepatic ductal dilatation and multiple lesions in the liver as well as thickening of the distal stomach, proximal duodenum, and enlargement of the pancreatic head and uncinate process.      Recommendations  - trend CMP  - please obtain MR/MRCP for evaluation of biliary ducts and hepatic mass  - f/u CA 19-9, CEA,   - f/u BCx    All recommendations are tentative until note is attested by attending.     Tawanna Davidson, PGY5  Gastroenterology/Hepatology Fellow  Available on [employer] Teams  [phone] ([facility] Short Range Pager)  [phone] (Long Range Pager)    After 5pm, please contact the on-call GI fellow. 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     #Uterine CA s/p hysterectomy/chemo/RT  #Hyperbilirubinemia: Tbili 41.1 w/ elevate transaminases (, , ALT 47). Concern for biliary obstruction 2/2 malignancy  #C/f cholangiocarcinoma: CT A/P non showing abnormal gallbladder, baldomero hepatis, with marked intrahepatic ductal dilatation and multiple lesions in the liver as well as thickening of the distal stomach, proximal duodenum, and enlargement of the pancreatic head and uncinate process.      Recommendations  - trend CMP  - please obtain MR/MRCP for evaluation of biliary ducts and hepatic mass  - f/u BCx    All recommendations are tentative until note is attested by attending.     Tawanna Davidson, PGY5  Gastroenterology/Hepatology Fellow  Available on Microsoft Teams  [phone] ([facility] Short Range Pager)  [phone] (Long Range Pager)    After 5pm, please contact the on-call GI fellow. [phone]

## 2022-12-16 NOTE — CONSULT NOTE ADULT - ATTENDING COMMENTS
Agree with above  67 year old woman with hx of uterine cancer (s/p hysterectomy, chemo/RT, in remission) initially presented to [facility] with 2 weeks painless jaundice and FTT. Found to have cholestatic pattern of liver enzyme elevations with hyperbilirubinemia (41.1). Found to have CT findings of thickening of distal stomach/proximal duodenum and pancreatic head as well as multiple liver lesions concerning for metastatic disease. Recommend obtain MRI/MRCP to assess intrahepatic biliary tree in preparation for ERCP and rule out portal vein thrombosis. Keep NPO @ [place] Sunday night. Trend CMP. Agree with above  67 year old woman with hx of uterine cancer (s/p hysterectomy, chemo/RT, in remission) initially presented to [facility] with 2 weeks painless jaundice and FTT. Found to have cholestatic pattern of liver enzyme elevations with hyperbilirubinemia (41.1). Found to have CT findings of thickening of distal stomach/proximal duodenum and pancreatic head as well as multiple liver lesions concerning for metastatic disease. Recommend obtain MRI/MRCP to assess intrahepatic biliary tree in preparation for ERCP and rule out portal vein thrombosis. Keep NPO @ [place] Sunday night. Trend CMP. Plan for ERCP Monday if optimized.

## 2022-12-16 NOTE — PATIENT PROFILE ADULT - FALL HARM RISK - RISK INTERVENTIONS

## 2022-12-16 NOTE — PROGRESS NOTE ADULT - ASSESSMENT
Assessment:  · Assessment	  68 YO F with history of uterine cancer (s/p hysterectomy, finished chemo 4 months ago) presenting with 3 weeks of painless jaundice, imaging concerning for malignant biliary obstruction.     #Neuro  A&Ox4  - No active issues    #CV  - Blood pressure 100/60, not on pressors  - No active issues    #PULMONARY   CT with consolidation in lingula and RML - atelectasis vs. pneumonia  - Saturating well on room air    #INFECTIOUS DISEASE   - Afebrile, no leukocytosis, RVP negative however consolidation noted in lingula/RML atelectasis vs. 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   - Urgent HD 12/15  - Obtain urine protein, urine creatinine, and urine sodium  - Obtain renal US  - Strict I&O's     Hyperkalemia  - 5.8 on admission, for which she got IV insulin/D50, Lokelma, and calcium gluconate.   - Potassium has not improved despite these measures (6.4, mild hemolysis)  - Monitor post HD    Anion gap metabolic acidosis  pH 7.27, anion gap 23, bicarb 11  - HD as above    UA with moderate bacteria, moderate epithelial cells   - Likely contaminated, obtain repeat    #Endocrine   Enlarged adrenal glands  - AM cortisol    #Hematology   - Anemia to 11.8 -> 10.2   - Noted to have vaginal bleeding, recent epistaxis  - GYN consult in AM    INR 2.37 likely 2/2 uremic platelet dysfunction  - Monitor coags    #DVT AND GI PROPHYLAXIS  Hold chemo PPX iso uremic platelet dysfunction  SCDs    #Code Status: Full code. Daughter is NOK per patient     Assessment:  · Assessment	  66 YO F with history of uterine cancer (s/p hysterectomy, finished chemo 4 months ago) presenting with 3 weeks of painless jaundice, imaging concerning for malignant biliary obstruction.     #Neuro  A&Ox3  - Pt is now very lethargic i/s/o renal failure vs possible cholangiocarcinoma with metastasis     #CV  - Started levo as MAP remained in 50's   - will trend MAP    #PULMONARY   CT with consolidation in lingula and RML - atelectasis vs. pneumonia  - Saturating well on room air    #INFECTIOUS DISEASE   - Afebrile, no leukocytosis, RVP negative however consolidation noted in lingula/RML atelectasis vs. 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   - Urgent HD 12/15  - Obtain urine protein, urine creatinine, and urine sodium  - Obtain renal US  - Strict I&O's     Hyperkalemia  - 5.8 on admission, for which she got IV insulin/D50, Lokelma, and calcium gluconate.   - Potassium has not improved despite these measures (6.4, mild hemolysis)  - Monitor post HD    Anion gap metabolic acidosis  pH 7.27, anion gap 23, bicarb 11  - HD as above    UA with moderate bacteria, moderate epithelial cells   - Likely contaminated, obtain repeat    #Endocrine   Enlarged adrenal glands  - AM cortisol    #Hematology   - Anemia to 11.8 -> 10.2   - Noted to have vaginal bleeding, recent epistaxis  - GYN consult in AM    INR 2.37 likely 2/2 uremic platelet dysfunction  - Monitor coags    #DVT AND GI PROPHYLAXIS  Hold chemo PPX iso uremic platelet dysfunction  SCDs    #Code Status: Full code. Daughter is NOK per patient     Assessment:  · Assessment	  68 YO F with history of uterine cancer (s/p hysterectomy, finished chemo 4 months ago) presenting with 3 weeks of painless jaundice, imaging concerning for malignant biliary obstruction.     #Neuro  A&Ox3  - Pt is now very lethargic i/s/o renal failure vs possible cholangiocarcinoma with metastasis     #CV  - Started levo as MAP pt was mapping 50's throughout the night    - will trend MAP    #PULMONARY   CT with consolidation in lingula and RML - atelectasis vs. pneumonia  CXR with right base atelectasis vs infiltrate   - Saturating well on room air    #INFECTIOUS DISEASE   - Afebrile, no leukocytosis, RVP negative however consolidation noted in lingula/RML atelectasis vs. 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   - Pt not receiving fluid over night, IVC 1.3- to get LR today   - Urgent HD 12/15, plan for urgent HD 12/16 as well   - Urine protein, urine creatinine, and urine sodium wnl   - Obtain renal US  - Strict I&O's     Hyperkalemia  - 5.8 on admission, for which she got IV insulin/D50, Lokelma, and calcium gluconate.   - Potassium 4.7 after HD   - Will continue to monitor post HD    Anion gap metabolic acidosis  pH 7.27, anion gap 23, bicarb 11  - AG 18 post HD   - Will continue to monitor post HD     UA with moderate bacteria, moderate epithelial cells   - Likely contaminated, obtain repeat  - repeat showed many bacteria and many large leuks   - culture pending     #Endocrine   Enlarged adrenal glands  - AM cortisol    #Hematology   - Anemia to 11.8 -> 10.2   - Noted to have vaginal bleeding, recent epistaxis  - GYN consult in AM    INR 2.37 likely 2/2 uremic platelet dysfunction  - Monitor coags    #DVT AND GI PROPHYLAXIS  Hold chemo PPX iso uremic platelet dysfunction  SCDs    #Code Status: Full code. Daughter is NOK per patient     Assessment:  · Assessment	  68 YO F with history of uterine cancer (s/p hysterectomy, finished chemo 4 months ago) presenting with 3 weeks of painless jaundice, imaging concerning for malignant biliary obstruction.     #Neuro  A&Ox3  - Pt is now very lethargic i/s/o renal failure vs possible cholangiocarcinoma with metastasis     #CV  - Started levo as MAP pt was mapping 50's throughout the night    - will trend MAP; new goal is SBP 90     #PULMONARY   CT with consolidation in lingula and RML - atelectasis vs. pneumonia  CXR with right base atelectasis vs infiltrate   - Saturating well on room air    #INFECTIOUS DISEASE   - Afebrile, no leukocytosis, RVP negative however consolidation noted in lingula/RML atelectasis vs. 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   - Pt not receiving fluid over night, IVC 1.3- to get LR today   - Urgent HD 12/15, plan for urgent HD 12/16 as well   - Urine protein, urine creatinine, and urine sodium wnl   - Obtain renal US  - Strict I&O's     Hyperkalemia  - 5.8 on admission, for which she got IV insulin/D50, Lokelma, and calcium gluconate.   - Potassium 4.7 after HD   - Will continue to monitor post HD    Anion gap metabolic acidosis  pH 7.27, anion gap 23, bicarb 11  - AG 18 post HD   - Will continue to monitor post HD     UA with moderate bacteria, moderate epithelial cells   - Likely contaminated, obtain repeat  - repeat showed many bacteria and many large leuks   - culture pending     #Endocrine   Enlarged adrenal glands  - AM cortisol    #Hematology   - Anemia to 11.8 -> 10.2   - Noted to have vaginal bleeding, recent epistaxis  - GYN consult in AM    INR 2.37 likely 2/2 uremic platelet dysfunction  - Monitor coags    #DVT AND GI PROPHYLAXIS  Hold chemo PPX iso uremic platelet dysfunction  SCDs    #Code Status: Full code. Daughter is NOK per patient     Assessment:  · Assessment	  68 YO F with history of uterine cancer (s/p hysterectomy, finished chemo 4 months ago) presenting with 3 weeks of painless jaundice, imaging concerning for malignant biliary obstruction.     #Neuro  A&Ox3  - Pt is now very lethargic i/s/o renal failure vs possible cholangiocarcinoma with metastasis     #CV  - Started levo as MAP pt was mapping 50's throughout the night    - will trend MAP; new goal is SBP 90     #PULMONARY   CT with consolidation in lingula and RML - atelectasis vs. pneumonia  CXR with right base atelectasis vs infiltrate   - Saturating well on room air    #INFECTIOUS DISEASE   - Afebrile, no leukocytosis, RVP negative however consolidation noted in lingula/RML atelectasis vs. 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 Per outpatient oncologist, had progressively worsening presumed uterine cancer. Outpatient, patient was evaled by oncologist with worsening abdominal mass and was recommended for CT at hospital revealing new cholangiocarcinoma. Was planning hospice and chemo was stopped as uterine cancer was refractory to treatment  - Surgery on board  - GI to do MR MRCP today   - IR notes no indication for tube at this time   - Obtain tumor markers (CA 19-9, CEA, CA-125)  - Pain management as needed   - CTM liver function  - NPO     #RENAL  No history of kidney disease, admission SCr 7.29  - possible ATN vs bile cast nephropathy vs pre-renal etiology.   - Pt not receiving fluid over night, IVC 1.3- to get LR today   - Urgent HD 12/15, plan for urgent HD 12/16 as well   - Urine protein, urine creatinine, and urine sodium wnl   - Obtain renal US  - Strict I&O's     Hyperkalemia  - 5.8 on admission, for which she got IV insulin/D50, Lokelma, and calcium gluconate.   - Potassium 4.7 after HD   - Will continue to monitor post HD    Anion gap metabolic acidosis  pH 7.27, anion gap 23, bicarb 11  - AG 18 post HD   - Will continue to monitor post HD     UA with moderate bacteria, moderate epithelial cells   - Likely contaminated, obtain repeat  - repeat showed many bacteria and many large leuks   - culture pending     #Endocrine   Enlarged adrenal glands  - AM cortisol    #Hematology   - Anemia to 11.8 -> 10.2   - Noted to have vaginal bleeding, recent epistaxis  - GYN consult in AM    INR 2.37 likely 2/2 uremic platelet dysfunction  - Monitor coags    #DVT AND GI PROPHYLAXIS  Hold chemo PPX iso uremic platelet dysfunction  SCDs    #Code Status: Full code. Daughter is NOK per patient     Assessment:  · Assessment	  66 YO F with history of uterine cancer (s/p hysterectomy, finished chemo 4 months ago) presenting with 3 weeks of painless jaundice, imaging concerning for malignant biliary obstruction.     #Neuro  A&Ox3  - Pt is now very lethargic i/s/o renal failure vs cholangiocarcinoma with metastasis     #CV  - Started levo as MAP pt was mapping 50's throughout the night    - will trend MAP; new goal is SBP 90     #PULMONARY   CT with consolidation in lingula and RML - atelectasis vs. pneumonia  CXR with right base atelectasis vs infiltrate   - Saturating well on room air    #INFECTIOUS DISEASE   - Afebrile, no leukocytosis, RVP negative however consolidation noted in lingula/RML atelectasis vs. 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 Per outpatient oncologist, had progressively worsening presumed uterine cancer. Outpatient, patient was evaled by oncologist with worsening abdominal mass and was recommended for CT. Received CT at hospital revealing new cholangiocarcinoma. Was planning hospice and chemo was stopped as uterine cancer was refractory to treatment  - Surgery on board  - GI to do MR MRCP today   - IR notes no indication for tube at this time   - Obtain tumor markers (CA 19-9, CEA, CA-125)  - Pain management as needed   - CTM liver function  - NPO     #RENAL  No history of kidney disease, admission SCr 7.29  - possible ATN vs bile cast nephropathy vs pre-renal etiology.   - Pt not receiving fluid over night, IVC 1.3- to get LR today   - Urgent HD 12/15, plan for urgent HD 12/16 as well   - Urine protein, urine creatinine, and urine sodium wnl   - Obtain renal US  - Strict I&O's     Hyperkalemia  - 5.8 on admission, for which she got IV insulin/D50, Lokelma, and calcium gluconate.   - Potassium 4.7 after HD   - Will continue to monitor post HD    Anion gap metabolic acidosis  pH 7.27, anion gap 23, bicarb 11  - AG 18 post HD   - Will continue to monitor post HD     UA with moderate bacteria, moderate epithelial cells   - Likely contaminated, obtain repeat  - repeat showed many bacteria and many large leuks   - culture pending     #Endocrine   Enlarged adrenal glands  - AM cortisol ordered     #Hematology   - Anemia to 11.8 -> 10.2   - Noted to have vaginal bleeding, recent epistaxis  - GYN consult in AM    INR 2.37 likely 2/2 uremic platelet dysfunction  - Monitor coags    #DVT AND GI PROPHYLAXIS  Hold chemo PPX iso uremic platelet dysfunction  SCDs    #Code Status: Full code. Daughter is NOK per patient   Dr. Brewster and KELVIN Nixon spoke with daughter today. Daughter would like to continue with scanning and testing for now.     Assessment:  · Assessment	  68 YO F with history of uterine cancer (s/p hysterectomy, finished chemo 4 months ago) presenting with 3 weeks of painless jaundice, imaging concerning for malignant biliary obstruction.     #Neuro  A&Ox3  - Pt is now very lethargic i/s/o renal failure vs probable cholangiocarcinoma with metastasis     #CV  - Started levo as MAP pt was mapping 50's throughout the night    - will trend MAP; new goal is SBP 90     #PULMONARY   CT with consolidation in lingula and RML - atelectasis vs. pneumonia  CXR with right base atelectasis vs infiltrate   - Saturating well on room air    #INFECTIOUS DISEASE   - Afebrile, no leukocytosis, RVP negative however consolidation noted in lingula/RML atelectasis vs. 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 Per outpatient oncologist, had progressively worsening presumed uterine cancer. Outpatient, patient was evaled by oncologist with worsening abdominal mass and was recommended for CT. Received CT at hospital revealing new cholangiocarcinoma. Was planning hospice and chemo was stopped as uterine cancer was refractory to treatment  - Surgery on board  - GI to do MR MRCP today   - IR notes no indication for tube at this time   - Obtain tumor markers (CA 19-9, CEA, CA-125)  - Pain management as needed   - CTM liver function  - NPO     #RENAL  No history of kidney disease, admission SCr 7.29  - possible ATN vs bile cast nephropathy vs pre-renal etiology.   - Pt not receiving fluid over night, IVC 1.3- to get LR today   - Urgent HD 12/15, plan for urgent HD 12/16 as well   - Urine protein, urine creatinine, and urine sodium wnl   - Obtain renal US  - Strict I&O's     Hyperkalemia  - 5.8 on admission, for which she got IV insulin/D50, Lokelma, and calcium gluconate.   - Potassium 4.7 after HD   - Will continue to monitor post HD    Anion gap metabolic acidosis  pH 7.27, anion gap 23, bicarb 11  - AG 18 post HD   - Will continue to monitor post HD     UA with moderate bacteria, moderate epithelial cells   - Likely contaminated, obtain repeat  - repeat showed many bacteria and many large leuks   - culture pending     #Endocrine   Enlarged adrenal glands  - AM cortisol ordered     #Hematology   - Anemia to 11.8 -> 10.2   - Noted to have vaginal bleeding, recent epistaxis  - GYN consult in AM    INR 2.37 likely 2/2 uremic platelet dysfunction  - Monitor coags    #DVT AND GI PROPHYLAXIS  Hold chemo PPX iso uremic platelet dysfunction  SCDs    #Code Status: Full code. Daughter is NOK per patient   Dr. Brewster and KELVIN Nixon spoke with daughter today. Daughter would like to continue with scanning and testing for now.     Assessment:  · Assessment	  66 YO F with history of uterine cancer (s/p hysterectomy, finished chemo 4 months ago) presenting with 3 weeks of painless jaundice, imaging concerning for malignant biliary obstruction.     #Neuro  A&Ox3  - Pt is now very lethargic i/s/o renal failure vs probable cholangiocarcinoma with metastasis     #CV  - Started levo as MAP pt was mapping 50's throughout the night    - will trend MAP; new goal is SBP 90     #PULMONARY   CT with consolidation in lingula and RML - atelectasis vs. pneumonia  CXR with right base atelectasis vs infiltrate   - Saturating well on room air    #INFECTIOUS DISEASE   - Afebrile, no leukocytosis, RVP negative however consolidation noted in lingula/RML atelectasis vs. 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 Per outpatient oncologist, had progressively worsening presumed uterine cancer. Outpatient, patient was evaled by oncologist with worsening abdominal mass and was recommended for CT. Received CT at hospital revealing new cholangiocarcinoma. Was planning hospice and chemo was stopped as uterine cancer was refractory to treatment  - Surgery on board  - GI to do MR MRCP today   - IR notes no indication for tube at this time   - Obtain tumor markers (CA 19-9, CEA, CA-125)  - Pain management as needed   - CTM liver function  - NPO     #RENAL  No history of kidney disease, admission SCr 7.29  - possible ATN vs bile cast nephropathy vs pre-renal etiology.   - Pt not receiving fluid over night, IVC 1.3- to get LR today   - Urgent HD 12/15, plan for urgent HD 12/16 as well   - Urine protein, urine creatinine, and urine sodium wnl   - Obtain renal US  - Strict I&O's     Hyperkalemia  - 5.8 on admission, for which she got IV insulin/D50, Lokelma, and calcium gluconate.   - Potassium 4.7 after HD   - Will continue to monitor post HD    Anion gap metabolic acidosis  pH 7.27, anion gap 23, bicarb 11  - AG 18 post HD   - Will continue to monitor post HD     UA with moderate bacteria, moderate epithelial cells   - Likely contaminated, obtain repeat  - repeat showed many bacteria and many large leuks   - culture pending     #Endocrine   Enlarged adrenal glands  - AM cortisol ordered     #Hematology   - Anemia to 11.8 -> 10.2   - Noted to have vaginal bleeding, recent epistaxis  - GYN consult in AM    INR 2.37 likely 2/2 uremic platelet dysfunction  - Monitor coags    #DVT AND GI PROPHYLAXIS  Hold chemo PPX iso uremic platelet dysfunction  SCDs    #Code Status: Full code. Daughter is NOK per patient   Dr. Brewster and KELVIN Nixon spoke with daughter today. Daughter would like to continue with scanning and testing for now.   Onc consulted today for second opinion. No further treatment from onc. If pt improves, can go to [facility].

## 2022-12-17 NOTE — PROGRESS NOTE ADULT - ATTENDING COMMENTS
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 Tolerating HD, requiring minimal IV pressor support. Will add midodrine. Plan for eventual MRCP per GI. 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 Tolerating HD, requiring minimal IV pressor support. Will add midodrine. Plan for eventual MRCP per GI.

## 2022-12-17 NOTE — CHART NOTE - NSCHARTNOTEFT_GEN_A_CORE
MICU Transfer Note    Transfer from: MICU  Transfer to:  ( X ) Medicine    (  ) Telemetry    (  ) RCU    (  ) Palliative    (  ) Stroke Unit    (  ) _______________    Accepting physician:    HPI:   Ms. 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     Patient was at usual state of health performing IADLs until 2 weeks prior to admission when she developed scleral icterus, pruritus, intolerance of solid food, and reduced PO intake. She endorsed a feeling of prolonged fullness with meals that would eventually on occasion prompt her to induce vomiting. Last had broth on 12/14 lunch 1 day prior to admission; had tea this morning. Also endorsed HERNANDEZ, "puffy" abdomen, light colored stools (at bedside), +dark urine. No fevers, chills, confusion, tremors, abdominal pain, sick contacts, bloody vomit/bloody stool, petechial rash/bruising/other bleeding.    Notably K 5.9 at OSH/ED, received calcium gluconate and Lokelma.    (15 Dec 2022 17:50)          MICU COURSE: Patient was taken to MICU with right IJ placement for urgent dialysis. She received dialysis with improvement in lethargy but became hypotensive requiring levophed. Patient continued dialysis and was weaned off levophed with midodrine. Patient was stable for floors          ASSESSMENT & PLAN: 66 YO F with history of uterine cancer (s/p hysterectomy, finished chemo 4 months ago) presenting with 3 weeks of painless jaundice, imaging concerning for malignant biliary obstruction.     #Neuro  A&Ox3  - Pt is now very lethargic i/s/o renal failure vs probable cholangiocarcinoma with metastasis     #CV  - Started levo as MAP pt was mapping 50's throughout the night    - will trend MAP; new goal is SBP 90     #PULMONARY   CT with consolidation in lingula and RML - atelectasis vs. pneumonia  CXR with right base atelectasis vs infiltrate   - Saturating well on room air    #INFECTIOUS DISEASE   - Afebrile, no leukocytosis, RVP negative however consolidation noted in lingula/RML atelectasis vs. 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 Per outpatient oncologist, had progressively worsening presumed uterine cancer. Outpatient, patient was evaled by oncologist with worsening abdominal mass and was recommended for CT. Received CT at hospital revealing new cholangiocarcinoma. Was planning hospice and chemo was stopped as uterine cancer was refractory to treatment  - Surgery on board  - GI to do MR MRCP today   - IR notes no indication for tube at this time   - Obtain tumor markers (CA 19-9, CEA, CA-125)  - Pain management as needed   - CTM liver function  - NPO     #RENAL  No history of kidney disease, admission SCr 7.29  - possible ATN vs bile cast nephropathy vs pre-renal etiology.   - Pt not receiving fluid over night, IVC 1.3- to get LR today   - Urgent HD 12/15, plan for urgent HD 12/16 as well   - Urine protein, urine creatinine, and urine sodium wnl   - Obtain renal US  - Strict I&O's     Hyperkalemia  - 5.8 on admission, for which she got IV insulin/D50, Lokelma, and calcium gluconate.   - Potassium 4.7 after HD   - Will continue to monitor post HD    Anion gap metabolic acidosis  pH 7.27, anion gap 23, bicarb 11  - AG 18 post HD   - Will continue to monitor post HD     UA with moderate bacteria, moderate epithelial cells   - Likely contaminated, obtain repeat  - repeat showed many bacteria and many large leuks   - culture pending     #Endocrine   Enlarged adrenal glands  - AM cortisol ordered     #Hematology   - Anemia to 11.8 -> 10.2   - Noted to have vaginal bleeding, recent epistaxis in setting of uremia. Improving s/p dialysis    INR 2.37 likely 2/2 uremic platelet dysfunction  - Monitor coags    #DVT AND GI PROPHYLAXIS  Hold chemo PPX iso uremic platelet dysfunction  SCDs    #Code Status: Full code. Daughter is NOK per patient   Dr. Brewster and KELVIN Nixon spoke with daughter 12/16. Daughter would like to continue with scanning and testing for now.   Onc consulted 12/16 for second opinion. No further treatment from onc. If pt improves, can go to [facility].         For Follow-Up: [ ] nephro recs  [ ] [facility] follow up outpatient          Vital Signs Last 24 Hrs  T(C): 37.1 (17 Dec 2022 11:25), Max: 37.1 (17 Dec 2022 04:00)  T(F): 98.8 (17 Dec 2022 11:25), Max: 98.8 (17 Dec 2022 11:25)  HR: 89 (17 Dec 2022 11:50) (88 - 96)  BP: 81/51 (17 Dec 2022 11:50) (74/56 - 115/72)  BP(mean): 61 (17 Dec 2022 11:50) (56 - 85)  RR: 30 (17 Dec 2022 11:50) (17 - 40)  SpO2: 97% (17 Dec 2022 11:30) (92% - 97%)    Parameters below as of 17 Dec 2022 11:25  Patient On (Oxygen Delivery Method): room air      I&O's Summary    16 Dec 2022 07:01  -  17 Dec 2022 07:00  --------------------------------------------------------  IN: 2154.9 mL / OUT: 200 mL / NET: 1954.9 mL    17 Dec 2022 07:01  -  17 Dec 2022 11:58  --------------------------------------------------------  IN: 106.5 mL / OUT: 0 mL / NET: 106.5 mL          MEDICATIONS  (STANDING):  chlorhexidine 2% Cloths 1 Application(s) Topical <User Schedule>  dextrose 50% Injectable 25 Gram(s) IV Push once  dextrose 50% Injectable 12.5 Gram(s) IV Push once  dextrose 50% Injectable 25 Gram(s) IV Push once  dextrose Oral Gel 15 Gram(s) Oral once  glucagon  Injectable 1 milliGRAM(s) IntraMuscular once  midodrine 20 milliGRAM(s) Oral every 8 hours  norepinephrine Infusion 0.05 MICROgram(s)/kG/Min (7.16 mL/Hr) IV Continuous <Continuous>  piperacillin/tazobactam IVPB.. 3.375 Gram(s) IV Intermittent every 12 hours    MEDICATIONS  (PRN):        LABS                                            9.5                   Neurophils% (auto):   77.6   (12-17 @ 03:45):    11.63)-----------(196          Lymphocytes% (auto):  5.8                                           27.1                   Eosinphils% (auto):   0.4      Manual%: Neutrophils x    ; Lymphocytes x    ; Eosinophils x    ; Bands%: x    ; Blasts x                                    132    |  96     |  68                  Calcium: 9.3   / iCa: x      (12-17 @ 03:45)    ----------------------------<  117       Magnesium: 2.30                             4.4     |  17     |  4.88             Phosphorous: 5.0      TPro  5.6    /  Alb  2.4    /  TBili  33.7   /  DBili  x      /  AST  123    /  ALT  38     /  AlkPhos  475    17 Dec 2022 03:45    ( 12-16 @ 17:42 )   PT: 32.9 sec;   INR: 2.81 ratio  aPTT: 49.2 sec MICU Transfer Note    Transfer from: MICU  Transfer to:  ( X ) Medicine    (  ) Telemetry    (  ) RCU    (  ) Palliative    (  ) Stroke Unit    (  ) _______________    Accepting physician:     HPI:   Ms. 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     Patient was at usual state of health performing IADLs until 2 weeks prior to admission when she developed scleral icterus, pruritus, intolerance of solid food, and reduced PO intake. She endorsed a feeling of prolonged fullness with meals that would eventually on occasion prompt her to induce vomiting. Last had broth on 12/14 lunch 1 day prior to admission; had tea this morning. Also endorsed HERNANDEZ, "puffy" abdomen, light colored stools (at bedside), +dark urine. No fevers, chills, confusion, tremors, abdominal pain, sick contacts, bloody vomit/bloody stool, petechial rash/bruising/other bleeding.    Notably K 5.9 at OSH/ED, received calcium gluconate and Lokelma.    (15 Dec 2022 17:50)          MICU COURSE: Patient was taken to MICU with right IJ placement for urgent dialysis. She received dialysis with improvement in lethargy but became hypotensive requiring levophed. Patient continued dialysis and was weaned off levophed with midodrine. Patient was stable for floors          ASSESSMENT & PLAN: 66 YO F with history of uterine cancer (s/p hysterectomy, finished chemo 4 months ago) presenting with 3 weeks of painless jaundice, imaging concerning for malignant biliary obstruction.     #Neuro  A&Ox3  - Pt is now very lethargic i/s/o renal failure vs probable cholangiocarcinoma with metastasis     #CV  - Started levo as MAP pt was mapping 50's throughout the night    - will trend MAP; new goal is SBP 90     #PULMONARY   CT with consolidation in lingula and RML - atelectasis vs. pneumonia  CXR with right base atelectasis vs infiltrate   - Saturating well on room air    #INFECTIOUS DISEASE   - Afebrile, no leukocytosis, RVP negative however consolidation noted in lingula/RML atelectasis vs. 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 Per outpatient oncologist, had progressively worsening presumed uterine cancer. Outpatient, patient was evaled by oncologist with worsening abdominal mass and was recommended for CT. Received CT at hospital revealing new cholangiocarcinoma. Was planning hospice and chemo was stopped as uterine cancer was refractory to treatment  - Surgery on board  - GI to do MR MRCP today   - IR notes no indication for tube at this time   - Obtain tumor markers (CA 19-9, CEA, CA-125)  - Pain management as needed   - CTM liver function  - NPO     #RENAL  No history of kidney disease, admission SCr 7.29  - possible ATN vs bile cast nephropathy vs pre-renal etiology.   - Pt not receiving fluid over night, IVC 1.3- to get LR today   - Urgent HD 12/15, plan for urgent HD 12/16 as well   - Urine protein, urine creatinine, and urine sodium wnl   - Obtain renal US  - Strict I&O's     Hyperkalemia  - 5.8 on admission, for which she got IV insulin/D50, Lokelma, and calcium gluconate.   - Potassium 4.7 after HD   - Will continue to monitor post HD    Anion gap metabolic acidosis  pH 7.27, anion gap 23, bicarb 11  - AG 18 post HD   - Will continue to monitor post HD     UA with moderate bacteria, moderate epithelial cells   - Likely contaminated, obtain repeat  - repeat showed many bacteria and many large leuks   - culture pending     #Endocrine   Enlarged adrenal glands  - AM cortisol ordered     #Hematology   - Anemia to 11.8 -> 10.2   - Noted to have vaginal bleeding, recent epistaxis in setting of uremia. Improving s/p dialysis    INR 2.37 likely 2/2 uremic platelet dysfunction  - Monitor coags    #DVT AND GI PROPHYLAXIS  Hold chemo PPX iso uremic platelet dysfunction  SCDs    #Code Status: Full code. Daughter is NOK per patient   Dr. Brewster and KELVIN Nixon spoke with daughter 12/16. Daughter would like to continue with scanning and testing for now.   Onc consulted 12/16 for second opinion. No further treatment from onc. If pt improves, can go to [facility].         For Follow-Up: [ ] nephro recs  [ ] [facility] follow up outpatient          Vital Signs Last 24 Hrs  T(C): 37.1 (17 Dec 2022 11:25), Max: 37.1 (17 Dec 2022 04:00)  T(F): 98.8 (17 Dec 2022 11:25), Max: 98.8 (17 Dec 2022 11:25)  HR: 89 (17 Dec 2022 11:50) (88 - 96)  BP: 81/51 (17 Dec 2022 11:50) (74/56 - 115/72)  BP(mean): 61 (17 Dec 2022 11:50) (56 - 85)  RR: 30 (17 Dec 2022 11:50) (17 - 40)  SpO2: 97% (17 Dec 2022 11:30) (92% - 97%)    Parameters below as of 17 Dec 2022 11:25  Patient On (Oxygen Delivery Method): room air      I&O's Summary    16 Dec 2022 07:01  -  17 Dec 2022 07:00  --------------------------------------------------------  IN: 2154.9 mL / OUT: 200 mL / NET: 1954.9 mL    17 Dec 2022 07:01  -  17 Dec 2022 11:58  --------------------------------------------------------  IN: 106.5 mL / OUT: 0 mL / NET: 106.5 mL          MEDICATIONS  (STANDING):  chlorhexidine 2% Cloths 1 Application(s) Topical <User Schedule>  dextrose 50% Injectable 25 Gram(s) IV Push once  dextrose 50% Injectable 12.5 Gram(s) IV Push once  dextrose 50% Injectable 25 Gram(s) IV Push once  dextrose Oral Gel 15 Gram(s) Oral once  glucagon  Injectable 1 milliGRAM(s) IntraMuscular once  midodrine 20 milliGRAM(s) Oral every 8 hours  norepinephrine Infusion 0.05 MICROgram(s)/kG/Min (7.16 mL/Hr) IV Continuous <Continuous>  piperacillin/tazobactam IVPB.. 3.375 Gram(s) IV Intermittent every 12 hours    MEDICATIONS  (PRN):        LABS                                            9.5                   Neurophils% (auto):   77.6   (12-17 @ 03:45):    11.63)-----------(196          Lymphocytes% (auto):  5.8                                           27.1                   Eosinphils% (auto):   0.4      Manual%: Neutrophils x    ; Lymphocytes x    ; Eosinophils x    ; Bands%: x    ; Blasts x                                    132    |  96     |  68                  Calcium: 9.3   / iCa: x      (12-17 @ 03:45)    ----------------------------<  117       Magnesium: 2.30                             4.4     |  17     |  4.88             Phosphorous: 5.0      TPro  5.6    /  Alb  2.4    /  TBili  33.7   /  DBili  x      /  AST  123    /  ALT  38     /  AlkPhos  475    17 Dec 2022 03:45    ( 12-16 @ 17:42 )   PT: 32.9 sec;   INR: 2.81 ratio  aPTT: 49.2 sec

## 2022-12-17 NOTE — PROGRESS NOTE ADULT - PROBLEM SELECTOR PLAN 1
Pt with severe DEMARCO in the setting of hypotension, decreased PO intake, and hyperbilirubinemia. Pt denies any prior known history of kidney disease. Upon review of [place], Scr was initially 7.29 on 12/15/22. No prior labs are available for review, exact duration of Scr elevation is unclear. UA shows hematuria, and proteinuria. Bilirubin level significantly elevated at 32.7. No evidence of obstruction on CT abdomen. 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 Pt received HD session on 12/15/22 for hyperkalemia resistant to medical therapy and worsening metabolic acidosis. Spot urine TP/CR is elevated at 1.1. Renal US ruled out hydronephrosis. Labs reviewed. Pt currently oliguric. Plan for HD today. Monitor labs and urine output. Avoid nephrotoxins. Dose medications as per eGFR.

## 2022-12-17 NOTE — PROGRESS NOTE ADULT - ATTENDING COMMENTS
Patient examined and ROS reviewed. A case of  DEMARCO with hyperkalemia and acidosis in the setting of hypotension, decreased PO intake, and hyperbilirubinemia. Underlying cause seems to be ATN or bile cast nephropathy. Patient continue to be oliguric and hyperkalemic. Advised HD today.

## 2022-12-17 NOTE — PROGRESS NOTE ADULT - SUBJECTIVE AND OBJECTIVE BOX
INTERVAL HPI/OVERNIGHT EVENTS: No overnight events    SUBJECTIVE: Patient seen and examined at bedside.       VITAL SIGNS:  ICU Vital Signs Last 24 Hrs  T(C): 37.1 (17 Dec 2022 04:00), Max: 37.1 (17 Dec 2022 04:00)  T(F): 98.7 (17 Dec 2022 04:00), Max: 98.7 (17 Dec 2022 04:00)  HR: 91 (17 Dec 2022 07:00) (91 - 104)  BP: 93/59 (17 Dec 2022 07:00) (76/46 - 115/72)  BP(mean): 70 (17 Dec 2022 07:00) (53 - 85)  ABP: --  ABP(mean): --  RR: 27 (17 Dec 2022 07:00) (17 - 40)  SpO2: 96% (17 Dec 2022 07:00) (92% - 96%)    O2 Parameters below as of 17 Dec 2022 07:00  Patient On (Oxygen Delivery Method): room air            Plateau pressure:   P/F ratio:     12-16 @ 07:01  -  12-17 @ 07:00  --------------------------------------------------------  IN: 2115.6 mL / OUT: 200 mL / NET: 1915.6 mL      CAPILLARY BLOOD GLUCOSE      POCT Blood Glucose.: 112 mg/dL (16 Dec 2022 17:24)    ECG:    PHYSICAL EXAM:    General:   HEENT:   Neck:   Respiratory:   Cardiovascular:   Abdomen:   Extremities:  Neurological:    MEDICATIONS:  MEDICATIONS  (STANDING):  chlorhexidine 2% Cloths 1 Application(s) Topical <User Schedule>  dextrose 50% Injectable 25 Gram(s) IV Push once  dextrose 50% Injectable 12.5 Gram(s) IV Push once  dextrose 50% Injectable 25 Gram(s) IV Push once  dextrose Oral Gel 15 Gram(s) Oral once  glucagon  Injectable 1 milliGRAM(s) IntraMuscular once  norepinephrine Infusion 0.05 MICROgram(s)/kG/Min (7.16 mL/Hr) IV Continuous <Continuous>  piperacillin/tazobactam IVPB.. 3.375 Gram(s) IV Intermittent every 12 hours    MEDICATIONS  (PRN):      ALLERGIES:  Allergies    No Known Allergies    Intolerances        LABS:                        9.5    11.63 )-----------( 196      ( 17 Dec 2022 03:45 )             27.1         132<L>  |  96<L>  |  68<H>  ----------------------------<  117<H>  4.4   |  17<L>  |  4.88<H>    Ca    9.3      17 Dec 2022 03:45  Phos  5.0       Mg     2.30         TPro  5.6<L>  /  Alb  2.4<L>  /  TBili  33.7<H>  /  DBili  x   /  AST  123<H>  /  ALT  38<H>  /  AlkPhos  475<H>      PT/INR - ( 16 Dec 2022 17:42 )   PT: 32.9 sec;   INR: 2.81 ratio         PTT - ( 16 Dec 2022 17:42 )  PTT:49.2 sec  Urinalysis Basic - ( 15 Dec 2022 20:30 )    Color: Katina / Appearance: Slightly Turbid / S.014 / pH: x  Gluc: x / Ketone: Negative  / Bili: Large / Urobili: <2 mg/dL   Blood: x / Protein: 30 mg/dL / Nitrite: Negative   Leuk Esterase: Large / RBC: 10 /HPF / WBC 10 /HPF   Sq Epi: x / Non Sq Epi: 5 /HPF / Bacteria: Many        RADIOLOGY & ADDITIONAL TESTS: Reviewed.   INTERVAL HPI/OVERNIGHT EVENTS: No overnight events    SUBJECTIVE: Patient seen and examined at bedside. Feels improvement in lethargy. No fever, chills, diarrhea, cough, dysuria      VITAL SIGNS:  ICU Vital Signs Last 24 Hrs  T(C): 37.1 (17 Dec 2022 04:00), Max: 37.1 (17 Dec 2022 04:00)  T(F): 98.7 (17 Dec 2022 04:00), Max: 98.7 (17 Dec 2022 04:00)  HR: 91 (17 Dec 2022 07:00) (91 - 104)  BP: 93/59 (17 Dec 2022 07:00) (76/46 - 115/72)  BP(mean): 70 (17 Dec 2022 07:00) (53 - 85)  ABP: --  ABP(mean): --  RR: 27 (17 Dec 2022 07:00) (17 - 40)  SpO2: 96% (17 Dec 2022 07:00) (92% - 96%)    O2 Parameters below as of 17 Dec 2022 07:00  Patient On (Oxygen Delivery Method): room air            Plateau pressure:   P/F ratio:     12-16 @ 07:01  -  12-17 @ 07:00  --------------------------------------------------------  IN: 2115.6 mL / OUT: 200 mL / NET: 1915.6 mL      CAPILLARY BLOOD GLUCOSE      POCT Blood Glucose.: 112 mg/dL (16 Dec 2022 17:24)    ECG:    PHYSICAL EXAM:    HEENT: PERRLA, EOMI, no drainage or redness, scleral icterus   Neck: supple,  No JVD, Trachea midline  Respiratory: Breath Sounds equal & clear bilaterally to auscultation, no accessory muscle use noted  Cardiovascular: Regular rate, regular rhythm, normal S1, S2; no murmurs or rub  Gastrointestinal: abdominal mass noticeable on palpation RUQ  Extremities: KENNEDY x 4, no peripheral edema, no cyanosis, no clubbing   Vascular: Equal and normal pulses: 2+ peripheral pulses throughout  Neurological: A+O x 3 but lethargic; speech very soft  Musculoskeletal: No joint swelling or deformity; no limitation of movement  Skin: warm, dry, well perfused, no rashes    MEDICATIONS:  MEDICATIONS  (STANDING):  chlorhexidine 2% Cloths 1 Application(s) Topical <User Schedule>  dextrose 50% Injectable 25 Gram(s) IV Push once  dextrose 50% Injectable 12.5 Gram(s) IV Push once  dextrose 50% Injectable 25 Gram(s) IV Push once  dextrose Oral Gel 15 Gram(s) Oral once  glucagon  Injectable 1 milliGRAM(s) IntraMuscular once  norepinephrine Infusion 0.05 MICROgram(s)/kG/Min (7.16 mL/Hr) IV Continuous <Continuous>  piperacillin/tazobactam IVPB.. 3.375 Gram(s) IV Intermittent every 12 hours    MEDICATIONS  (PRN):      ALLERGIES:  Allergies    No Known Allergies    Intolerances        LABS:                        9.5    11.63 )-----------( 196      ( 17 Dec 2022 03:45 )             27.1     12-    132<L>  |  96<L>  |  68<H>  ----------------------------<  117<H>  4.4   |  17<L>  |  4.88<H>    Ca    9.3      17 Dec 2022 03:45  Phos  5.0     12-  Mg     2.30     12-    TPro  5.6<L>  /  Alb  2.4<L>  /  TBili  33.7<H>  /  DBili  x   /  AST  123<H>  /  ALT  38<H>  /  AlkPhos  475<H>  12-    PT/INR - ( 16 Dec 2022 17:42 )   PT: 32.9 sec;   INR: 2.81 ratio         PTT - ( 16 Dec 2022 17:42 )  PTT:49.2 sec  Urinalysis Basic - ( 15 Dec 2022 20:30 )    Color: Katina / Appearance: Slightly Turbid / S.014 / pH: x  Gluc: x / Ketone: Negative  / Bili: Large / Urobili: <2 mg/dL   Blood: x / Protein: 30 mg/dL / Nitrite: Negative   Leuk Esterase: Large / RBC: 10 /HPF / WBC 10 /HPF   Sq Epi: x / Non Sq Epi: 5 /HPF / Bacteria: Many        RADIOLOGY & ADDITIONAL TESTS: Reviewed.   INTERVAL HPI/OVERNIGHT EVENTS: No overnight events    SUBJECTIVE: Patient seen and examined at bedside. Feels improvement in lethargy. No fever, chills, diarrhea, cough, dysuria      VITAL SIGNS:  ICU Vital Signs Last 24 Hrs  T(C): 37.1 (17 Dec 2022 04:00), Max: 37.1 (17 Dec 2022 04:00)  T(F): 98.7 (17 Dec 2022 04:00), Max: 98.7 (17 Dec 2022 04:00)  HR: 91 (17 Dec 2022 07:00) (91 - 104)  BP: 93/59 (17 Dec 2022 07:00) (76/46 - 115/72)  BP(mean): 70 (17 Dec 2022 07:00) (53 - 85)  RR: 27 (17 Dec 2022 07:00) (17 - 40)  SpO2: 96% (17 Dec 2022 07:00) (92% - 96%)    O2 Parameters below as of 17 Dec 2022 07:00  Patient On (Oxygen Delivery Method): room air            Plateau pressure:   P/F ratio:     12-16 @ 07:01  -  12-17 @ 07:00  --------------------------------------------------------  IN: 2115.6 mL / OUT: 200 mL / NET: 1915.6 mL      CAPILLARY BLOOD GLUCOSE      POCT Blood Glucose.: 112 mg/dL (16 Dec 2022 17:24)    ECG:    PHYSICAL EXAM:    HEENT: PERRLA, EOMI, no drainage or redness, scleral icterus   Neck: supple,  No JVD, Trachea midline  Respiratory: Breath Sounds equal & clear bilaterally to auscultation, no accessory muscle use noted  Cardiovascular: Regular rate, regular rhythm, normal S1, S2; no murmurs or rub  Gastrointestinal: abdominal mass noticeable on palpation RUQ  Extremities: KENNEDY x 4, no peripheral edema, no cyanosis, no clubbing   Vascular: Equal and normal pulses: 2+ peripheral pulses throughout  Neurological: A+O x 3 but lethargic; speech very soft  Musculoskeletal: No joint swelling or deformity; no limitation of movement  Skin: warm, dry, well perfused, no rashes    MEDICATIONS:  MEDICATIONS  (STANDING):  chlorhexidine 2% Cloths 1 Application(s) Topical <User Schedule>  dextrose 50% Injectable 25 Gram(s) IV Push once  dextrose 50% Injectable 12.5 Gram(s) IV Push once  dextrose 50% Injectable 25 Gram(s) IV Push once  dextrose Oral Gel 15 Gram(s) Oral once  glucagon  Injectable 1 milliGRAM(s) IntraMuscular once  norepinephrine Infusion 0.05 MICROgram(s)/kG/Min (7.16 mL/Hr) IV Continuous <Continuous>  piperacillin/tazobactam IVPB.. 3.375 Gram(s) IV Intermittent every 12 hours    MEDICATIONS  (PRN):      ALLERGIES:  Allergies    No Known Allergies    Intolerances        LABS:                        9.5    11.63 )-----------( 196      ( 17 Dec 2022 03:45 )             27.1     12-    132<L>  |  96<L>  |  68<H>  ----------------------------<  117<H>  4.4   |  17<L>  |  4.88<H>    Ca    9.3      17 Dec 2022 03:45  Phos  5.0     12-  Mg     2.30     12-    TPro  5.6<L>  /  Alb  2.4<L>  /  TBili  33.7<H>  /  DBili  x   /  AST  123<H>  /  ALT  38<H>  /  AlkPhos  475<H>  12-17    PT/INR - ( 16 Dec 2022 17:42 )   PT: 32.9 sec;   INR: 2.81 ratio         PTT - ( 16 Dec 2022 17:42 )  PTT:49.2 sec  Urinalysis Basic - ( 15 Dec 2022 20:30 )    Color: Katina / Appearance: Slightly Turbid / S.014 / pH: x  Gluc: x / Ketone: Negative  / Bili: Large / Urobili: <2 mg/dL   Blood: x / Protein: 30 mg/dL / Nitrite: Negative   Leuk Esterase: Large / RBC: 10 /HPF / WBC 10 /HPF   Sq Epi: x / Non Sq Epi: 5 /HPF / Bacteria: Many        RADIOLOGY & ADDITIONAL TESTS: Reviewed.

## 2022-12-17 NOTE — PROGRESS NOTE ADULT - SUBJECTIVE AND OBJECTIVE BOX
[facility] DIVISION OF KIDNEY DISEASES AND HYPERTENSION -- FOLLOW UP NOTE  --------------------------------------------------------------------------------  HPI: 68 yo F with history of uterine cancer, received chemotherapy and radiation (last treatment ~4 months ago at [facility]) initially presented to [facility] on 12/14 for painless jaundice, decreased appetite, nausea/vomiting, and generalized weakness. 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 Pt was transferred to [facility] on 12/15 for GI evaluation and possible ERCP. Upon review of [facility], Scr was initially 7.29 on 12/15/22, and is elevated but improved to 5.65 with IV fluids. No prior labs are available for review. Exact duration of Scr elevation is unclear. Pt received HD session on 12/15/22 for hyperkalemia and worsening metabolic acidosis. Nephrology was consulted for DEMARCO with hyperkalemia.    Pt was seen and evaluated with sister present at bedside. Pt is awake , endorse feeling lethargic. Denies any headaches, fevers/chills, chest pain, palpitations, SOB, abdominal pain, and leg swelling.     PAST HISTORY  --------------------------------------------------------------------------------  No significant changes to PMH, PSH, FHx, SHx, unless otherwise noted    ALLERGIES & MEDICATIONS  --------------------------------------------------------------------------------  Allergies    No Known Allergies    Intolerances    Standing Inpatient Medications  chlorhexidine 2% Cloths 1 Application(s) Topical <User Schedule>  dextrose 50% Injectable 25 Gram(s) IV Push once  dextrose 50% Injectable 12.5 Gram(s) IV Push once  dextrose 50% Injectable 25 Gram(s) IV Push once  dextrose Oral Gel 15 Gram(s) Oral once  glucagon  Injectable 1 milliGRAM(s) IntraMuscular once  norepinephrine Infusion 0.05 MICROgram(s)/kG/Min IV Continuous <Continuous>  piperacillin/tazobactam IVPB.. 3.375 Gram(s) IV Intermittent every 12 hours    PRN Inpatient Medications    REVIEW OF SYSTEMS  --------------------------------------------------------------------------------  Gen: No fevers , lethargic +  Respiratory: No dyspnea  CV: No chest pain  GI: No abdominal pain  : No dysuria  MSK: No  edema  Neuro: no dizziness   All other systems were reviewed and are negative, except as noted.    VITALS/PHYSICAL EXAM  --------------------------------------------------------------------------------  T(C): 37.1 (12-17-22 @ 04:00), Max: 37.1 (12-17-22 @ 04:00)  HR: 88 (12-17-22 @ 09:45) (88 - 98)  BP: 100/60 (12-17-22 @ 09:45) (81/51 - 115/72)  RR: 27 (12-17-22 @ 09:45) (17 - 40)  SpO2: 95% (12-17-22 @ 09:45) (92% - 96%)  Wt(kg): --  Height (cm): 157.5 (12-15-22 @ 21:00)  Weight (kg): 76.4 (12-15-22 @ 21:00)  BMI (kg/m2): 30.8 (12-15-22 @ 21:00)  BSA (m2): 1.78 (12-15-22 @ 21:00)    12-16-22 @ 07:01  -  12-17-22 @ 07:00  --------------------------------------------------------  IN: 2154.9 mL / OUT: 200 mL / NET: 1954.9 mL    12-17-22 @ 07:01  -  12-17-22 @ 10:00  --------------------------------------------------------  IN: 106.5 mL / OUT: 0 mL / NET: 106.5 mL    Physical Exam:  Gen: ill appearing  HEENT: dry mucous membranes, +scleral icterus  Pulm: CTA B/L  CV: S1S2+  Abd: Soft, +BS   Ext: No LE edema B/L  Neuro: Awake and alert  Skin: +Jaundiced  Vascular access: R IJ non tunneled HD catheter +  	  LABS/STUDIES  --------------------------------------------------------------------------------              9.5    11.63 >-----------<  196      [12-17-22 @ 03:45]              27.1     132  |  96  |  68  ----------------------------<  117      [12-17-22 @ 03:45]  4.4   |  17  |  4.88        Ca     9.3     [12-17-22 @ 03:45]      Mg     2.30     [12-17-22 @ 03:45]      Phos  5.0     [12-17-22 @ 03:45]    TPro  5.6  /  Alb  2.4  /  TBili  33.7  /  DBili  x   /  AST  123  /  ALT  38  /  AlkPhos  475  [12-17-22 @ 03:45]    PT/INR: PT 32.9 , INR 2.81       [12-16-22 @ 17:42]  PTT: 49.2       [12-16-22 @ 17:42]    Creatinine Trend:  SCr 4.88 [12-17 @ 03:45]  SCr 4.71 [12-16 @ 17:46]  SCr 4.52 [12-16 @ 10:11]  SCr 3.81 [12-16 @ 00:55]  SCr 5.65 [12-15 @ 18:20]    Urinalysis - [12-15-22 @ 20:30]      Color Katina / Appearance Slightly Turbid / SG 1.014 / pH 6.0      Gluc Negative / Ketone Negative  / Bili Large / Urobili <2 mg/dL       Blood Large / Protein 30 mg/dL / Leuk Est Large / Nitrite Negative      RBC 10 / WBC 10 / Hyaline  / Gran  / Sq Epi  / Non Sq Epi 5 / Bacteria Many    Urine Creatinine 105      [12-15-22 @ 20:30]  Urine Protein 116      [12-15-22 @ 20:30]  Urine Sodium 40      [12-15-22 @ 20:30]  Urine Urea Nitrogen 460.0      [12-15-22 @ 20:30]  Urine Potassium 40.7      [12-15-22 @ 20:30]  Urine Osmolality 354      [12-15-22 @ 20:30]    HBsAb <3.0      [12-15-22 @ 23:40]  HBsAg Nonreact      [12-15-22 @ 23:40]  HBcAb Nonreact      [12-15-22 @ 23:40]

## 2022-12-17 NOTE — PROGRESS NOTE ADULT - ASSESSMENT
Assessment:  · Assessment	  66 YO F with history of uterine cancer (s/p hysterectomy, finished chemo 4 months ago) presenting with 3 weeks of painless jaundice, imaging concerning for malignant biliary obstruction.     #Neuro  A&Ox3  - Pt is now very lethargic i/s/o renal failure vs probable cholangiocarcinoma with metastasis     #CV  - Started levo as MAP pt was mapping 50's throughout the night    - will trend MAP; new goal is SBP 90     #PULMONARY   CT with consolidation in lingula and RML - atelectasis vs. pneumonia  CXR with right base atelectasis vs infiltrate   - Saturating well on room air    #INFECTIOUS DISEASE   - Afebrile, no leukocytosis, RVP negative however consolidation noted in lingula/RML atelectasis vs. 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 Per outpatient oncologist, had progressively worsening presumed uterine cancer. Outpatient, patient was evaled by oncologist with worsening abdominal mass and was recommended for CT. Received CT at hospital revealing new cholangiocarcinoma. Was planning hospice and chemo was stopped as uterine cancer was refractory to treatment  - Surgery on board  - GI to do MR MRCP today   - IR notes no indication for tube at this time   - Obtain tumor markers (CA 19-9, CEA, CA-125)  - Pain management as needed   - CTM liver function  - NPO     #RENAL  No history of kidney disease, admission SCr 7.29  - possible ATN vs bile cast nephropathy vs pre-renal etiology.   - Pt not receiving fluid over night, IVC 1.3- to get LR today   - Urgent HD 12/15, plan for urgent HD 12/16 as well   - Urine protein, urine creatinine, and urine sodium wnl   - Obtain renal US  - Strict I&O's     Hyperkalemia  - 5.8 on admission, for which she got IV insulin/D50, Lokelma, and calcium gluconate.   - Potassium 4.7 after HD   - Will continue to monitor post HD    Anion gap metabolic acidosis  pH 7.27, anion gap 23, bicarb 11  - AG 18 post HD   - Will continue to monitor post HD     UA with moderate bacteria, moderate epithelial cells   - Likely contaminated, obtain repeat  - repeat showed many bacteria and many large leuks   - culture pending     #Endocrine   Enlarged adrenal glands  - AM cortisol ordered     #Hematology   - Anemia to 11.8 -> 10.2   - Noted to have vaginal bleeding, recent epistaxis in setting of uremia. Improving s/p dialysis    INR 2.37 likely 2/2 uremic platelet dysfunction  - Monitor coags    #DVT AND GI PROPHYLAXIS  Hold chemo PPX iso uremic platelet dysfunction  SCDs    #Code Status: Full code. Daughter is NOK per patient   Dr. Brewster and KELVIN Nixon spoke with daughter 12/16. Daughter would like to continue with scanning and testing for now.   Onc consulted today for second opinion. No further treatment from onc. If pt improves, can go to lis.     Assessment:  · Assessment	  66 YO F with history of uterine cancer (s/p hysterectomy, finished chemo 4 months ago) presenting with 3 weeks of painless jaundice, imaging concerning for malignant biliary obstruction.     #Neuro  A&Ox3  - Pt is now very lethargic i/s/o renal failure vs probable cholangiocarcinoma with metastasis     #CV  - Started levo as MAP pt was mapping 50's throughout the night on HD  - will trend MAP; new goal is SBP 90 and will begin midodrine to wean levophed    #PULMONARY   CT with consolidation in lingula and RML - atelectasis vs. pneumonia  CXR with right base atelectasis vs infiltrate   - Saturating well on room air    #INFECTIOUS DISEASE   - Afebrile, no leukocytosis, RVP negative however consolidation noted in lingula/RML atelectasis vs. 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 Per outpatient oncologist, had progressively worsening presumed uterine cancer. Outpatient, patient was evaled by oncologist with worsening abdominal mass and was recommended for CT. Received CT at hospital revealing new cholangiocarcinoma. Was planning hospice and chemo was stopped as uterine cancer was refractory to treatment  - Surgery on board  - GI to do MR MRCP today   - IR notes no indication for tube at this time   - Obtain tumor markers (CA 19-9, CEA, CA-125)  - Pain management as needed   - CTM liver function  - NPO     #RENAL  No history of kidney disease, admission SCr 7.29  - possible ATN vs bile cast nephropathy vs pre-renal etiology.   - Pt not receiving fluid over night, IVC 1.3- to get LR today   - Urgent HD 12/15, plan for urgent HD 12/16 as well   - Urine protein, urine creatinine, and urine sodium wnl   - Obtain renal US  - Strict I&O's     Hyperkalemia  - 5.8 on admission, for which she got IV insulin/D50, Lokelma, and calcium gluconate.   - Potassium 4.7 after HD   - Will continue to monitor post HD    Anion gap metabolic acidosis  pH 7.27, anion gap 23, bicarb 11  - AG 18 post HD   - Will continue to monitor post HD     UA with moderate bacteria, moderate epithelial cells   - Likely contaminated, obtain repeat  - repeat showed many bacteria and many large leuks   - culture pending     #Endocrine   Enlarged adrenal glands  - AM cortisol ordered     #Hematology   - Anemia to 11.8 -> 10.2   - Noted to have vaginal bleeding, recent epistaxis in setting of uremia. Improving s/p dialysis    INR 2.37 likely 2/2 uremic platelet dysfunction  - Monitor coags    #DVT AND GI PROPHYLAXIS  Hold chemo PPX iso uremic platelet dysfunction  SCDs    #Code Status: Full code. Daughter is NOK per patient   Dr. Brewster and KELVIN Nixon spoke with daughter 12/16. Daughter would like to continue with scanning and testing for now.   Onc consulted today for second opinion. No further treatment from onc. If pt improves, can go to lis.

## 2022-12-18 NOTE — PROGRESS NOTE ADULT - SUBJECTIVE AND OBJECTIVE BOX
Called for a cath consult by MICU team.  Patient is a 66 YO F with history of uterine cancer (s/p hysterectomy, finished chemo 4 months ago) presenting with 3 weeks of painless jaundice, imaging concerning for malignant biliary obstruction.  CT 12/15 c/f cholangiocarcinoma metastatic to liver, gallbladder abnormalities, pancreatic enlargement. Per outpatient oncologist, had progressively worsening presumed uterine cancer. Outpatient, patient was evaluated by oncologist with worsening abdominal mass and was recommended for CT. Received CT at hospital revealing new cholangiocarcinoma. Was planning hospice and chemo was stopped as uterine cancer was refractory to treatment.  ECG: ABBE I AVL, II.  NOT a candidate for urgent cath. No heparin gtt secondary to risk of bleeding.  Keep Hgb>10. Case discussed with Dr. Morris.

## 2022-12-18 NOTE — DIETITIAN INITIAL EVALUATION ADULT - PERSON TAUGHT/METHOD
discussed importance of small frequent meals to improve caloric intake.   discussed fortifying foods with additional protein and calories./verbal instruction/daughter instructed

## 2022-12-18 NOTE — DIETITIAN INITIAL EVALUATION ADULT - REASON FOR ADMISSION
Hyperkalemia  68 YO F with history of uterine cancer (s/p hysterectomy, finished chemo 4 months ago) presenting with 3 weeks of painless jaundice, imaging concerning for malignant biliary obstruction.

## 2022-12-18 NOTE — DIETITIAN INITIAL EVALUATION ADULT - OTHER INFO
A&Ox4; lethargic. Daughter at bedside. Pt with minimal PO intake. Amenable to Ensure supplements. No reported GI issues (nausea/vomiting/diarrhea/constipation.) Denies any chewing or swallowing difficulties at this time. NKFA. UBW estimated at 190 pounds 1 month ago. HIE/wt history data unavailable at this time.

## 2022-12-18 NOTE — PROGRESS NOTE ADULT - PROBLEM SELECTOR PLAN 1
Pt with severe DEMARCO in the setting of hypotension, decreased PO intake, and hyperbilirubinemia. Pt denies any prior known history of kidney disease. [facility] initially 7.29 on 12/15/22. No prior labs are available for review, exact duration of Scr elevation is unclear. UA shows hematuria, and proteinuria. Bilirubin level significantly elevated at 32.7. No evidence of obstruction on CT abdomen. 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 Pt received HD session on 12/15/22 for hyperkalemia resistant to medical therapy and worsening metabolic acidosis. Last HD done on 12/17/22. Spot urine TP/CR is elevated at 1.1. Renal US ruled out hydronephrosis. Labs reviewed. No urgent indication for HD today. Will assess need for HD daily. Monitor labs and urine output. Avoid nephrotoxins. Dose medications as per eGFR.

## 2022-12-18 NOTE — DIETITIAN INITIAL EVALUATION ADULT - ORAL INTAKE PTA/DIET HISTORY
Patient with poor PO intake for 1 month PTA. Daughter states pt c/o early satiety. Easier to drink fluids or small bites. Drinks Boost at home. Daughter states she started to lose weight about 1 month ago.

## 2022-12-18 NOTE — DIETITIAN INITIAL EVALUATION ADULT - ADD RECOMMEND
1. Encourage PO intake and honor food preferences as able (small frequent meals with supplements)   2. Continue to document PO in RN flow sheets and monitor weekly weights.

## 2022-12-18 NOTE — DIETITIAN NUTRITION RISK NOTIFICATION - TREATMENT: THE FOLLOWING DIET HAS BEEN RECOMMENDED
Diet, Regular:   Supplement Feeding Modality:  Oral  Ensure Enlive Cans or Servings Per Day:  1       Frequency:  Three Times a day (12-18-22 @ 11:42) [Active]

## 2022-12-18 NOTE — PROGRESS NOTE ADULT - ATTENDING COMMENTS
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 Off IV vasopressors. Increase midodrine to 30 q8h. Plan for eventual MRCP per GI.

## 2022-12-18 NOTE — DIETITIAN INITIAL EVALUATION ADULT - NS FNS DIET ORDER
Patient on unit for zometa infusion. IV accessed, NSS infusing. Patient states he tolerated last zometa infusion without any difficulty. Diet, Regular:   Supplement Feeding Modality:  Oral  Ensure Enlive Cans or Servings Per Day:  1       Frequency:  Three Times a day (12-18-22 @ 11:42)

## 2022-12-18 NOTE — PROGRESS NOTE ADULT - SUBJECTIVE AND OBJECTIVE BOX
INTERVAL HPI/OVERNIGHT EVENTS:    SUBJECTIVE: Patient seen and examined at bedside. No acute events overnight.      OBJECTIVE:    VITAL SIGNS:  ICU Vital Signs Last 24 Hrs  T(C): 36.6 (18 Dec 2022 07:32), Max: 37.6 (18 Dec 2022 04:00)  T(F): 97.9 (18 Dec 2022 07:32), Max: 99.6 (18 Dec 2022 04:00)  HR: 72 (18 Dec 2022 07:00) (71 - 93)  BP: 88/60 (18 Dec 2022 07:00) (74/56 - 131/73)  BP(mean): 68 (18 Dec 2022 07:00) (58 - 89)  ABP: --  ABP(mean): --  RR: 21 (18 Dec 2022 07:00) (18 - 37)  SpO2: 97% (18 Dec 2022 06:00) (93% - 99%)    O2 Parameters below as of 18 Dec 2022 07:00  Patient On (Oxygen Delivery Method): room air              12-17 @ 07:01  -  12-18 @ 07:00  --------------------------------------------------------  IN: 1060.1 mL / OUT: 400 mL / NET: 660.1 mL      CAPILLARY BLOOD GLUCOSE      POCT Blood Glucose.: 112 mg/dL (16 Dec 2022 17:24)      PHYSICAL EXAM:    HEENT: PERRLA, EOMI, no drainage or redness, scleral icterus   Neck: supple,  No JVD, Trachea midline  Respiratory: Breath Sounds equal & clear bilaterally to auscultation, no accessory muscle use noted  Cardiovascular: Regular rate, regular rhythm, normal S1, S2; no murmurs or rub  Gastrointestinal: abdominal mass noticeable on palpation RUQ  Extremities: KENNEDY x 4, no peripheral edema, no cyanosis, no clubbing   Vascular: Equal and normal pulses: 2+ peripheral pulses throughout  Neurological: A+O x 3 but lethargic; speech very soft  Musculoskeletal: No joint swelling or deformity; no limitation of movement  Skin: warm, dry, well perfused, no rashes    MEDICATIONS:  MEDICATIONS  (STANDING):  chlorhexidine 2% Cloths 1 Application(s) Topical <User Schedule>  dextrose 50% Injectable 25 Gram(s) IV Push once  dextrose 50% Injectable 12.5 Gram(s) IV Push once  dextrose 50% Injectable 25 Gram(s) IV Push once  dextrose Oral Gel 15 Gram(s) Oral once  glucagon  Injectable 1 milliGRAM(s) IntraMuscular once  midodrine 20 milliGRAM(s) Oral every 8 hours  piperacillin/tazobactam IVPB.. 3.375 Gram(s) IV Intermittent every 12 hours    MEDICATIONS  (PRN):      ALLERGIES:  Allergies    No Known Allergies    Intolerances        LABS:                        8.7    8.97  )-----------( 152      ( 18 Dec 2022 05:10 )             24.5     12-18    131<L>  |  95<L>  |  52<H>  ----------------------------<  83  4.4   |  21<L>  |  4.51<H>    Ca    8.9      18 Dec 2022 05:10  Phos  5.2     12-18  Mg     2.30     12-18    TPro  5.2<L>  /  Alb  2.3<L>  /  TBili  32.3<H>  /  DBili  x   /  AST  111<H>  /  ALT  34<H>  /  AlkPhos  409<H>  12-18    PT/INR - ( 16 Dec 2022 17:42 )   PT: 32.9 sec;   INR: 2.81 ratio         PTT - ( 18 Dec 2022 05:10 )  PTT:56.4 sec      RADIOLOGY & ADDITIONAL TESTS: Reviewed.

## 2022-12-18 NOTE — PROGRESS NOTE ADULT - ASSESSMENT
68 YO F with history of uterine cancer (s/p hysterectomy, finished chemo 4 months ago) presenting with 3 weeks of painless jaundice, imaging concerning for malignant biliary obstruction.     #Neuro  A&Ox3  - Pt is now very lethargic i/s/o renal failure vs probable cholangiocarcinoma with metastasis     #CV  - Started levo as MAP pt was mapping 50's throughout the night on HD  - will trend MAP; new goal is SBP 90 and will begin midodrine to wean levophed    #PULMONARY   CT with consolidation in lingula and RML - atelectasis vs. pneumonia  CXR with right base atelectasis vs infiltrate   - Saturating well on room air    #INFECTIOUS DISEASE   - Afebrile, no leukocytosis, RVP negative however consolidation noted in lingula/RML atelectasis vs. 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 Per outpatient oncologist, had progressively worsening presumed uterine cancer. Outpatient, patient was evaled by oncologist with worsening abdominal mass and was recommended for CT. Received CT at hospital revealing new cholangiocarcinoma. Was planning hospice and chemo was stopped as uterine cancer was refractory to treatment  - Surgery on board  - GI to do MR MRCP today   - IR notes no indication for tube at this time   - Obtain tumor markers (CA 19-9, CEA, CA-125)  - Pain management as needed   - CTM liver function  - NPO     #RENAL  No history of kidney disease, admission SCr 7.29  - possible ATN vs bile cast nephropathy vs pre-renal etiology.   - Pt not receiving fluid over night, IVC 1.3- to get LR today   - Urgent HD 12/15, plan for urgent HD 12/16 as well   - Urine protein, urine creatinine, and urine sodium wnl   - Obtain renal US  - Strict I&O's     Hyperkalemia  - 5.8 on admission, for which she got IV insulin/D50, Lokelma, and calcium gluconate.   - Potassium 4.7 after HD   - Will continue to monitor post HD    Anion gap metabolic acidosis  pH 7.27, anion gap 23, bicarb 11  - AG 18 post HD   - Will continue to monitor post HD     UA with moderate bacteria, moderate epithelial cells   - Likely contaminated, obtain repeat  - repeat showed many bacteria and many large leuks   - culture pending     #Endocrine   Enlarged adrenal glands  - AM cortisol ordered     #Hematology   - Anemia to 11.8 -> 10.2   - Noted to have vaginal bleeding, recent epistaxis in setting of uremia. Improving s/p dialysis    INR 2.37 likely 2/2 uremic platelet dysfunction  - Monitor coags    #DVT AND GI PROPHYLAXIS  Hold chemo PPX iso uremic platelet dysfunction  SCDs    #Code Status: Full code. Daughter is NOK per patient   Dr. Brewster and KELVIN Nixon spoke with daughter 12/16. Daughter would like to continue with scanning and testing for now.   Onc consulted today for second opinion. No further treatment from onc. If pt improves, can go to lis.    66 YO F with history of uterine cancer (s/p hysterectomy, finished chemo 4 months ago) presenting with 3 weeks of painless jaundice, imaging concerning for malignant biliary obstruction.     #Neuro  A&Ox3  - Pt is now very lethargic i/s/o renal failure vs probable cholangiocarcinoma with metastasis     #CV  - Now off levo, will increase midodrine to 30 q8h    #PULMONARY   CT with consolidation in lingula and RML - atelectasis vs. pneumonia  CXR with right base atelectasis vs infiltrate   - Saturating well on room air    #INFECTIOUS DISEASE   - Afebrile, no leukocytosis, RVP negative however consolidation noted in lingula/RML atelectasis vs. 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 Per outpatient oncologist, had progressively worsening presumed uterine cancer. Outpatient, patient was evaled by oncologist with worsening abdominal mass and was recommended for CT. Received CT at hospital revealing new cholangiocarcinoma. Was planning hospice and chemo was stopped as uterine cancer was refractory to treatment  - Surgery on board  - MR MRCP pending  - IR notes no indication for tube at this time   - Obtain tumor markers (CA 19-9, CEA, CA-125)  - Pain management as needed   - CTM liver function  - NPO     #RENAL  No history of kidney disease, admission SCr 7.29  - possible ATN vs bile cast nephropathy vs pre-renal etiology.   - Pt not receiving fluid over night, IVC 1.3- to get LR today   - Urgent HD 12/15, plan for urgent HD 12/16 as well   - Urine protein, urine creatinine, and urine sodium wnl   - Obtain renal US  - Strict I&O's     Hyperkalemia  - 5.8 on admission, for which she got IV insulin/D50, Lokelma, and calcium gluconate.   - Potassium 4.7 after HD   - Will continue to monitor post HD    Anion gap metabolic acidosis  pH 7.27, anion gap 23, bicarb 11  - AG 18 post HD   - Will continue to monitor post HD     UA with moderate bacteria, moderate epithelial cells   - Likely contaminated, obtain repeat  - repeat showed many bacteria and many large leuks   - culture pending     #Endocrine   Enlarged adrenal glands  - AM cortisol ordered     #Hematology   - Anemia to 11.8 -> 10.2   - Noted to have vaginal bleeding, recent epistaxis in setting of uremia. Improving s/p dialysis    INR 2.37 likely 2/2 uremic platelet dysfunction  - Monitor coags    #DVT AND GI PROPHYLAXIS  Hold chemo PPX iso uremic platelet dysfunction  SCDs    #Code Status: Full code. Daughter is NOK per patient   Dr. Brewster and KELVIN Nixon spoke with daughter 12/16. Daughter would like to continue with scanning and testing for now.   Onc consulted today for second opinion. No further treatment from onc. If pt improves, can go to lis.

## 2022-12-18 NOTE — DIETITIAN INITIAL EVALUATION ADULT - PERTINENT LABORATORY DATA
12-18    131<L>  |  95<L>  |  52<H>  ----------------------------<  83  4.4   |  21<L>  |  4.51<H>    Ca    8.9      18 Dec 2022 05:10  Phos  5.2     12-18  Mg     2.30     12-18    TPro  5.2<L>  /  Alb  2.3<L>  /  TBili  32.3<H>  /  DBili  x   /  AST  111<H>  /  ALT  34<H>  /  AlkPhos  409<H>  12-18  POCT Blood Glucose.: 97 mg/dL (12-18-22 @ 12:15)

## 2022-12-18 NOTE — DIETITIAN INITIAL EVALUATION ADULT - PERTINENT MEDS FT
MEDICATIONS  (STANDING):  chlorhexidine 2% Cloths 1 Application(s) Topical <User Schedule>  dextrose 50% Injectable 25 Gram(s) IV Push once  dextrose 50% Injectable 12.5 Gram(s) IV Push once  dextrose 50% Injectable 25 Gram(s) IV Push once  dextrose Oral Gel 15 Gram(s) Oral once  glucagon  Injectable 1 milliGRAM(s) IntraMuscular once  midodrine 30 milliGRAM(s) Oral every 8 hours  piperacillin/tazobactam IVPB.. 3.375 Gram(s) IV Intermittent every 12 hours    MEDICATIONS  (PRN):

## 2022-12-19 NOTE — CHART NOTE - NSCHARTNOTEFT_GEN_A_CORE
67y Female with PMH uterine ca (s/p hysterectomy, chemo/RT, in remission as of 4 months ago) presenting with 2 weeks painless jaundice and progressive failure to thrive found to have irregular appearing gallbladder with intrahepatic biliary ductal dilation and multiple liver lesions concerning for metastatic cholangiocarcinoma.    -- Case discussed with IR attending Dr. Pierre.  -- Prior imaging reviewed and given that prior CT is non-contrast, there is limited evaluation of the bilary system.  -- Will need contrast enhanced imaging to further evaluate the biliary system prior to performing any procedure, preferably MRI/MRCP. In the interim, recommend obtaining RUQ US.  -- Will determine indication for cholecystostomy tube placement pending imaging.    Thank You for the consultation.      Marley Mendoza D.O.  Radiology Resident (PGY-3)   Available on [employer] TEAMS (preferred)  [facility] Pager #[phone] 67y Female with PMH uterine ca (s/p hysterectomy, chemo/RT, in remission as of 4 months ago) presenting with 2 weeks painless jaundice and progressive failure to thrive found to have irregular appearing gallbladder with intrahepatic biliary ductal dilation and multiple liver lesions concerning for metastatic cholangiocarcinoma.    -- Case discussed with IR attending Dr. Pierre and MICU fellow Rodo.  -- Prior imaging reviewed and given that prior CT is non-contrast, there is limited evaluation of the bilary system.  -- Will need contrast enhanced imaging to further evaluate the biliary system prior to performing any procedure, preferably MRI/MRCP. In the interim, recommend obtaining RUQ US.  -- Will determine indication for cholecystostomy tube placement pending imaging.    Thank You for the consultation.      Marley Mendoza D.O.  Radiology Resident (PGY-3)   Available on [employer] TEAMS (preferred)  [facility] Pager #[phone]

## 2022-12-19 NOTE — CONSULT NOTE ADULT - ASSESSMENT
66 yo F PMH uterine ca (s/p hysterectomy, chemo/RT, in remission with latest med onc visit about 4 months ago in Sep 2022), BIBEMS to [place], p/w 2 weeks painless jaundice and progressive failure to thrive, 1 day epistaxis and vaginal bleeding; found to have likely metastatic cholangiocarcinoma on CT abd/p without contrast  and acute renal failure (K 5.8, Cr 7.29); transferred to [facility] for further management. -      #Abnormal appearing gallbladder, baldomero hepatis, intrahepatic ductal dilation and multiple lesions on CT abd/p without contrast suspecting Cholangiocarcinoma vs metastatic cancer (history of Uterine CA)  # #Hyperbilirubinemia likely due to biliary obstruction by malignancy:   -on admission,  12/19/22 coagulation panel showing INR 2.08; Tbil 41; ALT 47; ; .   -12/15/22 CT abd/p w/o contrast showing limited study; Abnormal appearing gallbladder, baldomero hepatis, with marked intrahepatic ductal dilatation and multiple lesions in the liver. Thickening of the distal stomach, proximal duodenum, and enlargement of the pancreatic head and uncinate process. Findings are concerning for cholangiocarcinoma with metastatic disease to the liver. Concern for portal venous thrombosis and resultant hyperdense portal veins. Further evaluation with contrast enhanced MRI and MRCP examination is recommended.  Enlarged bilateral adrenal glands. Small ascites.      Recommendations  -currently off AC due to vaginal bleeding/epistaxis and abnormal INR and aPTT.   -Agree with GI rec for MR/ MRCP;  and potential ERCP if conditions improved   -f/u IR for biliary decompression and biopsy   -f/u renal for DEMARCO, s/p urgent hemodialysis on 12/15 and 12/19. As per renal note, pt need HD cath exchange before next HD due to HD cath issues (slow flow).   -monitor CBC, CMP electrolytes/Tbil, coagulation panel including fibrinogen, D-dimer,   -Check    -the rest of care per MICU team   -discussed with her daughter America Quinonez ([phone]), and her sister Andrew Hernandez ([phone]) at the bedside about current medical conditions, CT scan result, and further evaluation/treatment plan. They agree with the plan.   -Request Medical record including image results form her Med onc Dr. Waggoner's office.   -Onc team will f/u     Doyle Beauchamp PGY5   Hem & Onc fellow

## 2022-12-19 NOTE — CONSULT NOTE ADULT - REASON FOR ADMISSION
malignant obstruction, renal and liver failure

## 2022-12-19 NOTE — CHART NOTE - NSCHARTNOTEFT_GEN_A_CORE
MICU Transfer Note    Transfer from: MICU  Transfer to:  (  ) Medicine    (  ) Telemetry    (  ) RCU    (  ) Palliative    (  ) Stroke Unit    (  ) _______________  Accepting physican:      MICU COURSE:          ASSESSMENT & PLAN:         For Follow-Up:  Vital Signs Last 24 Hrs  T(C): 36.7 (19 Dec 2022 09:54), Max: 37.1 (19 Dec 2022 07:05)  T(F): 98.1 (19 Dec 2022 09:54), Max: 98.7 (19 Dec 2022 07:05)  HR: 76 (19 Dec 2022 11:00) (45 - 97)  BP: 122/62 (19 Dec 2022 10:00) (73/45 - 129/66)  BP(mean): 79 (19 Dec 2022 10:00) (54 - 116)  RR: 17 (19 Dec 2022 11:00) (0 - 30)  SpO2: 96% (18 Dec 2022 19:00) (91% - 97%)    Parameters below as of 18 Dec 2022 19:00  Patient On (Oxygen Delivery Method): nasal cannula  O2 Flow (L/min): 4    I&O's Summary    18 Dec 2022 07:01  -  19 Dec 2022 07:00  --------------------------------------------------------  IN: 707.2 mL / OUT: 345 mL / NET: 362.2 mL    19 Dec 2022 07:01  -  19 Dec 2022 14:28  --------------------------------------------------------  IN: 400 mL / OUT: 137 mL / NET: 263 mL                  MEDICATIONS  (STANDING):  chlorhexidine 2% Cloths 1 Application(s) Topical <User Schedule>  dextrose 50% Injectable 25 Gram(s) IV Push once  dextrose 50% Injectable 12.5 Gram(s) IV Push once  dextrose 50% Injectable 25 Gram(s) IV Push once  dextrose Oral Gel 15 Gram(s) Oral once  glucagon  Injectable 1 milliGRAM(s) IntraMuscular once  midodrine 30 milliGRAM(s) Oral every 8 hours  norepinephrine Infusion 0.05 MICROgram(s)/kG/Min (7.16 mL/Hr) IV Continuous <Continuous>  phytonadione  IVPB 10 milliGRAM(s) IV Intermittent once  piperacillin/tazobactam IVPB.. 3.375 Gram(s) IV Intermittent every 12 hours    MEDICATIONS  (PRN):        LABS                                            9.7                   Neurophils% (auto):   77.2   (12-19 @ 02:30):    12.69)-----------(166          Lymphocytes% (auto):  3.5                                           28.4                   Eosinphils% (auto):   2.6      Manual%: Neutrophils x    ; Lymphocytes x    ; Eosinophils x    ; Bands%: x    ; Blasts x                                    132    |  96     |  65                  Calcium: 9.1   / iCa: x      (12-19 @ 02:30)    ----------------------------<  94        Magnesium: 2.30                             4.8     |  16     |  5.54             Phosphorous: 6.8      TPro  5.3    /  Alb  2.0    /  TBili  33.3   /  DBili  x      /  AST  444    /  ALT  109    /  AlkPhos  392    19 Dec 2022 02:30    ( 12-19 @ 02:30 )   PT: 71.0 sec;   INR: 6.01 ratio  aPTT: 68.8 sec

## 2022-12-19 NOTE — CONSULT NOTE ADULT - SUBJECTIVE AND OBJECTIVE BOX
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     Patient was at usual state of health performing IADLs until 2 weeks prior to admission when she developed scleral icterus, pruritus, intolerance of solid food, and reduced PO intake. She endorsed a feeling of prolonged fullness with meals that would eventually on occasion prompt her to induce vomiting. Last had broth on  lunch 1 day prior to admission; had tea this morning. Also endorsed HERNANDEZ, "puffy" abdomen, light colored stools (at bedside), +dark urine. No fevers, chills, confusion, tremors, abdominal pain, sick contacts, bloody vomit/bloody stool, petechial rash/bruising/other bleeding.    Notably K 5.9 at OSH/ED, received calcium gluconate and Lokelma.       PERTINENT PM/SXH:   Uterine cancer      S/P hysterectomy      FAMILY HISTORY:    ------------------------------------------------------------------------------------------------------------  ITEMS NOT CHECKED ARE NOT PRESENT    SOCIAL HISTORY:   Living Situation: [ ]Home  [ ]Long term care  [ ]Rehab [ ]Other  Support:     Substance hx:  [ ]   Tobacco hx:  [ ]   Alcohol hx: [ ]   Family Hx substance abuse [ ]yes [ ]no    Holiness/Spirituality:  PCSSQ[Palliative Care Spiritual Screening Question]   Severity (0-10): 0  Score of 4 or > indicate consideration of Chaplaincy referral.  Chaplaincy Referral: [ ] yes [X ] refused [ ] following    Caregiver [place]? : [ ] yes [X ] no [ ] Deferred [ ] Declined               Social work referral [ ] Patient & Family Centered Care Referral [ ]    Anticipatory Grief present?:  [ ] yes [X ] no  [ ] Deferred                    Social work referral [ ] Patient & Family Centered Care Referral [ ]    ------------------------------------------------------------------------------------------------------------    PRESENT SYMPTOMS:  [ ] No     [ ] Yes   Source if other than patient:   [ ]Family   [ ]Team     [ ] Unable to self-report      [ ] CPOT (ICU)     [ ] PAINADs     [ ] RDOS    PAIN:   If blank, patient unable to specify   [ ]yes [ ]no  Location -                    Aggravating factors -  Quality -  Radiation -  Timing-  Pain at most severe level (0-10 scale):  Pain at minimal acceptable level (0-10 scale):     Dyspnea:                           [ ]Mild [ ]Moderate [ ]Severe  Anxiety:                             [ ]Mild [ ]Moderate [ ]Severe  Fatigue:                             [ ]Mild [ ]Moderate [ ]Severe  Nausea:                             [ ]Mild [ ]Moderate [ ]Severe  Loss of appetite:              [ ]Mild [ ]Moderate [ ]Severe  Constipation:                    [ ]Mild [ ]Moderate [ ]Severe    Other Symptoms:  [X ]All other review of systems negative     Home Medications for symptoms if any:  I-Stop Reference No:    ------------------------------------------------------------------------------------------------------------    FUNCTIONAL STATUS:     Baseline ADL (prior to admission):  [ ] Independent  [ ] Moderate Assistance [ ] Dependent  Palliative Performance Score:     [ ]PPSV2 < or = to 30%     NUTRITIONAL STATUS:     Protein Calorie Malnutrition Present:   [ ]mild   [ ]moderate   [ ]severe   [ ]poor nutritional intake   [ ]artificial nutrition      Weight:   [ ]underweight (BMI 18.5 or less)   [ ]morbid obesity (BMI 30 or higher)   [ ]anasarca  [ ]significant weight loss     Height (cm): 157.5 (12-15-22 @ 21:00), 170.2 (22 @ 22:28)  Weight (kg): 76.4 (12-15-22 @ 21:00), 72.6 (22 @ 22:28)  BMI (kg/m2): 30.8 (12-15-22 @ 21:00), 25.1 (22 @ 22:28)    ------------------------------------------------------------------------------------------------------------    PRIOR ADVANCE DIRECTIVES:    [ ] DNR/MOLST    [ ] Living Will    [ ] Health Care Proxy(s)    DECISION MAKER(s):  [ ] Patient    [ ] Surrogate(s)  [ ]  HCP   [ ] Guardian             ------------------------------------------------------------------------------------------------------------  PHYSICAL EXAM:  Vital Signs Last 24 Hrs  T(C): 36.7 (19 Dec 2022 09:54), Max: 37.1 (19 Dec 2022 07:05)  T(F): 98.1 (19 Dec 2022 09:54), Max: 98.7 (19 Dec 2022 07:05)  HR: 79 (19 Dec 2022 09:54) (45 - 97)  BP: 129/66 (19 Dec 2022 09:54) (73/45 - 129/66)  BP(mean): 76 (19 Dec 2022 07:00) (54 - 116)  RR: 20 (19 Dec 2022 09:54) (0 - 30)  SpO2: 96% (18 Dec 2022 19:00) (91% - 97%)    Parameters below as of 18 Dec 2022 19:00  Patient On (Oxygen Delivery Method): nasal cannula  O2 Flow (L/min): 4   I&O's Summary    18 Dec 2022 07:01  -  19 Dec 2022 07:00  --------------------------------------------------------  IN: 707.2 mL / OUT: 345 mL / NET: 362.2 mL    19 Dec 2022 07:01  -  19 Dec 2022 09:57  --------------------------------------------------------  IN: 400 mL / OUT: 137 mL / NET: 263 mL        GENERAL:  [ ]Cachexia  [ ] Frail  [ ]Awake  [ ]Oriented x   [ ]Lethargic  [ ]Unarousable  [ ]Verbal  [ ]Non-Verbal    BEHAVIORAL: [ ] Anxiety  [ ] Delirium [ ] Agitation [ ] Other    HEENT: [ ]Normal   [ ]Dry mouth   [ ]ET Tube/Trach  [ ]Oral lesions    PULMONARY:   [ ]Clear [ ]Tachypnea  [ ]Audible excessive secretions   [ ]Rhonchi        [ ]Right [ ]Left [ ]Bilateral  [ ]Crackles        [ ]Right [ ]Left [ ]Bilateral  [ ]Wheezing     [ ]Right [ ]Left [ ]Bilateral  [ ]Diminished breath sounds [ ]right [ ]left [ ]bilateral    CARDIOVASCULAR:    [ ]Regular [ ]Irregular [ ]Tachy  [ ]Manuel [ ]Murmur [ ]Other    GASTROINTESTINAL:  [ ]Soft  [ ]Distended   [ ]+BS  [ ]Non tender [ ]Tender  [ ]Other [ ]PEG [ ]OGT/ NGT      GENITOURINARY:  [ ]Normal [ ] Incontinent   [ ]Oliguria/Anuria   [ ]Valle    MUSCULOSKELETAL:   [ ]Normal   [ ]Weakness  [ ]Bed/Wheelchair bound [ ]Edema    NEUROLOGIC:   [ ]No focal deficits  [ ]Cognitive impairment  [ ]Dysphagia [ ]Dysarthria [ ]Paresis [ ]Other     SKIN:   [ ]Normal  [ ]Rash  [ ]Other  [ ]Pressure ulcer(s)       Present on admission [ ]y [ ]n    ------------------------------------------------------------------------------------------------------------    LABS:                        9.7    12.69 )-----------( 166      ( 19 Dec 2022 02:30 )             28.4   12    132<L>  |  96<L>  |  65<H>  ----------------------------<  94  4.8   |  16<L>  |  5.54<H>    Ca    9.1      19 Dec 2022 02:30  Phos  6.8       Mg     2.30         TPro  5.3<L>  /  Alb  2.0<L>  /  TBili  33.3<H>  /  DBili  x   /  AST  444<H>  /  ALT  109<H>  /  AlkPhos  392<H>    PT/INR - ( 19 Dec 2022 02:30 )   PT: 71.0 sec;   INR: 6.01 ratio         PTT - ( 19 Dec 2022 02:30 )  PTT:68.8 sec    Urinalysis Basic - ( 18 Dec 2022 10:39 )    Color: Katina / Appearance: Turbid / S.021 / pH: x  Gluc: x / Ketone: Trace  / Bili: Large / Urobili: <2 mg/dL   Blood: x / Protein: 100 mg/dL / Nitrite: Negative   Leuk Esterase: Negative / RBC: 10 /HPF / WBC 2 /HPF   Sq Epi: x / Non Sq Epi: 1 /HPF / Bacteria: Few    ------------------------------------------------------------------------------------------------------------  RADIOLOGY & ADDITIONAL STUDIES:          ------------------------------------------------------------------------------------------------------------    ALLERGIES:  Allergies    No Known Allergies    Intolerances    MEDICATIONS  (STANDING):  chlorhexidine 2% Cloths 1 Application(s) Topical <User Schedule>  dextrose 50% Injectable 25 Gram(s) IV Push once  dextrose 50% Injectable 12.5 Gram(s) IV Push once  dextrose 50% Injectable 25 Gram(s) IV Push once  dextrose Oral Gel 15 Gram(s) Oral once  glucagon  Injectable 1 milliGRAM(s) IntraMuscular once  midodrine 30 milliGRAM(s) Oral every 8 hours  norepinephrine Infusion 0.05 MICROgram(s)/kG/Min (7.16 mL/Hr) IV Continuous <Continuous>  piperacillin/tazobactam IVPB.. 3.375 Gram(s) IV Intermittent every 12 hours    MEDICATIONS  (PRN):    ------------------------------------------------------------------------------------------------------------    CRITICAL CARE:  [ ]Shock Present  [ ]Septic [ ]Cardiogenic [ ]Neurologic [ ]Hypovolemic [ ] Undifferentiated/Mixed   [ ]Vasopressors [ ]Inotropes     [ ]Respiratory failure present : [ ]Acute  [ ]Chronic [ ]Hypoxic  [ ]Hypercarbic   [ ]Mechanical ventilation   [ ]Non-invasive ventilatory support   [ ]High flow      [ ]Other organ failure     Other REFERRALS:  [ ]Hospice  [ ]Child Life  [ ]Social Work  [ ]Case management [ ]Holistic Therapy    HPI:  Patient is a 67 F PMH uterine ca (s/p hysterectomy, chemo/RT, in remission as of 4 months ago), BIBEMS to [place], p/w 2 weeks painless jaundice and progressive failure to thrive, 1 day epistaxis and vaginal bleeding; found to have likely new metastatic cholangiocarcinoma and acute renal failure (K 5.8, Cr 7.29); transferred to [facility] ED for further management.     On HD, hypotensive.     PERTINENT PM/SXH:   Uterine cancer      S/P hysterectomy      FAMILY HISTORY:    ------------------------------------------------------------------------------------------------------------  ITEMS NOT CHECKED ARE NOT PRESENT    SOCIAL HISTORY:   Living Situation: [ ]Home  [ ]Long term care  [ ]Rehab [ ]Other  Support:     Substance hx:  [ ]   Tobacco hx:  [ ]   Alcohol hx: [ ]   Family Hx substance abuse [ ]yes [ ]no    Zoroastrian/Spirituality:  PCSSQ[Palliative Care Spiritual Screening Question]   Severity (0-10): 0  Score of 4 or > indicate consideration of Chaplaincy referral.  Chaplaincy Referral: [ ] yes [X ] refused [ ] following    Caregiver [place]? : [ ] yes [X ] no [ ] Deferred [ ] Declined               Social work referral [ ] Patient & Family Centered Care Referral [ ]    Anticipatory Grief present?:  [ ] yes [X ] no  [ ] Deferred                    Social work referral [ ] Patient & Family Centered Care Referral [ ]    ------------------------------------------------------------------------------------------------------------    PRESENT SYMPTOMS:  [ ] No     [ ] Yes   Source if other than patient:   [ ]Family   [ ]Team     [ ] Unable to self-report      [ ] CPOT (ICU)     [ ] PAINADs     [ ] RDOS    PAIN:   If blank, patient unable to specify   [ ]yes [ ]no  Location -                    Aggravating factors -  Quality -  Radiation -  Timing-  Pain at most severe level (0-10 scale):  Pain at minimal acceptable level (0-10 scale):     Dyspnea:                           [ ]Mild [ ]Moderate [ ]Severe  Anxiety:                             [ ]Mild [ ]Moderate [ ]Severe  Fatigue:                             [ ]Mild [ ]Moderate [ ]Severe  Nausea:                             [ ]Mild [ ]Moderate [ ]Severe  Loss of appetite:              [ ]Mild [ ]Moderate [ ]Severe  Constipation:                    [ ]Mild [ ]Moderate [ ]Severe    Other Symptoms:  [X ]All other review of systems negative     Home Medications for symptoms if any:  I-Stop Reference No:    ------------------------------------------------------------------------------------------------------------    FUNCTIONAL STATUS:     Baseline ADL (prior to admission):  [ ] Independent  [ ] Moderate Assistance [ ] Dependent  Palliative Performance Score:     [ ]PPSV2 < or = to 30%     NUTRITIONAL STATUS:     Protein Calorie Malnutrition Present:   [ ]mild   [ ]moderate   [ ]severe   [ ]poor nutritional intake   [ ]artificial nutrition      Weight:   [ ]underweight (BMI 18.5 or less)   [ ]morbid obesity (BMI 30 or higher)   [ ]anasarca  [ ]significant weight loss     Height (cm): 157.5 (12-15-22 @ 21:00), 170.2 (22 @ 22:28)  Weight (kg): 76.4 (12-15-22 @ 21:00), 72.6 (22 @ 22:28)  BMI (kg/m2): 30.8 (12-15-22 @ 21:00), 25.1 (22 @ 22:28)    ------------------------------------------------------------------------------------------------------------    PRIOR ADVANCE DIRECTIVES:    [ ] DNR/MOLST    [ ] Living Will    [ ] Health Care Proxy(s)    DECISION MAKER(s):  [ ] Patient    [ ] Surrogate(s)  [ ]  HCP   [ ] Guardian             ------------------------------------------------------------------------------------------------------------  PHYSICAL EXAM:  Vital Signs Last 24 Hrs  T(C): 36.7 (19 Dec 2022 09:54), Max: 37.1 (19 Dec 2022 07:05)  T(F): 98.1 (19 Dec 2022 09:54), Max: 98.7 (19 Dec 2022 07:05)  HR: 79 (19 Dec 2022 09:54) (45 - 97)  BP: 129/66 (19 Dec 2022 09:54) (73/45 - 129/66)  BP(mean): 76 (19 Dec 2022 07:00) (54 - 116)  RR: 20 (19 Dec 2022 09:54) (0 - 30)  SpO2: 96% (18 Dec 2022 19:00) (91% - 97%)    Parameters below as of 18 Dec 2022 19:00  Patient On (Oxygen Delivery Method): nasal cannula  O2 Flow (L/min): 4   I&O's Summary    18 Dec 2022 07:01  -  19 Dec 2022 07:00  --------------------------------------------------------  IN: 707.2 mL / OUT: 345 mL / NET: 362.2 mL    19 Dec 2022 07:01  -  19 Dec 2022 09:57  --------------------------------------------------------  IN: 400 mL / OUT: 137 mL / NET: 263 mL        GENERAL:  [ ]Cachexia  [ ] Frail  [ ]Awake  [ ]Oriented x   [ ]Lethargic  [ ]Unarousable  [ ]Verbal  [ ]Non-Verbal    BEHAVIORAL: [ ] Anxiety  [ ] Delirium [ ] Agitation [ ] Other    HEENT: [ ]Normal   [ ]Dry mouth   [ ]ET Tube/Trach  [ ]Oral lesions    PULMONARY:   [ ]Clear [ ]Tachypnea  [ ]Audible excessive secretions   [ ]Rhonchi        [ ]Right [ ]Left [ ]Bilateral  [ ]Crackles        [ ]Right [ ]Left [ ]Bilateral  [ ]Wheezing     [ ]Right [ ]Left [ ]Bilateral  [ ]Diminished breath sounds [ ]right [ ]left [ ]bilateral    CARDIOVASCULAR:    [ ]Regular [ ]Irregular [ ]Tachy  [ ]Manuel [ ]Murmur [ ]Other    GASTROINTESTINAL:  [ ]Soft  [ ]Distended   [ ]+BS  [ ]Non tender [ ]Tender  [ ]Other [ ]PEG [ ]OGT/ NGT      GENITOURINARY:  [ ]Normal [ ] Incontinent   [ ]Oliguria/Anuria   [ ]Valle    MUSCULOSKELETAL:   [ ]Normal   [ ]Weakness  [ ]Bed/Wheelchair bound [ ]Edema    NEUROLOGIC:   [ ]No focal deficits  [ ]Cognitive impairment  [ ]Dysphagia [ ]Dysarthria [ ]Paresis [ ]Other     SKIN:   [ ]Normal  [ ]Rash  [ ]Other  [ ]Pressure ulcer(s)       Present on admission [ ]y [ ]n    ------------------------------------------------------------------------------------------------------------    LABS:                        9.7    12.69 )-----------( 166      ( 19 Dec 2022 02:30 )             28.4   12    132<L>  |  96<L>  |  65<H>  ----------------------------<  94  4.8   |  16<L>  |  5.54<H>    Ca    9.1      19 Dec 2022 02:30  Phos  6.8       Mg     2.30         TPro  5.3<L>  /  Alb  2.0<L>  /  TBili  33.3<H>  /  DBili  x   /  AST  444<H>  /  ALT  109<H>  /  AlkPhos  392<H>    PT/INR - ( 19 Dec 2022 02:30 )   PT: 71.0 sec;   INR: 6.01 ratio         PTT - ( 19 Dec 2022 02:30 )  PTT:68.8 sec    Urinalysis Basic - ( 18 Dec 2022 10:39 )    Color: Katina / Appearance: Turbid / S.021 / pH: x  Gluc: x / Ketone: Trace  / Bili: Large / Urobili: <2 mg/dL   Blood: x / Protein: 100 mg/dL / Nitrite: Negative   Leuk Esterase: Negative / RBC: 10 /HPF / WBC 2 /HPF   Sq Epi: x / Non Sq Epi: 1 /HPF / Bacteria: Few    ------------------------------------------------------------------------------------------------------------  RADIOLOGY & ADDITIONAL STUDIES:          ------------------------------------------------------------------------------------------------------------    ALLERGIES:  Allergies    No Known Allergies    Intolerances    MEDICATIONS  (STANDING):  chlorhexidine 2% Cloths 1 Application(s) Topical <User Schedule>  dextrose 50% Injectable 25 Gram(s) IV Push once  dextrose 50% Injectable 12.5 Gram(s) IV Push once  dextrose 50% Injectable 25 Gram(s) IV Push once  dextrose Oral Gel 15 Gram(s) Oral once  glucagon  Injectable 1 milliGRAM(s) IntraMuscular once  midodrine 30 milliGRAM(s) Oral every 8 hours  norepinephrine Infusion 0.05 MICROgram(s)/kG/Min (7.16 mL/Hr) IV Continuous <Continuous>  piperacillin/tazobactam IVPB.. 3.375 Gram(s) IV Intermittent every 12 hours    MEDICATIONS  (PRN):    ------------------------------------------------------------------------------------------------------------    CRITICAL CARE:  [ ]Shock Present  [ ]Septic [ ]Cardiogenic [ ]Neurologic [ ]Hypovolemic [ ] Undifferentiated/Mixed   [ ]Vasopressors [ ]Inotropes     [ ]Respiratory failure present : [ ]Acute  [ ]Chronic [ ]Hypoxic  [ ]Hypercarbic   [ ]Mechanical ventilation   [ ]Non-invasive ventilatory support   [ ]High flow      [ ]Other organ failure     Other REFERRALS:  [ ]Hospice  [ ]Child Life  [ ]Social Work  [ ]Case management [ ]Holistic Therapy    HPI:  Patient is a 67 F PMH uterine ca (s/p hysterectomy, chemo/RT, in remission as of 4 months ago), BIBEMS to [place], p/w 2 weeks painless jaundice and progressive failure to thrive, 1 day epistaxis and vaginal bleeding; found to have likely new metastatic cholangiocarcinoma and acute renal failure (K 5.8, Cr 7.29); transferred to [facility] ED for further management. Patient is elevated bilirubin >40, altered mental status, admitted to MICU. Patient was also started on HD. Palliative consulted for GOC.     Patient was seen this AM, lethargic, breathing on nasal cannula. Sister Edrisse at bedside.       PERTINENT PM/SXH:   Uterine cancer      S/P hysterectomy      FAMILY HISTORY:  Sister with breast cancer     ------------------------------------------------------------------------------------------------------------  ITEMS NOT CHECKED ARE NOT PRESENT    SOCIAL HISTORY:   Living Situation: [X ]Home  [ ]Long term care  [ ]Rehab [ ]Other  Support: Has two adult daughters. Legally  but  from  (he lives in [place]- not involved). Has sisters     Substance hx:  [ ]   Tobacco hx:  [ ]   Alcohol hx: [ ]   Family Hx substance abuse [ ]yes [ ]no    Yazidism/Spirituality:  PCSSQ[Palliative Care Spiritual Screening Question]   Severity (0-10): 0  Score of 4 or > indicate consideration of Chaplaincy referral.  Chaplaincy Referral: [ ] yes [X ] refused [ ] following    Caregiver [place]? : [ ] yes [X ] no [ ] Deferred [ ] Declined               Social work referral [ ] Patient & Family Centered Care Referral [ ]    Anticipatory Grief present?:  [ ] yes [X ] no  [ ] Deferred                    Social work referral [ ] Patient & Family Centered Care Referral [ ]    ------------------------------------------------------------------------------------------------------------    PRESENT SYMPTOMS:  [X ] No     [ ] Yes   Source if other than patient:   [ ]Family   [ ]Team     [ ] Unable to self-report      [ ] CPOT (ICU)     [ ] PAINADs     [ ] RDOS    PAIN:   If blank, patient unable to specify   [ ]yes [ ]no  Location -                    Aggravating factors -  Quality -  Radiation -  Timing-  Pain at most severe level (0-10 scale):  Pain at minimal acceptable level (0-10 scale):     Dyspnea:                           [ ]Mild [ ]Moderate [ ]Severe  Anxiety:                             [ ]Mild [ ]Moderate [ ]Severe  Fatigue:                             [ ]Mild [ ]Moderate [ ]Severe  Nausea:                             [ ]Mild [ ]Moderate [ ]Severe  Loss of appetite:              [ ]Mild [ ]Moderate [ ]Severe  Constipation:                    [ ]Mild [ ]Moderate [ ]Severe    Other Symptoms:  [X ]All other review of systems negative     Home Medications for symptoms if any:  I-Stop Reference No:    ------------------------------------------------------------------------------------------------------------    FUNCTIONAL STATUS:     Baseline ADL (prior to admission):  [X ] Independent  [ ] Moderate Assistance [ ] Dependent  Palliative Performance Score: 70%     [ ]PPSV2 < or = to 30%     NUTRITIONAL STATUS:     Protein Calorie Malnutrition Present:   [ ]mild   [ ]moderate   [ ]severe   [ ]poor nutritional intake   [ ]artificial nutrition      Weight:   [ ]underweight (BMI 18.5 or less)   [ ]morbid obesity (BMI 30 or higher)   [ ]anasarca  [ ]significant weight loss     Height (cm): 157.5 (12-15-22 @ 21:00), 170.2 (22 @ 22:28)  Weight (kg): 76.4 (12-15-22 @ 21:00), 72.6 (22 @ 22:28)  BMI (kg/m2): 30.8 (12-15-22 @ 21:00), 25.1 (22 @ 22:28)    ------------------------------------------------------------------------------------------------------------    PRIOR ADVANCE DIRECTIVES:    [ ] DNR/MOLST    [ ] Living Will    [ ] Health Care Proxy(s)    DECISION MAKER(s):  [ ] Patient    [ ] Surrogate(s)  [ ] HCP   [ ] Guardian             ------------------------------------------------------------------------------------------------------------  PHYSICAL EXAM:  Vital Signs Last 24 Hrs  T(C): 36.7 (19 Dec 2022 09:54), Max: 37.1 (19 Dec 2022 07:05)  T(F): 98.1 (19 Dec 2022 09:54), Max: 98.7 (19 Dec 2022 07:05)  HR: 79 (19 Dec 2022 09:54) (45 - 97)  BP: 129/66 (19 Dec 2022 09:54) (73/45 - 129/66)  BP(mean): 76 (19 Dec 2022 07:00) (54 - 116)  RR: 20 (19 Dec 2022 09:54) (0 - 30)  SpO2: 96% (18 Dec 2022 19:00) (91% - 97%)    Parameters below as of 18 Dec 2022 19:00  Patient On (Oxygen Delivery Method): nasal cannula  O2 Flow (L/min): 4   I&O's Summary    18 Dec 2022 07:01  -  19 Dec 2022 07:00  --------------------------------------------------------  IN: 707.2 mL / OUT: 345 mL / NET: 362.2 mL    19 Dec 2022 07:01  -  19 Dec 2022 09:57  --------------------------------------------------------  IN: 400 mL / OUT: 137 mL / NET: 263 mL        GENERAL:  [ ]Cachexia  [X ] Frail  [ ]Awake  [ ]Oriented x   [X ]Lethargic  [ ]Unarousable  [ ]Verbal  [ ]Non-Verbal    BEHAVIORAL: [ ] Anxiety  [ ] Delirium [ ] Agitation [ ] Other    HEENT: [ ]Normal   [ ]Dry mouth   [ ]ET Tube/Trach  [ ]Oral lesions    PULMONARY:   [X ]Clear [ ]Tachypnea  [ ]Audible excessive secretions   [ ]Rhonchi        [ ]Right [ ]Left [ ]Bilateral  [ ]Crackles        [ ]Right [ ]Left [ ]Bilateral  [ ]Wheezing     [ ]Right [ ]Left [ ]Bilateral  [ ]Diminished breath sounds [ ]right [ ]left [ ]bilateral    CARDIOVASCULAR:    [ ]Regular [ ]Irregular [X ]Tachy  [ ]Manuel [ ]Murmur [ ]Other    GASTROINTESTINAL:  [X ]Soft  [ ]Distended   [X ]+BS  [X ]Non tender [ ]Tender  [ ]Other [ ]PEG [ ]OGT/ NGT      GENITOURINARY:  [ ]Normal [ ] Incontinent   [ ]Oliguria/Anuria   [X ]Valle    MUSCULOSKELETAL:   [ ]Normal   [X ]Weakness  [ ]Bed/Wheelchair bound [ ]Edema    NEUROLOGIC:   [X ]No focal deficits  [ ]Cognitive impairment  [ ]Dysphagia [ ]Dysarthria [ ]Paresis [ ]Other     SKIN:   [X ]Normal  [ ]Rash  [ ]Other  [ ]Pressure ulcer(s)       Present on admission [ ]y [ ]n    ------------------------------------------------------------------------------------------------------------    LABS:                        9.7    12.69 )-----------( 166      ( 19 Dec 2022 02:30 )             28.4   12    132<L>  |  96<L>  |  65<H>  ----------------------------<  94  4.8   |  16<L>  |  5.54<H>    Ca    9.1      19 Dec 2022 02:30  Phos  6.8       Mg     2.30         TPro  5.3<L>  /  Alb  2.0<L>  /  TBili  33.3<H>  /  DBili  x   /  AST  444<H>  /  ALT  109<H>  /  AlkPhos  392<H>    PT/INR - ( 19 Dec 2022 02:30 )   PT: 71.0 sec;   INR: 6.01 ratio         PTT - ( 19 Dec 2022 02:30 )  PTT:68.8 sec    Urinalysis Basic - ( 18 Dec 2022 10:39 )    Color: Katina / Appearance: Turbid / S.021 / pH: x  Gluc: x / Ketone: Trace  / Bili: Large / Urobili: <2 mg/dL   Blood: x / Protein: 100 mg/dL / Nitrite: Negative   Leuk Esterase: Negative / RBC: 10 /HPF / WBC 2 /HPF   Sq Epi: x / Non Sq Epi: 1 /HPF / Bacteria: Few    ------------------------------------------------------------------------------------------------------------  RADIOLOGY & ADDITIONAL STUDIES:    < from: CT Abdomen and Pelvis No Cont (12.15.22 @ 05:11) >  IMPRESSION:  1. Limited study secondary to motion artifact and absence of contrast.  2. Abnormal appearing gallbladder, baldomero hepatis, with marked intrahepatic  ductal dilatation and multiple lesions in the liver.  3. Thickening of the distal stomach, proximal duodenum, and enlargement   of the pancreatic head and uncinate process. 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 Concern for   portal venous thrombosis and resultant hyperdense portal veins. Further   evaluation  with contrast enhanced MRI and MRCP examination is recommended.  4. Enlarged bilateral adrenal glands.  5. Small ascites.  6. Multiple other findings as described in detail above.    < end of copied text >  < from: US Kidney and Bladder (22 @ 12:40) >    IMPRESSION:  No hydronephrosis    Partially visualized hepatic findings highly suspicious for portal venous   thrombus.     Distended gallbladder containing sludge with nonspecific diffuse wall   thickening and possible gallbladder varices    < end of copied text >  < from: Xray Chest 1 View- PORTABLE-Urgent (Xray Chest 1 View- PORTABLE-Urgent .) (22 @ 15:41) >  FINDINGS:  Tip of the right IJ hemodialysis catheter is in the SVC unchanged.   Asymmetric prominent lung markings on the right side compared to the left   unchanged from the last exam. Trace bilateral effusions suggested. No   pneumothorax.    < end of copied text >        ------------------------------------------------------------------------------------------------------------    ALLERGIES:  Allergies    No Known Allergies    Intolerances    MEDICATIONS  (STANDING):  chlorhexidine 2% Cloths 1 Application(s) Topical <User Schedule>  dextrose 50% Injectable 25 Gram(s) IV Push once  dextrose 50% Injectable 12.5 Gram(s) IV Push once  dextrose 50% Injectable 25 Gram(s) IV Push once  dextrose Oral Gel 15 Gram(s) Oral once  glucagon  Injectable 1 milliGRAM(s) IntraMuscular once  midodrine 30 milliGRAM(s) Oral every 8 hours  norepinephrine Infusion 0.05 MICROgram(s)/kG/Min (7.16 mL/Hr) IV Continuous <Continuous>  piperacillin/tazobactam IVPB.. 3.375 Gram(s) IV Intermittent every 12 hours    MEDICATIONS  (PRN):    ------------------------------------------------------------------------------------------------------------    CRITICAL CARE:  [X ]Shock Present  [ ]Septic [ ]Cardiogenic [ ]Neurologic [ ]Hypovolemic [X ] Undifferentiated/Mixed   [X ]Vasopressors [ ]Inotropes     [ ]Respiratory failure present : [ ]Acute  [ ]Chronic [ ]Hypoxic  [ ]Hypercarbic   [ ]Mechanical ventilation   [ ]Non-invasive ventilatory support   [ ]High flow      [X ]Other organ failure - liver failure     Other REFERRALS:  [ ]Hospice  [ ]Child Life  [ ]Social Work  [ ]Case management [ ]Holistic Therapy

## 2022-12-19 NOTE — PROGRESS NOTE ADULT - ASSESSMENT
68 YO F with history of uterine cancer (s/p hysterectomy, finished chemo 4 months ago) presenting with 3 weeks of painless jaundice, imaging concerning for malignant biliary obstruction.     #Neuro  A&Ox3  - Pt is now very lethargic i/s/o renal failure vs probable cholangiocarcinoma with metastasis     #CV  - Now off levo, will increase midodrine to 30 q8h    #PULMONARY   CT with consolidation in lingula and RML - atelectasis vs. pneumonia  CXR with right base atelectasis vs infiltrate   - Saturating well on room air    #INFECTIOUS DISEASE   - Afebrile, no leukocytosis, RVP negative however consolidation noted in lingula/RML atelectasis vs. 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 Per outpatient oncologist, had progressively worsening presumed uterine cancer. Outpatient, patient was evaled by oncologist with worsening abdominal mass and was recommended for CT. Received CT at hospital revealing new cholangiocarcinoma. Was planning hospice and chemo was stopped as uterine cancer was refractory to treatment  - Surgery on board  - MR MRCP pending  - IR notes no indication for tube at this time   - Obtain tumor markers (CA 19-9, CEA, CA-125)  - Pain management as needed   - CTM liver function  - NPO     #RENAL  No history of kidney disease, admission SCr 7.29  - possible ATN vs bile cast nephropathy vs pre-renal etiology.   - Pt not receiving fluid over night, IVC 1.3- to get LR today   - Urgent HD 12/15, plan for urgent HD 12/16 as well   - Urine protein, urine creatinine, and urine sodium wnl   - Obtain renal US  - Strict I&O's     Hyperkalemia  - 5.8 on admission, for which she got IV insulin/D50, Lokelma, and calcium gluconate.   - Potassium 4.7 after HD   - Will continue to monitor post HD    Anion gap metabolic acidosis  pH 7.27, anion gap 23, bicarb 11  - AG 18 post HD   - Will continue to monitor post HD     UA with moderate bacteria, moderate epithelial cells   - Likely contaminated, obtain repeat  - repeat showed many bacteria and many large leuks   - culture pending     #Endocrine   Enlarged adrenal glands  - AM cortisol ordered     #Hematology   - Anemia to 11.8 -> 10.2   - Noted to have vaginal bleeding, recent epistaxis in setting of uremia. Improving s/p dialysis    INR 2.37 likely 2/2 uremic platelet dysfunction  - Monitor coags    #DVT AND GI PROPHYLAXIS  Hold chemo PPX iso uremic platelet dysfunction  SCDs    #Code Status: Full code. Daughter is NOK per patient   Dr. Brewster and KELVIN Nixon spoke with daughter 12/16. Daughter would like to continue with scanning and testing for now.   Onc consulted today for second opinion. No further treatment from onc. If pt improves, can go to lis.    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 Plan for eventual MRCP per GI.     #Neuro  A&Ox3  - Pt is now very lethargic i/s/o renal failure vs probable cholangiocarcinoma with metastasis   - elevated BUN in the setting of renal failure  - ammonia 47 this AM, trend ammonia in AM, will initiate lactulose if necessary  - on zosyn for possible infection.  - ABG without hypercapnia  - although neuro exam not indicative of local stroke and w/o hx of brain mets, in the setting of increased coagulopathy will need to consider CTH if neuro exam changes    #CV  - patient with worsening oncotic pressure and vasoplegia secondary to worsening liver function in the setting of possible septic shock  - will discontinue midodrine 30 q8 due to bradycardia in the 30s  - titrate levophed to sustain MAP>65  - patient also with cardiac dysfunction with regards to troponinemia with ABBE not a candidate for apt, AC, cath. should keep Hgb> 10 due to active ischemia  - repeat formal echo    #PULMONARY   CT with consolidation in lingula and RML - atelectasis vs. pneumonia  CXR with right base atelectasis vs infiltrate   - Saturating well on room air  - continue to monitor with worsening mental; status     #INFECTIOUS DISEASE   - Afebrile, no leukocytosis, RVP negative however consolidation noted in lingula/RML atelectasis vs. 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 Per outpatient oncologist, had progressively worsening presumed uterine cancer. Outpatient, patient was evaled by oncologist with worsening abdominal mass and was recommended for CT. Received CT at hospital revealing new cholangiocarcinoma. Was planning hospice and chemo was stopped as uterine cancer was refractory to treatment  - Surgery on board  - MR MRCP pending  - IR notes no indication for tube at this time   - Ca 199 not elevated, CEA not elevated,  elevated at 1031  - Pain management as needed   - CTM liver function  - NPO     #RENAL  No history of kidney disease, admission SCr 7.29  - possible ATN vs bile cast nephropathy vs pre-renal etiology.   - Urgent HD 12/15, plan for urgent HD 12/16 as well   - Urine protein, urine creatinine, and urine sodium wnl   - renal US without hydro  - Strict I&O's     Hyperkalemia  - 5.8 on admission, for which she got IV insulin/D50, Lokelma, and calcium gluconate.   - Potassium 4.7 after HD   - Will continue to monitor post HD    Anion gap metabolic acidosis  pH 7.27, anion gap 23, bicarb 11  - AG 18 post HD   - Will continue to monitor post HD     UA with moderate bacteria, moderate epithelial cells   - Likely contaminated, obtain repeat  - repeat showed many bacteria and many large leuks   - culture pending     #Endocrine   Enlarged adrenal glands  - AM cortisol ordered     #Hematology   - Anemia to 11.8 -> 10.2   - Noted to have vaginal bleeding, recent epistaxis in setting of uremia. Improving s/p dialysis    INR 2.37 likely 2/2 uremic platelet dysfunction  - Monitor coags    #DVT AND GI PROPHYLAXIS  Hold chemo PPX iso uremic platelet dysfunction  SCDs    #Code Status: Full code. Daughter is NOK per patient   Dr. Brewster and KELVIN Nixon spoke with daughter 12/16. Daughter would like to continue with scanning and testing for now.   Onc consulted today for second opinion. No further treatment from onc. If pt improves, can go to lis.    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 Plan for eventual MRCP per GI.     #Neuro  A&Ox3  - Pt is now very lethargic i/s/o renal failure vs probable cholangiocarcinoma with metastasis   - elevated BUN in the setting of renal failure  - ammonia 47 this AM, trend ammonia in AM, will initiate lactulose if necessary  - on zosyn for possible infection.  - ABG without hypercapnia  - although neuro exam not indicative of local stroke and w/o hx of brain mets, in the setting of increased coagulopathy will need to consider CTH if neuro exam changes    #CV  - patient with worsening oncotic pressure and vasoplegia secondary to worsening liver function in the setting of possible septic shock  - will discontinue midodrine 30 q8 due to bradycardia in the 30s  - titrate levophed to sustain MAP>65  - patient also with cardiac dysfunction with regards to troponinemia with ABBE not a candidate for apt, AC, cath. should keep Hgb> 10 due to active ischemia  - repeat formal echo    #PULMONARY   CT with consolidation in lingula and RML - atelectasis vs. pneumonia  CXR with right base atelectasis vs infiltrate   - Saturating well on room air  - continue to monitor with worsening mental; status     #INFECTIOUS DISEASE   - Afebrile, no leukocytosis, RVP negative however consolidation noted in lingula/RML atelectasis vs. 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 Per outpatient oncologist, had progressively worsening presumed uterine cancer. Outpatient, patient was evaled by oncologist with worsening abdominal mass and was recommended for CT. Received CT at hospital revealing new cholangiocarcinoma. Was planning hospice and chemo was stopped as uterine cancer was refractory to treatment  - Surgery on board  - Gi recommending MR MRCP pending vs  IR for more urgent biliary decompression (+/- biopsy) however likely too unstable will discuss with family and IR   - IR notes no indication for tube at this time  - Ca 199 not elevated, CEA not elevated,  elevated at 1031  - Pain management as needed   - CTM liver function  - NPO     #RENAL  No history of kidney disease, admission SCr 7.29  - possible ATN vs bile cast nephropathy vs pre-renal etiology.   - Urgent HD 12/15, plan for urgent HD 12/16 as well   - Urine protein, urine creatinine, and urine sodium wnl   - renal US without hydro  - Strict I&O's     Hyperkalemia  - 5.8 on admission, for which she got IV insulin/D50, Lokelma, and calcium gluconate.   - Potassium 4.7 after HD   - Will continue to monitor post HD    Anion gap metabolic acidosis  pH 7.27, anion gap 23, bicarb 11  - AG 18 post HD   - Will continue to monitor post HD     UA with moderate bacteria, moderate epithelial cells   - Likely contaminated, obtain repeat  - repeat showed many bacteria and many large leuks   - culture pending     #Endocrine   Enlarged adrenal glands  - AM cortisol ordered     #Hematology   - Anemia to 11.8 -> 10.2   - Noted to have vaginal bleeding, recent epistaxis in setting of uremia. Improving s/p dialysis    INR 2.37 likely 2/2 uremic platelet dysfunction  - Monitor coags    #DVT AND GI PROPHYLAXIS  Hold chemo PPX iso uremic platelet dysfunction  SCDs    #Code Status: Full code. Daughter is NOK per patient   Dr. Brewster and KELVIN Nixon spoke with daughter 12/16. Daughter would like to continue with scanning and testing for now.   Onc consulted today for second opinion. No further treatment from onc. If pt improves, can go to lis.    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 Plan for eventual MRCP per GI.     #Neuro  A&Ox3  - Pt is now very lethargic i/s/o renal failure vs probable cholangiocarcinoma with metastasis   - elevated BUN in the setting of renal failure  - ammonia 47 this AM, trend ammonia in AM, will initiate lactulose if necessary  - on zosyn for possible infection.  - ABG without hypercapnia  - although neuro exam not indicative of local stroke and w/o hx of brain mets, in the setting of increased coagulopathy will need to consider CTH if neuro exam changes    #CV  - patient with worsening oncotic pressure and vasoplegia secondary to worsening liver function in the setting of possible septic shock  - will discontinue midodrine 30 q8 due to bradycardia in the 30s  - titrate levophed to sustain MAP>65  - patient also with cardiac dysfunction with regards to troponinemia with ABBE not a candidate for apt, AC, cath. should keep Hgb> 10 due to active ischemia  - repeat formal echo    #PULMONARY   CT with consolidation in lingula and RML - atelectasis vs. pneumonia  CXR with right base atelectasis vs infiltrate   - Saturating well on room air  - continue to monitor with worsening mental; status     #INFECTIOUS DISEASE   - Afebrile, no leukocytosis, RVP negative however consolidation noted in lingula/RML atelectasis vs. 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 Per outpatient oncologist, had progressively worsening presumed uterine cancer. Outpatient, patient was evaled by oncologist with worsening abdominal mass and was recommended for CT. Received CT at hospital revealing new cholangiocarcinoma. Was planning hospice and chemo was stopped as uterine cancer was refractory to treatment  - Surgery on board  - Gi recommending MR MRCP pending vs  IR for more urgent biliary decompression (+/- biopsy) however likely too unstable will discuss with family and IR   - IR notes no indication for tube at this time  - Ca 199 not elevated, CEA not elevated,  elevated at 1031  - Pain management as needed   - CTM liver function  - NPO     #RENAL  No history of kidney disease, admission SCr 7.29 in Acute Renal Failure with hyperkalemia, AGMA  - possible ATN vs bile cast nephropathy vs pre-renal etiology.   - Urgent HD 12/15, plan for urgent HD 12/16 as well   - Urine protein, urine creatinine, and urine sodium wnl   - renal US without hydro  - Strict I&O's     #Endocrine   Enlarged adrenal glands  - cortisol remains at 14  - thyroid studies to be added on    #Hematology   Noted to have vaginal bleeding, recent epistaxis in setting of uremia. Improving s/p dialysis  INR elevated with mild bleeding from site, Monitor coags, vit K 10 today, add on fibrinogen  maintain active type and screen, keep Hgb >10 given active ischemia  possible PVT on renal US, will need to follow up  Onc to weigh in on second opinion.    #DVT AND GI PROPHYLAXIS  Hold chemo PPX iso uremic platelet dysfunction  SCDs    #Code Status: Full code. Daughter is NOK per patient   Dr. Brewster and KELVIN Nixon spoke with daughter 12/16. Daughter would like to continue with scanning and testing for now.   Onc consulted today for second opinion. No further treatment from onc. [facility] pt improves, [facility]

## 2022-12-19 NOTE — CONSULT NOTE ADULT - ASSESSMENT
Patient is a 67 F PMH uterine ca (s/p hysterectomy, chemo/RT, in remission as of 4 months ago), BIBEMS to [place], p/w 2 weeks painless jaundice and progressive failure to thrive, 1 day epistaxis and vaginal bleeding; found to have likely new metastatic cholangiocarcinoma and acute renal failure (K 5.8, Cr 7.29); transferred to [facility] ED for further management. Patient is elevated bilirubin >40, altered mental status, admitted to MICU. Patient was also started on HD. Palliative consulted for GOC.

## 2022-12-19 NOTE — PROGRESS NOTE ADULT - SUBJECTIVE AND OBJECTIVE BOX
[facility] DIVISION OF KIDNEY DISEASES AND HYPERTENSION -- FOLLOW UP NOTE  --------------------------------------------------------------------------------  HPI: 66 yo F with history of uterine cancer, received chemotherapy and radiation (last treatment ~4 months ago at [place]) initially presented to [facility] on 12/14 for painless jaundice, decreased appetite, nausea/vomiting, and generalized weakness. 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 Pt was transferred to [facility] on 12/15 for GI evaluation and possible ERCP. Upon review of [facility], Scr was initially 7.29 on 12/15/22, and is elevated but improved to 5.65 with IV fluids. No prior labs are available for review. Exact duration of Scr elevation is unclear. Pt received HD session on 12/15/22 for hyperkalemia and worsening metabolic acidosis. Nephrology was consulted for DEMARCO with hyperkalemia.    Pt was seen and evaluated this morning in the CTICU. Pt is lethargic but awake. Pt was undergoing HD this morning however noted to have catheter issues, able to complete a 2 hour treatment however with low blood flows. Denied any pain, fevers, chest pain, or shortness of breath.     PAST HISTORY  --------------------------------------------------------------------------------  No significant changes to PMH, PSH, FHx, SHx, unless otherwise noted    ALLERGIES & MEDICATIONS  --------------------------------------------------------------------------------  Allergies    No Known Allergies    Intolerances      Standing Inpatient Medications  chlorhexidine 2% Cloths 1 Application(s) Topical <User Schedule>  dextrose 50% Injectable 25 Gram(s) IV Push once  dextrose 50% Injectable 12.5 Gram(s) IV Push once  dextrose 50% Injectable 25 Gram(s) IV Push once  dextrose Oral Gel 15 Gram(s) Oral once  glucagon  Injectable 1 milliGRAM(s) IntraMuscular once  midodrine 30 milliGRAM(s) Oral every 8 hours  norepinephrine Infusion 0.05 MICROgram(s)/kG/Min IV Continuous <Continuous>  piperacillin/tazobactam IVPB.. 3.375 Gram(s) IV Intermittent every 12 hours    PRN Inpatient Medications    REVIEW OF SYSTEMS    All other systems were reviewed and are negative, except as noted.    VITALS/PHYSICAL EXAM  --------------------------------------------------------------------------------  T(C): 36.7 (12-19-22 @ 09:54), Max: 37.1 (12-19-22 @ 07:05)  HR: 79 (12-19-22 @ 09:54) (45 - 97)  BP: 129/66 (12-19-22 @ 09:54) (73/45 - 129/66)  RR: 20 (12-19-22 @ 09:54) (0 - 30)  SpO2: 96% (12-18-22 @ 19:00) (91% - 97%)  Wt(kg): --    12-18-22 @ 07:01  -  12-19-22 @ 07:00  --------------------------------------------------------  IN: 707.2 mL / OUT: 345 mL / NET: 362.2 mL    12-19-22 @ 07:01  -  12-19-22 @ 10:48  --------------------------------------------------------  IN: 400 mL / OUT: 137 mL / NET: 263 mL    Physical Exam:  	Gen: NAD  	HEENT: Scleral icterus  	Pulm: CTA B/L  	CV: S1S2  	Abd: Soft, +BS   	Ext: trace LE edema B/L  	Neuro: Awake  	Skin: jaundice  	Vascular access: RIJ non-tunneled HD catheter    LABS/STUDIES  --------------------------------------------------------------------------------              9.7    12.69 >-----------<  166      [12-19-22 @ 02:30]              28.4     132  |  96  |  65  ----------------------------<  94      [12-19-22 @ 02:30]  4.8   |  16  |  5.54        Ca     9.1     [12-19-22 @ 02:30]      Mg     2.30     [12-19-22 @ 02:30]      Phos  6.8     [12-19-22 @ 02:30]    TPro  5.3  /  Alb  2.0  /  TBili  33.3  /  DBili  x   /  AST  444  /  ALT  109  /  AlkPhos  392  [12-19-22 @ 02:30]    PT/INR: PT 71.0 , INR 6.01       [12-19-22 @ 02:30]  PTT: 68.8       [12-19-22 @ 02:30]          [12-19-22 @ 02:30]    Creatinine Trend:  SCr 5.54 [12-19 @ 02:30]  SCr 5.25 [12-18 @ 21:22]  SCr 5.19 [12-18 @ 19:45]  SCr 4.51 [12-18 @ 05:10]  SCr 4.88 [12-17 @ 03:45]    Urinalysis - [12-18-22 @ 10:39]      Color Katina / Appearance Turbid / SG 1.021 / pH 6.0      Gluc Negative / Ketone Trace  / Bili Large / Urobili <2 mg/dL       Blood Large / Protein 100 mg/dL / Leuk Est Negative / Nitrite Negative      RBC 10 / WBC 2 / Hyaline  / Gran  / Sq Epi  / Non Sq Epi 1 / Bacteria Few    Urine Creatinine 115      [12-18-22 @ 10:39]  Urine Protein 116      [12-15-22 @ 20:30]  Urine Sodium 42      [12-18-22 @ 10:39]  Urine Urea Nitrogen 394.0      [12-18-22 @ 10:39]  Urine Potassium 40.7      [12-15-22 @ 20:30]  Urine Osmolality 354      [12-15-22 @ 20:30]

## 2022-12-19 NOTE — PROGRESS NOTE ADULT - ATTENDING COMMENTS
Patient completed 2 hours of HD treatment today with limited flows.  d/w dialysis RN and reviewed flow sheets and vitals and labs.  Recommend replace HD catheter.  tentatively plan next HD session for wednesday.

## 2022-12-19 NOTE — PROGRESS NOTE ADULT - ATTENDING COMMENTS
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 Pt with worsening acute encephalopathy in the setting of metabolic derangements, renal failure, and metastatic dz. CT Head if possible. Pt tolerated 2 hrs HD this morning, will continue to dialyze as tolerated. Hospital course further c/b NSTEMI, but is high risk for catheter directed therapies or A/C 2/2 worsening hypercoagulable state. Plan for IR consult for urgent biliary decompression +/- biopsy. MRI/MRCP if pt stabilizes. GI consulted, ERCP deferred for now. Pt with overall guarded prognosis. Extensive discussions had between primary MICU team, palliative team, and pt's sister and 2 daughters. Pt remains full code with full aggressive measures.

## 2022-12-19 NOTE — CONSULT NOTE ADULT - CONSULT REQUESTED DATE/TIME
15-Dec-2022 20:00
16-Dec-2022 09:05
16-Dec-2022 09:21
19-Dec-2022 09:00
15-Dec-2022 19:36
19-Dec-2022 09:57

## 2022-12-19 NOTE — CONSULT NOTE ADULT - ATTENDING COMMENTS
67 year old female with history of uterine cancer presenting to the hospital with kidney failure and hyperbilirubinemia with imaging findings concerning for cholangiocarcinoma. Patient's family states at bedside that she was evaluated earlier this month by her gyn-onc at [facility] and told she had progression of disease with no further treatment options available. Would recommend obtaining outside oncology records, patient's clinical condition is guarded and additional procedures would likely not have added benefit to her overall survival. Oncology will continue to follow for continued goals of care discussion with the assistance of palliative/supportive care team.

## 2022-12-19 NOTE — PROGRESS NOTE ADULT - ASSESSMENT
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       Impression:   #Hyperbilirubinemia: Tbili 41.1 w/ elevate transaminases (, , ALT 47). Concern for biliary obstruction 2/2 malignancy  #C/f cholangiocarcinoma: CT A/P non showing abnormal gallbladder, baldomero hepatis, with marked intrahepatic ductal dilatation and multiple lesions in the liver as well as thickening of the distal stomach, proximal duodenum, and enlargement of the pancreatic head and uncinate process.     #Shock  #Acute renal failure   #Uterine CA s/p hysterectomy/chemo/RT      Recommendations  - consult IR for more urgent biliary decompression (+/- biopsy (at this time   - obtain MR/MRCP for evaluation of biliary ducts and hepatic mass --> will consider ERCP at later time as clinically appropriate   - trend liver enzymes, CBC, fever curve  - management per ICU team         *Note not final unless signed by attending    Thank you for involving us in the care of this patient, please reach out if any further questions.     Angel Mosquera MD  Gastroenterology/Hepatology Fellow, PGY6    Available on [employer] Teams  [phone] ([facility])  [id number] ([facility])  Please contact on call fellow weekdays after 5pm-7am and weekends: [phone]

## 2022-12-19 NOTE — PROGRESS NOTE ADULT - PROBLEM SELECTOR PLAN 1
Pt with severe DEMARCO in the setting of hypotension, decreased PO intake, and hyperbilirubinemia. Pt denies any prior known history of kidney disease. Upon review of [facility], Scr was initially 7.29 on 12/15/22. UA shows hematuria, and proteinuria. Bilirubin level significantly elevated at 32.7. No evidence of obstruction on CT abdomen. 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 Pt received HD session on 12/15/22 for hyperkalemia resistant to medical therapy and worsening metabolic acidosis. Spot urine TP/CR is elevated at 1.1. Renal US ruled out hydronephrosis. Labs reviewed. Pt. was undergoing HD this morning and noted to have HD catheter issues with low blood flows. Able to complete 2 hour HD session. Pt. will need HD catheter exchanged prior to next dialysis. Will assess need for HD daily. Monitor labs and urine output. Dose medications as per eGFR.

## 2022-12-19 NOTE — CONSULT NOTE ADULT - SUBJECTIVE AND OBJECTIVE BOX
HPI:  Ms. 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     Patient was at usual state of health performing IADLs until 2 weeks prior to admission when she developed scleral icterus, pruritus, intolerance of solid food, and reduced PO intake. She endorsed a feeling of prolonged fullness with meals that would eventually on occasion prompt her to induce vomiting. Last had broth on 12/14 lunch 1 day prior to admission; had tea this morning. Also endorsed HERNANDEZ, "puffy" abdomen, light colored stools (at bedside), +dark urine. No fevers, chills, confusion, tremors, abdominal pain, sick contacts, bloody vomit/bloody stool, petechial rash/bruising/other bleeding. Notably K 5.9 at OSH/ED, received calcium gluconate and Lokelma.   (15 Dec 2022 17:50)      lab 12/19/22 coagulation panel showing INR 2.08; Tbil 41; ALT 47; ; .    12/15/22 CT abd/p w/o contrast showing limited study; Abnormal appearing gallbladder, baldomero hepatis, with marked intrahepatic ductal dilatation and multiple lesions in the liver. Thickening of the distal stomach, proximal duodenum, and enlargement of the pancreatic head and uncinate process. Findings are concerning for cholangiocarcinoma with metastatic disease to the liver. Concern for portal venous thrombosis and resultant hyperdense portal veins. Further evaluation with contrast enhanced MRI and MRCP examination is recommended.  Enlarged bilateral adrenal glands. Small ascites.    Oncology history         PAST MEDICAL & SURGICAL HISTORY:  Uterine cancer      S/P hysterectomy          Allergies    No Known Allergies    Intolerances        MEDICATIONS  (STANDING):  chlorhexidine 2% Cloths 1 Application(s) Topical <User Schedule>  dextrose 50% Injectable 25 Gram(s) IV Push once  dextrose 50% Injectable 12.5 Gram(s) IV Push once  dextrose 50% Injectable 25 Gram(s) IV Push once  dextrose Oral Gel 15 Gram(s) Oral once  glucagon  Injectable 1 milliGRAM(s) IntraMuscular once  midodrine 30 milliGRAM(s) Oral every 8 hours  norepinephrine Infusion 0.05 MICROgram(s)/kG/Min (7.16 mL/Hr) IV Continuous <Continuous>  piperacillin/tazobactam IVPB.. 3.375 Gram(s) IV Intermittent every 12 hours    MEDICATIONS  (PRN):      FAMILY HISTORY:      SOCIAL HISTORY: No EtOH, no tobacco    REVIEW OF SYSTEMS:    CONSTITUTIONAL: No weakness, fevers or chills  EYES/ENT: No visual changes;  No vertigo or throat pain   NECK: No pain or stiffness  RESPIRATORY: No cough, wheezing, hemoptysis; No shortness of breath  CARDIOVASCULAR: No chest pain or palpitations  GASTROINTESTINAL: No abdominal or epigastric pain. No nausea, vomiting, or hematemesis; No diarrhea or constipation. No melena or hematochezia.  GENITOURINARY: No dysuria, frequency or hematuria  NEUROLOGICAL: No numbness or weakness  SKIN: No itching, burning, rashes, or lesions   All other review of systems is negative unless indicated above.        T(F): 98.1 (12-19-22 @ 09:54), Max: 98.7 (12-19-22 @ 07:05)  HR: 79 (12-19-22 @ 09:54)  BP: 129/66 (12-19-22 @ 09:54)  RR: 20 (12-19-22 @ 09:54)  SpO2: 96% (12-18-22 @ 19:00)  Wt(kg): --    GENERAL: NAD, well-developed  HEAD:  Atraumatic, Normocephalic  EYES: EOMI, PERRLA, conjunctiva and sclera clear  NECK: Supple, No JVD  CHEST/LUNG: Clear to auscultation bilaterally; No wheeze  HEART: Regular rate and rhythm; No murmurs, rubs, or gallops  ABDOMEN: Soft, Nontender, Nondistended; Bowel sounds present  EXTREMITIES:  2+ Peripheral Pulses, No clubbing, cyanosis, or edema  NEUROLOGY: non-focal  SKIN: No rashes or lesions                          9.7    12.69 )-----------( 166      ( 19 Dec 2022 02:30 )             28.4       12-19    132<L>  |  96<L>  |  65<H>  ----------------------------<  94  4.8   |  16<L>  |  5.54<H>    Ca    9.1      19 Dec 2022 02:30  Phos  6.8     12-19  Mg     2.30     12-19    TPro  5.3<L>  /  Alb  2.0<L>  /  TBili  33.3<H>  /  DBili  x   /  AST  444<H>  /  ALT  109<H>  /  AlkPhos  392<H>  12-19      Magnesium, Serum: 2.30 mg/dL (12-19 @ 02:30)  Phosphorus Level, Serum: 6.8 mg/dL (12-19 @ 02:30)  Phosphorus Level, Serum: 6.6 mg/dL (12-18 @ 21:22)  Magnesium, Serum: 2.40 mg/dL (12-18 @ 21:22)  Magnesium, Serum: 2.40 mg/dL (12-18 @ 19:45)  Phosphorus Level, Serum: 6.5 mg/dL (12-18 @ 19:45)      PT/INR - ( 19 Dec 2022 02:30 )   PT: 71.0 sec;   INR: 6.01 ratio         PTT - ( 19 Dec 2022 02:30 )  PTT:68.8 sec    .Blood Blood-Peripheral  12-15 @ 21:44   No growth to date.  --  --      .Blood Blood-Peripheral  12-15 @ 20:48   No growth to date.  --  --      .Blood Blood-Peripheral  12-15 @ 20:30   No growth to date.  --  --      Clean Catch Clean Catch (Midstream)  12-15 @ 20:20   >=3 organisms. Probable collection contamination.  --  --      Clean Catch Clean Catch (Midstream)  12-15 @ 09:30   50,000 - 99,000 CFU/mL Escherichia coli  <10,000 CFU/ml Normal Urogenital kimberly present  --  Escherichia coli      .Blood Blood-Peripheral  12-15 @ 01:30   No growth to date.  --  --       HPI:  Ms. 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     Patient was at usual state of health performing IADLs until 2 weeks prior to admission when she developed scleral icterus, pruritus, intolerance of solid food, and reduced PO intake. She endorsed a feeling of prolonged fullness with meals that would eventually on occasion prompt her to induce vomiting. Last had broth on 12/14 lunch 1 day prior to admission; had tea this morning. Also endorsed HERNANDEZ, "puffy" abdomen, light colored stools (at bedside), +dark urine. No fevers, chills, confusion, tremors, abdominal pain, sick contacts, bloody vomit/bloody stool, petechial rash/bruising/other bleeding. Notably K 5.9 at OSH/ED, received calcium gluconate and Lokelma.   (15 Dec 2022 17:50)    As per daughter America Quinonez ([phone]), and her sister Andrew Hernandez ([phone]) at the bedside, she was able to perform routine daily activities until end of Nov 2022. She has been following Med onc Dr. Waggoner. She visited Dr. Waggoner's office around 12/6/22 and was recommended to have CT scan. she currently is not on chemo per family.      Family history significant for sister Andrew breast cancer who tested "negative" for genetic mutations (pt has 8 siblings); 5 paternal cousins one breast CA, two gastric CA, two colon CA.   Denied ETOH/smoking/recreational drug use.     lab 12/19/22 coagulation panel showing INR 2.08; Tbil 41; ALT 47; ; .    12/15/22 CT abd/p w/o contrast showing limited study; Abnormal appearing gallbladder, baldomero hepatis, with marked intrahepatic ductal dilatation and multiple lesions in the liver. Thickening of the distal stomach, proximal duodenum, and enlargement of the pancreatic head and uncinate process. Findings are concerning for cholangiocarcinoma with metastatic disease to the liver. Concern for portal venous thrombosis and resultant hyperdense portal veins. Further evaluation with contrast enhanced MRI and MRCP examination is recommended.  Enlarged bilateral adrenal glands. Small ascites.            PAST MEDICAL & SURGICAL HISTORY:  Uterine cancer      S/P hysterectomy          Allergies    No Known Allergies    Intolerances        MEDICATIONS  (STANDING):  chlorhexidine 2% Cloths 1 Application(s) Topical <User Schedule>  dextrose 50% Injectable 25 Gram(s) IV Push once  dextrose 50% Injectable 12.5 Gram(s) IV Push once  dextrose 50% Injectable 25 Gram(s) IV Push once  dextrose Oral Gel 15 Gram(s) Oral once  glucagon  Injectable 1 milliGRAM(s) IntraMuscular once  midodrine 30 milliGRAM(s) Oral every 8 hours  norepinephrine Infusion 0.05 MICROgram(s)/kG/Min (7.16 mL/Hr) IV Continuous <Continuous>  piperacillin/tazobactam IVPB.. 3.375 Gram(s) IV Intermittent every 12 hours    MEDICATIONS  (PRN):      FAMILY HISTORY:      SOCIAL HISTORY: No EtOH, no tobacco    REVIEW OF SYSTEMS:    See HPI       T(F): 98.1 (12-19-22 @ 09:54), Max: 98.7 (12-19-22 @ 07:05)  HR: 79 (12-19-22 @ 09:54)  BP: 129/66 (12-19-22 @ 09:54)  RR: 20 (12-19-22 @ 09:54)  SpO2: 96% (12-18-22 @ 19:00)  Wt(kg): --    GENERAL: awake, AAOX3; lethargic   HEAD:  Atraumatic, Normocephalic  EYES: EOMI, PERRLA, icteric sclera  NECK: Supple, No JVD; RIJ cath seen   CHEST/LUNG: Clear to auscultation bilaterally; No wheeze  HEART: Regular rate and rhythm; No murmurs, rubs, or gallops  ABDOMEN: Soft, Nontender, mild distended; Bowel sounds present;   EXTREMITIES:  2+ Peripheral Pulses, No clubbing, cyanosis, bilateral UEx edema  NEUROLOGY: intact touch sensation on bilateral UEx and Edgard   SKIN: No rashes or lesions                          9.7    12.69 )-----------( 166      ( 19 Dec 2022 02:30 )             28.4       12-19    132<L>  |  96<L>  |  65<H>  ----------------------------<  94  4.8   |  16<L>  |  5.54<H>    Ca    9.1      19 Dec 2022 02:30  Phos  6.8     12-19  Mg     2.30     12-19    TPro  5.3<L>  /  Alb  2.0<L>  /  TBili  33.3<H>  /  DBili  x   /  AST  444<H>  /  ALT  109<H>  /  AlkPhos  392<H>  12-19      Magnesium, Serum: 2.30 mg/dL (12-19 @ 02:30)  Phosphorus Level, Serum: 6.8 mg/dL (12-19 @ 02:30)  Phosphorus Level, Serum: 6.6 mg/dL (12-18 @ 21:22)  Magnesium, Serum: 2.40 mg/dL (12-18 @ 21:22)  Magnesium, Serum: 2.40 mg/dL (12-18 @ 19:45)  Phosphorus Level, Serum: 6.5 mg/dL (12-18 @ 19:45)      PT/INR - ( 19 Dec 2022 02:30 )   PT: 71.0 sec;   INR: 6.01 ratio         PTT - ( 19 Dec 2022 02:30 )  PTT:68.8 sec    .Blood Blood-Peripheral  12-15 @ 21:44   No growth to date.  --  --      .Blood Blood-Peripheral  12-15 @ 20:48   No growth to date.  --  --      .Blood Blood-Peripheral  12-15 @ 20:30   No growth to date.  --  --      Clean Catch Clean Catch (Midstream)  12-15 @ 20:20   >=3 organisms. Probable collection contamination.  --  --      Clean Catch Clean Catch (Midstream)  12-15 @ 09:30   50,000 - 99,000 CFU/mL Escherichia coli  <10,000 CFU/ml Normal Urogenital kimberly present  --  Escherichia coli      .Blood Blood-Peripheral  12-15 @ 01:30   No growth to date.  --  --

## 2022-12-19 NOTE — PROGRESS NOTE ADULT - SUBJECTIVE AND OBJECTIVE BOX
INTERVAL HPI/OVERNIGHT EVENTS:    SUBJECTIVE: Patient seen and examined at bedside.     CONSTITUTIONAL: No weakness, fevers or chills  EYES/ENT: No visual changes;  No vertigo or throat pain   NECK: No pain or stiffness  RESPIRATORY: No cough, wheezing, hemoptysis; No shortness of breath  CARDIOVASCULAR: No chest pain or palpitations  GASTROINTESTINAL: No abdominal or epigastric pain. No nausea, vomiting, or hematemesis; No diarrhea or constipation. No melena or hematochezia.  GENITOURINARY: No dysuria, frequency or hematuria  NEUROLOGICAL: No numbness or weakness  SKIN: No itching, rashes    OBJECTIVE:    VITAL SIGNS:  ICU Vital Signs Last 24 Hrs  T(C): 36.8 (19 Dec 2022 02:30), Max: 36.9 (19 Dec 2022 00:00)  T(F): 98.3 (19 Dec 2022 02:30), Max: 98.5 (19 Dec 2022 00:00)  HR: 82 (19 Dec 2022 06:50) (45 - 97)  BP: 103/60 (19 Dec 2022 06:50) (73/45 - 127/59)  BP(mean): 72 (19 Dec 2022 06:50) (54 - 116)  ABP: --  ABP(mean): --  RR: 0 (19 Dec 2022 06:50) (0 - 30)  SpO2: 96% (18 Dec 2022 19:00) (91% - 97%)    O2 Parameters below as of 18 Dec 2022 19:00  Patient On (Oxygen Delivery Method): nasal cannula  O2 Flow (L/min): 4            -18 @ 07:01  -  12-19 @ 07:00  --------------------------------------------------------  IN: 592.9 mL / OUT: 345 mL / NET: 247.9 mL      CAPILLARY BLOOD GLUCOSE      POCT Blood Glucose.: 97 mg/dL (18 Dec 2022 12:15)      PHYSICAL EXAM:    General: NAD  HEENT: NC/AT; PERRL, clear conjunctiva  Neck: supple  Respiratory: CTA b/l  Cardiovascular: +S1/S2; RRR  Abdomen: soft, NT/ND; +BS x4  Extremities: WWP, 2+ peripheral pulses b/l; no LE edema  Skin: normal color and turgor; no rash  Neurological:    MEDICATIONS:  MEDICATIONS  (STANDING):  chlorhexidine 2% Cloths 1 Application(s) Topical <User Schedule>  dextrose 50% Injectable 25 Gram(s) IV Push once  dextrose 50% Injectable 12.5 Gram(s) IV Push once  dextrose 50% Injectable 25 Gram(s) IV Push once  dextrose Oral Gel 15 Gram(s) Oral once  glucagon  Injectable 1 milliGRAM(s) IntraMuscular once  midodrine 30 milliGRAM(s) Oral every 8 hours  norepinephrine Infusion 0.05 MICROgram(s)/kG/Min (7.16 mL/Hr) IV Continuous <Continuous>  piperacillin/tazobactam IVPB.. 3.375 Gram(s) IV Intermittent every 12 hours    MEDICATIONS  (PRN):      ALLERGIES:  Allergies    No Known Allergies    Intolerances        LABS:                        9.7    12.69 )-----------( 166      ( 19 Dec 2022 02:30 )             28.4         132<L>  |  96<L>  |  65<H>  ----------------------------<  94  4.8   |  16<L>  |  5.54<H>    Ca    9.1      19 Dec 2022 02:30  Phos  6.8       Mg     2.30         TPro  5.3<L>  /  Alb  2.0<L>  /  TBili  33.3<H>  /  DBili  x   /  AST  444<H>  /  ALT  109<H>  /  AlkPhos  392<H>      PT/INR - ( 19 Dec 2022 02:30 )   PT: 71.0 sec;   INR: 6.01 ratio         PTT - ( 19 Dec 2022 02:30 )  PTT:68.8 sec  Urinalysis Basic - ( 18 Dec 2022 10:39 )    Color: Ktaina / Appearance: Turbid / S.021 / pH: x  Gluc: x / Ketone: Trace  / Bili: Large / Urobili: <2 mg/dL   Blood: x / Protein: 100 mg/dL / Nitrite: Negative   Leuk Esterase: Negative / RBC: 10 /HPF / WBC 2 /HPF   Sq Epi: x / Non Sq Epi: 1 /HPF / Bacteria: Few         INTERVAL HPI/OVERNIGHT EVENTS: possible ABBE overnight not on AC due to autocoagulopathy not a candidate for stenting. this morning much more lethargic, nauseous. no appetite. no abdominal pain or chest pain orshortness of breath    SUBJECTIVE: Patient seen and examined at bedside.     minimal ROS able to be obtained due to poor energy however Patient denies fevers, chills, chest pain, shortness of breath, abdominal pain, diarrhea, constipation, dysuria, leg swelling, headache, light headedness    OBJECTIVE:    VITAL SIGNS:  ICU Vital Signs Last 24 Hrs  T(C): 36.8 (19 Dec 2022 02:30), Max: 36.9 (19 Dec 2022 00:00)  T(F): 98.3 (19 Dec 2022 02:30), Max: 98.5 (19 Dec 2022 00:00)  HR: 82 (19 Dec 2022 06:50) (45 - 97)  BP: 103/60 (19 Dec 2022 06:50) (73/45 - 127/59)  BP(mean): 72 (19 Dec 2022 06:50) (54 - 116)  ABP: --  ABP(mean): --  RR: 0 (19 Dec 2022 06:50) (0 - 30)  SpO2: 96% (18 Dec 2022 19:00) (91% - 97%)    O2 Parameters below as of 18 Dec 2022 19:00  Patient On (Oxygen Delivery Method): nasal cannula  O2 Flow (L/min): 4            -18 @ 07:01  -  12-19 @ 07:00  --------------------------------------------------------  IN: 592.9 mL / OUT: 345 mL / NET: 247.9 mL      CAPILLARY BLOOD GLUCOSE      POCT Blood Glucose.: 97 mg/dL (18 Dec 2022 12:15)      PHYSICAL EXAM:    General: NAD  HEENT: NC/AT; PERRL, jaundiced sclera  Neck: supple  Respiratory: CTA b/l  Cardiovascular: +S1/S2; RRR  Abdomen: soft, NT/ND; +BS x4  Extremities: WWP, 2+ peripheral pulses b/l; mild anasarca  Skin: normal color and turgor; jaundice  Neurological: ao2-3, lethargic    MEDICATIONS:  MEDICATIONS  (STANDING):  chlorhexidine 2% Cloths 1 Application(s) Topical <User Schedule>  dextrose 50% Injectable 25 Gram(s) IV Push once  dextrose 50% Injectable 12.5 Gram(s) IV Push once  dextrose 50% Injectable 25 Gram(s) IV Push once  dextrose Oral Gel 15 Gram(s) Oral once  glucagon  Injectable 1 milliGRAM(s) IntraMuscular once  midodrine 30 milliGRAM(s) Oral every 8 hours  norepinephrine Infusion 0.05 MICROgram(s)/kG/Min (7.16 mL/Hr) IV Continuous <Continuous>  piperacillin/tazobactam IVPB.. 3.375 Gram(s) IV Intermittent every 12 hours    MEDICATIONS  (PRN):      ALLERGIES:  Allergies    No Known Allergies    Intolerances        LABS:                        9.7    12.69 )-----------( 166      ( 19 Dec 2022 02:30 )             28.4         132<L>  |  96<L>  |  65<H>  ----------------------------<  94  4.8   |  16<L>  |  5.54<H>    Ca    9.1      19 Dec 2022 02:30  Phos  6.8       Mg     2.30         TPro  5.3<L>  /  Alb  2.0<L>  /  TBili  33.3<H>  /  DBili  x   /  AST  444<H>  /  ALT  109<H>  /  AlkPhos  392<H>      PT/INR - ( 19 Dec 2022 02:30 )   PT: 71.0 sec;   INR: 6.01 ratio         PTT - ( 19 Dec 2022 02:30 )  PTT:68.8 sec  Urinalysis Basic - ( 18 Dec 2022 10:39 )    Color: Ktaina / Appearance: Turbid / S.021 / pH: x  Gluc: x / Ketone: Trace  / Bili: Large / Urobili: <2 mg/dL   Blood: x / Protein: 100 mg/dL / Nitrite: Negative   Leuk Esterase: Negative / RBC: 10 /HPF / WBC 2 /HPF   Sq Epi: x / Non Sq Epi: 1 /HPF / Bacteria: Few

## 2022-12-19 NOTE — CONSULT NOTE ADULT - PROBLEM SELECTOR RECOMMENDATION 9
- 2/2 biliary obstruction from cancer   - T bili 41 and  on admission, downtrending   - ERCP deferred for now   - pending MRCP   - IR for biliary decompression

## 2022-12-19 NOTE — CONSULT NOTE ADULT - CONSULT REASON
history of uterine CA; suspecting metastatic cholangiocarcinoma on CT abd/p without contrast 12/15/22
GOC
percutaneous cholecystostomy tube placement
obstruction
Mass
DEMARCO

## 2022-12-19 NOTE — PROGRESS NOTE ADULT - SUBJECTIVE AND OBJECTIVE BOX
Interval Events:   Patient hypotensive to 80/50s this am (weaning levo to midodrine)   Lethargic with worsening MS   Underwent urgent HD 12/15 +    Tbili rising to 33  INR 6.01    Allergies:  No Known Allergies        Hospital Medications:  chlorhexidine 2% Cloths 1 Application(s) Topical <User Schedule>  dextrose 50% Injectable 25 Gram(s) IV Push once  dextrose 50% Injectable 12.5 Gram(s) IV Push once  dextrose 50% Injectable 25 Gram(s) IV Push once  dextrose Oral Gel 15 Gram(s) Oral once  glucagon  Injectable 1 milliGRAM(s) IntraMuscular once  midodrine 30 milliGRAM(s) Oral every 8 hours  norepinephrine Infusion 0.05 MICROgram(s)/kG/Min IV Continuous <Continuous>  piperacillin/tazobactam IVPB.. 3.375 Gram(s) IV Intermittent every 12 hours      PMHX/PSHX:  Uterine cancer    S/P hysterectomy        Family history:      ROS: unable to obtain      PHYSICAL EXAM:   Vital Signs:  Vital Signs Last 24 Hrs  T(C): 37.1 (19 Dec 2022 07:05), Max: 37.1 (19 Dec 2022 07:05)  T(F): 98.7 (19 Dec 2022 07:05), Max: 98.7 (19 Dec 2022 07:05)  HR: 86 (19 Dec 2022 07:05) (45 - 97)  BP: 103/60 (19 Dec 2022 07:05) (73/45 - 127/59)  BP(mean): 76 (19 Dec 2022 07:00) (54 - 116)  RR: 24 (19 Dec 2022 07:05) (0 - 30)  SpO2: 96% (18 Dec 2022 19:00) (91% - 97%)    Parameters below as of 18 Dec 2022 19:00  Patient On (Oxygen Delivery Method): nasal cannula  O2 Flow (L/min): 4    Daily     Daily Weight in k.1 (19 Dec 2022 07:15)    GENERAL:  No acute distress  HEENT:  NCAT, + scleral icterus   CHEST:  no respiratory distress  HEART:  Regular rate and rhythm  ABDOMEN:  Soft, non-tender, non-distended   EXTREMITIES: No edema  SKIN:  No rash/erythema/ecchymoses. +jaundice  NEURO:  Alert and oriented, lethargic     LABS:                        9.7    12.69 )-----------( 166      ( 19 Dec 2022 02:30 )             28.4     Mean Cell Volume: 80.7 fL (- @ 02:30)        132<L>  |  96<L>  |  65<H>  ----------------------------<  94  4.8   |  16<L>  |  5.54<H>    Ca    9.1      19 Dec 2022 02:30  Phos  6.8       Mg     2.30         TPro  5.3<L>  /  Alb  2.0<L>  /  TBili  33.3<H>  /  DBili  x   /  AST  444<H>  /  ALT  109<H>  /  AlkPhos  392<H>      LIVER FUNCTIONS - ( 19 Dec 2022 02:30 )  Alb: 2.0 g/dL / Pro: 5.3 g/dL / ALK PHOS: 392 U/L / ALT: 109 U/L / AST: 444 U/L / GGT: x           PT/INR - ( 19 Dec 2022 02:30 )   PT: 71.0 sec;   INR: 6.01 ratio         PTT - ( 19 Dec 2022 02:30 )  PTT:68.8 sec  Urinalysis Basic - ( 18 Dec 2022 10:39 )    Color: Katina / Appearance: Turbid / S.021 / pH: x  Gluc: x / Ketone: Trace  / Bili: Large / Urobili: <2 mg/dL   Blood: x / Protein: 100 mg/dL / Nitrite: Negative   Leuk Esterase: Negative / RBC: 10 /HPF / WBC 2 /HPF   Sq Epi: x / Non Sq Epi: 1 /HPF / Bacteria: Few      Amylase Serum--      Lipase serum--       [id number]                          9.7    12.69 )-----------( 166      ( 19 Dec 2022 02:30 )             28.4                         9.4    10.83 )-----------( 201      ( 18 Dec 2022 19:45 )             27.8                         8.7    8.97  )-----------( 152      ( 18 Dec 2022 05:10 )             24.5                         9.5    11.63 )-----------( 196      ( 17 Dec 2022 03:45 )             27.1                         9.6    10.52 )-----------( 181      ( 16 Dec 2022 17:42 )             27.6       Imaging:

## 2022-12-20 NOTE — PROGRESS NOTE ADULT - PROBLEM SELECTOR PLAN 1
Pt with severe DEMARCO in the setting of hypotension, decreased PO intake, and hyperbilirubinemia. Pt denies any prior known history of kidney disease. Upon review of [facility], Scr was initially 7.29 on 12/15/22. UA shows hematuria, and proteinuria. Bilirubin level significantly elevated at 32.7. No evidence of obstruction on CT abdomen. 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 Pt received first session of HD session on 12/15/22 for hyperkalemia and worsening metabolic acidosis. Spot urine TP/CR is elevated at 1.1. Renal US ruled out hydronephrosis. Labs reviewed. Last HD was yesterday (12/19/22), however noted to have catheter issues. No plan for HD today. Will assess need for HD daily. Monitor labs and urine output. Dose medications as per eGFR.

## 2022-12-20 NOTE — PROGRESS NOTE ADULT - SUBJECTIVE AND OBJECTIVE BOX
SURGERY DAILY PROGRESS NOTE:     SUBJECTIVE/24hr Events:     Patient seen and examined on am rounds. LFTs uptrending.    MEDICATIONS  (STANDING):  chlorhexidine 2% Cloths 1 Application(s) Topical <User Schedule>  dextrose 50% Injectable 25 Gram(s) IV Push once  dextrose 50% Injectable 12.5 Gram(s) IV Push once  dextrose 50% Injectable 25 Gram(s) IV Push once  dextrose Oral Gel 15 Gram(s) Oral once  glucagon  Injectable 1 milliGRAM(s) IntraMuscular once  hydrocortisone sodium succinate Injectable 50 milliGRAM(s) IV Push every 6 hours  meropenem  IVPB 500 milliGRAM(s) IV Intermittent every 24 hours  midodrine 30 milliGRAM(s) Oral every 8 hours  norepinephrine Infusion 0.05 MICROgram(s)/kG/Min (7.16 mL/Hr) IV Continuous <Continuous>    MEDICATIONS  (PRN):      Vital Signs Last 24 Hrs  T(C): 36.6 (21 Dec 2022 12:00), Max: 37 (21 Dec 2022 00:00)  T(F): 97.9 (21 Dec 2022 12:00), Max: 98.6 (21 Dec 2022 00:00)  HR: 74 (21 Dec 2022 15:00) (74 - 94)  BP: 103/61 (21 Dec 2022 15:00) (97/50 - 145/80)  BP(mean): 74 (21 Dec 2022 15:00) (58 - 94)  RR: 22 (21 Dec 2022 15:00) (17 - 36)  SpO2: 96% (21 Dec 2022 15:00) (92% - 97%)    Parameters below as of 21 Dec 2022 15:00  Patient On (Oxygen Delivery Method): nasal cannula w/ humidification  O2 Flow (L/min): 2        I&O's Detail    20 Dec 2022 07:01  -  21 Dec 2022 07:00  --------------------------------------------------------  IN:    Norepinephrine: 1145 mL  Total IN: 1145 mL    OUT:    Indwelling Catheter - Urethral (mL): 270 mL  Total OUT: 270 mL    Total NET: 875 mL      21 Dec 2022 07:01  -  21 Dec 2022 15:46  --------------------------------------------------------  IN:    Norepinephrine: 352 mL    Other (mL): 400 mL  Total IN: 752 mL    OUT:    Indwelling Catheter - Urethral (mL): 40 mL    Other (mL): 400 mL  Total OUT: 440 mL    Total NET: 312 mL            Daily     Daily Weight in k.8 (21 Dec 2022 08:15)          LABS:                        9.9    20.75 )-----------( 125      ( 21 Dec 2022 04:19 )             29.7         132<L>  |  95<L>  |  71<H>  ----------------------------<  94  5.4<H>   |  13<L>  |  5.69<H>    Ca    8.7      21 Dec 2022 04:19  Phos  8.4       Mg     2.50         TPro  5.5<L>  /  Alb  2.1<L>  /  TBili  34.9<H>  /  DBili  x   /  AST  >7000<H>  /  ALT  1946<H>  /  AlkPhos  797<H>      PT/INR - ( 21 Dec 2022 08:46 )   PT: 17.7 sec;   INR: 1.52 ratio         PTT - ( 21 Dec 2022 08:46 )  PTT:47.6 sec

## 2022-12-20 NOTE — PROGRESS NOTE ADULT - ATTENDING COMMENTS
Patient seen and examined with the GI fellow. I agree with the above assessment and plan. Thank you for allowing us to care for your patient.    Plan for MRCP followed by IR-FRIEDA

## 2022-12-20 NOTE — PROGRESS NOTE ADULT - SUBJECTIVE AND OBJECTIVE BOX
[facility] DIVISION OF KIDNEY DISEASES AND HYPERTENSION -- FOLLOW UP NOTE  --------------------------------------------------------------------------------  HPI: 66 yo F with history of uterine cancer, received chemotherapy and radiation (last treatment ~4 months ago at [place]) initially presented to [facility] on 12/14 for painless jaundice, decreased appetite, nausea/vomiting, and generalized weakness. 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 Pt was transferred to [facility] on 12/15 for GI evaluation and possible ERCP. Upon review of [place], Scr was initially 7.29 on 12/15/22, and is elevated but improved to 5.65 with IV fluids. No prior labs are available for review. Exact duration of Scr elevation is unclear. Pt received HD session on 12/15/22 for hyperkalemia and worsening metabolic acidosis. Nephrology was consulted for DEMARCO with hyperkalemia.    Pt was seen and evaluated this morning in the CTICU. Pt is lethargic and not very verbally responsive. Unable to obtain ROS.     PAST HISTORY  --------------------------------------------------------------------------------  No significant changes to PMH, PSH, FHx, SHx, unless otherwise noted    ALLERGIES & MEDICATIONS  --------------------------------------------------------------------------------  Allergies    No Known Allergies    Intolerances      Standing Inpatient Medications  chlorhexidine 2% Cloths 1 Application(s) Topical <User Schedule>  dextrose 50% Injectable 25 Gram(s) IV Push once  dextrose 50% Injectable 12.5 Gram(s) IV Push once  dextrose 50% Injectable 25 Gram(s) IV Push once  dextrose Oral Gel 15 Gram(s) Oral once  glucagon  Injectable 1 milliGRAM(s) IntraMuscular once  hydrocortisone sodium succinate Injectable 50 milliGRAM(s) IV Push every 6 hours  meropenem  IVPB 500 milliGRAM(s) IV Intermittent every 24 hours  midodrine 30 milliGRAM(s) Oral every 8 hours  norepinephrine Infusion 0.05 MICROgram(s)/kG/Min IV Continuous <Continuous>    PRN Inpatient Medications      REVIEW OF SYSTEMS  --------------------------------------------------------------------------------    All other systems were reviewed and are negative, except as noted.    VITALS/PHYSICAL EXAM  --------------------------------------------------------------------------------  T(C): 36.8 (12-20-22 @ 04:00), Max: 37 (12-19-22 @ 20:00)  HR: 82 (12-20-22 @ 09:00) (76 - 93)  BP: 104/67 (12-20-22 @ 09:00) (90/54 - 122/62)  RR: 17 (12-20-22 @ 09:00) (17 - 32)  SpO2: 95% (12-20-22 @ 09:00) (95% - 99%)  Wt(kg): --    12-19-22 @ 07:01  -  12-20-22 @ 07:00  --------------------------------------------------------  IN: 1529.6 mL / OUT: 452 mL / NET: 1077.6 mL    12-20-22 @ 07:01  -  12-20-22 @ 09:59  --------------------------------------------------------  IN: 114.6 mL / OUT: 0 mL / NET: 114.6 mL    Physical Exam:  	Gen: ill appearing  	HEENT: Scleral icterus  	Pulm: CTA B/L  	CV: S1S2  	Abd: Soft, +BS   	Ext: trace LE edema B/L  	Neuro: Awake but lethargic  	Skin: jaundice  	Vascular access: RIJ non-tunneled HD catheter    LABS/STUDIES  --------------------------------------------------------------------------------              10.0   16.30 >-----------<  124      [12-20-22 @ 01:30]              29.0     131  |  95  |  55  ----------------------------<  93      [12-20-22 @ 01:30]  4.4   |  17  |  4.70        Ca     8.8     [12-20-22 @ 01:30]      Mg     2.30     [12-20-22 @ 01:30]      Phos  6.5     [12-20-22 @ 01:30]    TPro  5.3  /  Alb  2.1  /  TBili  34.4  /  DBili  x   /  AST  1728  /  ALT  423  /  AlkPhos  405  [12-20-22 @ 01:30]    PT/INR: PT 19.5 , INR 1.67       [12-20-22 @ 01:30]  PTT: 44.4       [12-20-22 @ 01:30]          [12-19-22 @ 02:30]    Creatinine Trend:  SCr 4.70 [12-20 @ 01:30]  SCr 5.54 [12-19 @ 02:30]  SCr 5.25 [12-18 @ 21:22]  SCr 5.19 [12-18 @ 19:45]  SCr 4.51 [12-18 @ 05:10]    Urinalysis - [12-18-22 @ 10:39]      Color Katina / Appearance Turbid / SG 1.021 / pH 6.0      Gluc Negative / Ketone Trace  / Bili Large / Urobili <2 mg/dL       Blood Large / Protein 100 mg/dL / Leuk Est Negative / Nitrite Negative      RBC 10 / WBC 2 / Hyaline  / Gran  / Sq Epi  / Non Sq Epi 1 / Bacteria Few    Urine Creatinine 115      [12-18-22 @ 10:39]  Urine Protein 116      [12-15-22 @ 20:30]  Urine Sodium 42      [12-18-22 @ 10:39]  Urine Urea Nitrogen 394.0      [12-18-22 @ 10:39]  Urine Potassium 40.7      [12-15-22 @ 20:30]  Urine Osmolality 354      [12-15-22 @ 20:30]    TSH 0.28      [12-19-22 @ 07:00]    HBsAb <3.0      [12-15-22 @ 23:40]  HBsAg Nonreact      [12-15-22 @ 23:40]  HBcAb Nonreact      [12-15-22 @ 23:40]     [facility] DIVISION OF KIDNEY DISEASES AND HYPERTENSION -- FOLLOW UP NOTE  --------------------------------------------------------------------------------  HPI: 68 yo F with history of uterine cancer, received chemotherapy and radiation (last treatment ~4 months ago at [place]) initially presented to [facility] on 12/14 for painless jaundice, decreased appetite, nausea/vomiting, and generalized weakness. 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 Pt was transferred to [facility] on 12/15 for GI evaluation and possible ERCP. Upon review of [place], Scr was initially 7.29 on 12/15/22. Pt received HD session on 12/15/22 for hyperkalemia and worsening metabolic acidosis.  Last HD session was 12/19 limited catheter flow.    Pt was seen and evaluated this morning in the CTICU. Pt is lethargic and not very verbally responsive. Unable to obtain ROS.     PAST HISTORY  --------------------------------------------------------------------------------  No significant changes to PMH, PSH, FHx, SHx, unless otherwise noted    ALLERGIES & MEDICATIONS  --------------------------------------------------------------------------------  Allergies    No Known Allergies    Intolerances      Standing Inpatient Medications  chlorhexidine 2% Cloths 1 Application(s) Topical <User Schedule>  dextrose 50% Injectable 25 Gram(s) IV Push once  dextrose 50% Injectable 12.5 Gram(s) IV Push once  dextrose 50% Injectable 25 Gram(s) IV Push once  dextrose Oral Gel 15 Gram(s) Oral once  glucagon  Injectable 1 milliGRAM(s) IntraMuscular once  hydrocortisone sodium succinate Injectable 50 milliGRAM(s) IV Push every 6 hours  meropenem  IVPB 500 milliGRAM(s) IV Intermittent every 24 hours  midodrine 30 milliGRAM(s) Oral every 8 hours  norepinephrine Infusion 0.05 MICROgram(s)/kG/Min IV Continuous <Continuous>    PRN Inpatient Medications      REVIEW OF SYSTEMS  --------------------------------------------------------------------------------    unable to obtain    VITALS/PHYSICAL EXAM  --------------------------------------------------------------------------------  T(C): 36.8 (12-20-22 @ 04:00), Max: 37 (12-19-22 @ 20:00)  HR: 82 (12-20-22 @ 09:00) (76 - 93)  BP: 104/67 (12-20-22 @ 09:00) (90/54 - 122/62)  RR: 17 (12-20-22 @ 09:00) (17 - 32)  SpO2: 95% (12-20-22 @ 09:00) (95% - 99%)  Wt(kg): --    12-19-22 @ 07:01  -  12-20-22 @ 07:00  --------------------------------------------------------  IN: 1529.6 mL / OUT: 452 mL / NET: 1077.6 mL    12-20-22 @ 07:01  -  12-20-22 @ 09:59  --------------------------------------------------------  IN: 114.6 mL / OUT: 0 mL / NET: 114.6 mL    Physical Exam:  	Gen: ill appearing  	HEENT: Scleral icterus  	Pulm: CTA B/L  	CV: S1S2  	Abd: Soft, +BS   	Ext: trace LE edema B/L  	Neuro: Awake but lethargic  	Skin: jaundice  	Vascular access: RI non-tunneled HD catheter    LABS/STUDIES  --------------------------------------------------------------------------------              10.0   16.30 >-----------<  124      [12-20-22 @ 01:30]              29.0     131  |  95  |  55  ----------------------------<  93      [12-20-22 @ 01:30]  4.4   |  17  |  4.70        Ca     8.8     [12-20-22 @ 01:30]      Mg     2.30     [12-20-22 @ 01:30]      Phos  6.5     [12-20-22 @ 01:30]    TPro  5.3  /  Alb  2.1  /  TBili  34.4  /  DBili  x   /  AST  1728  /  ALT  423  /  AlkPhos  405  [12-20-22 @ 01:30]    PT/INR: PT 19.5 , INR 1.67       [12-20-22 @ 01:30]  PTT: 44.4       [12-20-22 @ 01:30]          [12-19-22 @ 02:30]    Creatinine Trend:  SCr 4.70 [12-20 @ 01:30]  SCr 5.54 [12-19 @ 02:30]  SCr 5.25 [12-18 @ 21:22]  SCr 5.19 [12-18 @ 19:45]  SCr 4.51 [12-18 @ 05:10]    Urinalysis - [12-18-22 @ 10:39]      Color Katina / Appearance Turbid / SG 1.021 / pH 6.0      Gluc Negative / Ketone Trace  / Bili Large / Urobili <2 mg/dL       Blood Large / Protein 100 mg/dL / Leuk Est Negative / Nitrite Negative      RBC 10 / WBC 2 / Hyaline  / Gran  / Sq Epi  / Non Sq Epi 1 / Bacteria Few    Urine Creatinine 115      [12-18-22 @ 10:39]  Urine Protein 116      [12-15-22 @ 20:30]  Urine Sodium 42      [12-18-22 @ 10:39]  Urine Urea Nitrogen 394.0      [12-18-22 @ 10:39]  Urine Potassium 40.7      [12-15-22 @ 20:30]  Urine Osmolality 354      [12-15-22 @ 20:30]    TSH 0.28      [12-19-22 @ 07:00]    HBsAb <3.0      [12-15-22 @ 23:40]  HBsAg Nonreact      [12-15-22 @ 23:40]  HBcAb Nonreact      [12-15-22 @ 23:40]     ,DirectAddress_Unknown

## 2022-12-20 NOTE — CHART NOTE - NSCHARTNOTEFT_GEN_A_CORE
- IR consulted for percutaneous placement of biliary and cholecystostomy tubes.  - Case was discussed with MICU team and IR attending Dr. Atkins.  - Patient is unstable for CT imaging and there are limited treatment options available at this point.  - Placement of a cholecystostomy tube at bedside may provide limited benefit and will likely not prolong survival.  - Given ongoing palliative care discussions, it is unclear whether these treatment options will be pursued.   - No planned intervention as of now. IR will continue to follow.

## 2022-12-20 NOTE — PROGRESS NOTE ADULT - ASSESSMENT
68 y/o F p/w fatigue and sepsis in setting of biliary malignancy.    -Pt not a surgical candidate at this time  -GI/IR following for possible perc scooby  -Ongoing GOC conversations  -Care per MICU     Surgical Oncology  #[id number]

## 2022-12-20 NOTE — PROGRESS NOTE ADULT - ASSESSMENT
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 Plan for eventual MRCP per GI.     #Neuro  A&Ox3  - Pt is now very lethargic i/s/o renal failure vs probable cholangiocarcinoma with metastasis   - elevated BUN in the setting of renal failure  - ammonia 47 this AM, trend ammonia in AM, will initiate lactulose if necessary  - on zosyn for possible infection.  - ABG without hypercapnia  - although neuro exam not indicative of local stroke and w/o hx of brain mets, in the setting of increased coagulopathy will need to consider CTH if neuro exam changes    #CV  - patient with worsening oncotic pressure and vasoplegia secondary to worsening liver function in the setting of possible septic shock  - will discontinue midodrine 30 q8 due to bradycardia in the 30s  - titrate levophed to sustain MAP>65  - patient also with cardiac dysfunction with regards to troponinemia with ABBE not a candidate for apt, AC, cath. should keep Hgb> 10 due to active ischemia  - repeat formal echo    #PULMONARY   CT with consolidation in lingula and RML - atelectasis vs. pneumonia  CXR with right base atelectasis vs infiltrate   - Saturating well on room air  - continue to monitor with worsening mental; status     #INFECTIOUS DISEASE   - Afebrile, no leukocytosis, RVP negative however consolidation noted in lingula/RML atelectasis vs. 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 Per outpatient oncologist, had progressively worsening presumed uterine cancer. Outpatient, patient was evaled by oncologist with worsening abdominal mass and was recommended for CT. Received CT at hospital revealing new cholangiocarcinoma. Was planning hospice and chemo was stopped as uterine cancer was refractory to treatment  - Surgery on board  - Gi recommending MR MRCP pending vs  IR for more urgent biliary decompression (+/- biopsy) however likely too unstable will discuss with family and IR   - IR notes no indication for tube at this time  - Ca 199 not elevated, CEA not elevated,  elevated at 1031  - Pain management as needed   - CTM liver function  - NPO     #RENAL  No history of kidney disease, admission SCr 7.29 in Acute Renal Failure with hyperkalemia, AGMA  - possible ATN vs bile cast nephropathy vs pre-renal etiology.   - Urgent HD 12/15, plan for urgent HD 12/16 as well   - Urine protein, urine creatinine, and urine sodium wnl   - renal US without hydro  - Strict I&O's     #Endocrine   Enlarged adrenal glands  - cortisol remains at 14  - thyroid studies to be added on    #Hematology   Noted to have vaginal bleeding, recent epistaxis in setting of uremia. Improving s/p dialysis  INR elevated with mild bleeding from site, Monitor coags, vit K 10 today, add on fibrinogen  maintain active type and screen, keep Hgb >10 given active ischemia  possible PVT on renal US, will need to follow up  Onc to weigh in on second opinion.    #DVT AND GI PROPHYLAXIS  Hold chemo PPX iso uremic platelet dysfunction  SCDs    #Code Status: Full code. Daughter is NOK per patient   Dr. Brewster and KELVIN Nixon spoke with daughter 12/16. Daughter would like to continue with scanning and testing for now.   Onc consulted today for second opinion. No further treatment from onc. If pt improves, can go to [facility]    67 year old female with Stage IV uterine cancer h/o chemotherapy refractory to therapy and was on home hospice with admitted with painless jaundice, imaging concerning for cholangiocarcinoma in MICU for new HD in setting of acute renal failure thought to be from bile cast nephropathy. Plan for eventual MRCP per GI.     #Neuro  A&Ox3  - Pt is now very lethargic i/s/o renal failure vs probable cholangiocarcinoma with metastasis   - elevated BUN in the setting of renal failure  - ammonia 47 this AM, trend ammonia in AM, will initiate lactulose if necessary  - on meropenem for possible infection.  - ABG without hypercapnia  - although neuro exam not indicative of local stroke and w/o hx of brain mets, in the setting of increased coagulopathy will need to consider CTH if neuro exam changes, had outpatient MRI 10/4/2021 w/o evidence of intracranial metases, had some chronic microvascular diseases  for pain control only tolerating decreased levels of dilaudid will continue intermittent 0.25 dilaudid IVP as necessary      #CV  - patient with worsening oncotic pressure and vasoplegia secondary to worsening liver function in the setting of possible septic shock  - restarted midodrine 30 q8  - titrate levophed to sustain MAP>65  - patient also with cardiac dysfunction with regards to troponinemia with ABBE not a candidate for apt, AC, cath. should keep Hgb> 10 due to active ischemia  - repeat formal echo: grossly normal LV SF, RVSF, no WMA or diastolic dysfxn  - stress dose steroids 50 q6 started 12/20: has mid day cortisol level indeterminate range, increasing pressors, borderline low sugars,     #PULMONARY   CT with consolidation in lingula and RML - atelectasis vs. pneumonia  CXR with right base atelectasis vs infiltrate   - Saturating well on room air  - continue to monitor with worsening mental; status     #INFECTIOUS DISEASE   - Afebrile, no leukocytosis, RVP negative however consolidation noted in lingula/RML atelectasis vs. 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 Per outpatient oncologist, had progressively worsening presumed uterine cancer. Outpatient, patient was evaled by oncologist with worsening abdominal mass and was recommended for CT. Received CT at hospital revealing new cholangiocarcinoma. Was planning hospice and chemo was stopped as uterine cancer was refractory to treatment  - Surgery on board  - Gi recommending MR MRCP pending vs  IR for more urgent biliary decompression (+/- biopsy) however likely too unstable will discuss with family and IR   - IR notes no indication for tube at this time  - Ca 199 not elevated, CEA not elevated,  elevated at 1031  - Pain management as needed   - CTM liver function  - NPO   - obtain RUQ ultrasound    #RENAL  No history of kidney disease, admission SCr 7.29 in Acute Renal Failure with hyperkalemia, AGMA  - possible ATN vs bile cast nephropathy vs pre-renal etiology.   - Urgent HD 12/15, plan for urgent HD 12/16 as well   - Urine protein, urine creatinine, and urine sodium wnl   - renal US without hydro  - Strict I&O's   - will adjust shiley 12/20    #Endocrine   Enlarged adrenal glands  - cortisol remains at 14  - thyroid studies to be added on  - stress dose steroids 50 q6 started 12/20: has mid day cortisol level indeterminate range, increasing pressors, borderline low sugars,     #Hematology   Noted to have vaginal bleeding, recent epistaxis in setting of uremia. Improving s/p dialysis  INR elevated with mild bleeding from site, Monitor coags, improved after vit K  maintain active type and screen, keep Hgb >10 given active ischemia  possible PVT on renal US, will need to follow up  Onc to weigh in on second opinion.    #DVT AND GI PROPHYLAXIS  Hold chemo PPX iso uremic platelet dysfunction  SCDs    #Code Status: Full code. Daughter is NOK per patient   Dr. Brewster and KELVIN Nixon spoke with daughter 12/16. Daughter would like to continue with scanning and testing for now.   Onc consulted for second opinion, gave records. No further treatment from onc. If pt improves, can go to [facility]    67 year old female with Stage IV carcinosarcoma dx in 2020 s/p TAHBSO, paclitaxel/carboplatin, vaginal brachytherapy, doxorubicin, pembrolizumab and lenvima with most recent CT scan revealing POD and polyp from colonoscopy revealing metastatic adenocarcinoma of GYN origin and thus on home hospice with admitted with painless jaundice, lethargy, imaging concerning for cholangiocarcinoma in MICU for new HD in setting of acute renal failure thought to be from bile cast nephropathy now with progressive kidney and liver failure requiring IV pressors. Plan for eventual MRCP per GI.     #Neuro  A&Ox3  - Pt is now very lethargic i/s/o renal failure vs probable cholangiocarcinoma with metastasis   - elevated BUN in the setting of renal failure  - ammonia 47 this AM, trend ammonia in AM, will initiate lactulose if necessary  - on meropenem for possible infection.  - ABG without hypercapnia  - although neuro exam not indicative of local stroke and w/o hx of brain mets, in the setting of increased coagulopathy will need to consider CTH if neuro exam changes, had outpatient MRI 10/4/2021 w/o evidence of intracranial metases, had some chronic microvascular diseases  for pain control only tolerating decreased levels of dilaudid will continue intermittent 0.25 dilaudid IVP as necessary      #CV  - patient with worsening oncotic pressure and vasoplegia secondary to worsening liver function in the setting of possible septic shock  - restarted midodrine 30 q8  - titrate levophed to sustain MAP>65  - patient also with cardiac dysfunction with regards to troponinemia with ABBE not a candidate for apt, AC, cath. should keep Hgb> 10 due to active ischemia  - repeat formal echo: grossly normal LV SF, RVSF, no WMA or diastolic dysfxn  - stress dose steroids 50 q6 started 12/20: has mid day cortisol level indeterminate range, increasing pressors, borderline low sugars,     #PULMONARY   CT with consolidation in lingula and RML - atelectasis vs. pneumonia  CXR with right base atelectasis vs infiltrate   - Saturating well on room air  - continue to monitor with worsening mental; status     #INFECTIOUS DISEASE   - Afebrile, no leukocytosis, RVP negative however consolidation noted in lingula/RML atelectasis vs. 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 Per outpatient oncologist, had progressively worsening presumed uterine cancer. Outpatient, patient was evaled by oncologist with worsening abdominal mass and was recommended for CT. Received CT at hospital revealing new cholangiocarcinoma. Was planning hospice and chemo was stopped as uterine cancer was refractory to treatment  - Surgery on board  - Gi recommending MR MRCP pending vs  IR for more urgent biliary decompression (+/- biopsy) however likely too unstable will discuss with family and IR   - IR notes no indication for tube at this time  - Ca 199 not elevated, CEA not elevated,  elevated at 1031  - Pain management as needed   - CTM liver function  - NPO   - obtain RUQ ultrasound    #RENAL  No history of kidney disease, admission SCr 7.29 in Acute Renal Failure with hyperkalemia, AGMA  - possible ATN vs bile cast nephropathy vs pre-renal etiology.   - Urgent HD 12/15, plan for urgent HD 12/16 as well   - Urine protein, urine creatinine, and urine sodium wnl   - renal US without hydro  - Strict I&O's   - will adjust shiley 12/20    #Endocrine   Enlarged adrenal glands  - cortisol remains at 14  - thyroid studies to be added on  - stress dose steroids 50 q6 started 12/20: has mid day cortisol level indeterminate range, increasing pressors, borderline low sugars,     #Hematology   Noted to have vaginal bleeding, recent epistaxis in setting of uremia. Improving s/p dialysis  INR elevated with mild bleeding from site, Monitor coags, improved after vit K  maintain active type and screen, keep Hgb >10 given active ischemia  possible PVT on renal US, will need to follow up  Onc to weigh in on second opinion.    #DVT AND GI PROPHYLAXIS  Hold chemo PPX iso uremic platelet dysfunction  SCDs    #Code Status: Full code. Daughter is NOK per patient   Dr. Brewster and KELVIN Nixon spoke with daughter 12/16. Daughter would like to continue with scanning and testing for now.   Onc consulted for second opinion, gave records. No further treatment from onc. If pt improves, can go to [facility].

## 2022-12-20 NOTE — PROGRESS NOTE ADULT - SUBJECTIVE AND OBJECTIVE BOX
CHIEF COMPLAINT:    Interval Events:    HPI:  Ms. Mckinney is a 67 F PMH uterine ca (s/p hysterectomy, chemo/RT, in remission as of 4 months ago), BIBEMS to [place], p/w 2 weeks painless jaundice and progressive failure to thrive, 1 day epistaxis and vaginal bleeding; found to have likely metastatic cholangiocarcinoma and acute renal failure (K 5.8, Cr 7.29); transferred to [facility] ED for further management.     Patient was at usual state of health performing IADLs until 2 weeks prior to admission when she developed scleral icterus, pruritus, intolerance of solid food, and reduced PO intake. She endorsed a feeling of prolonged fullness with meals that would eventually on occasion prompt her to induce vomiting. Last had broth on  lunch 1 day prior to admission; had tea this morning. Also endorsed HERNANDEZ, "puffy" abdomen, light colored stools (at bedside), +dark urine. No fevers, chills, confusion, tremors, abdominal pain, sick contacts, bloody vomit/bloody stool, petechial rash/bruising/other bleeding.    Notably K 5.9 at OSH/ED, received calcium gluconate and Lokelma.    (15 Dec 2022 17:50)    SUBJECTIVE: Patient seen and examined at bedside.     minimal ROS able to be obtained due to poor energy however Patient denies fevers, chills, chest pain, shortness of breath, abdominal pain, diarrhea, constipation, dysuria, leg swelling, headache, light headedness        OBJECTIVE:  ICU Vital Signs Last 24 Hrs  T(C): 36.8 (20 Dec 2022 04:00), Max: 37.1 (19 Dec 2022 07:05)  T(F): 98.3 (20 Dec 2022 04:00), Max: 98.7 (19 Dec 2022 07:05)  HR: 84 (20 Dec 2022 06:00) (76 - 93)  BP: 105/74 (20 Dec 2022 05:00) (90/54 - 129/66)  BP(mean): 83 (20 Dec 2022 05:00) (65 - 83)  ABP: --  ABP(mean): --  RR: 29 (20 Dec 2022 06:00) (0 - 32)  SpO2: 99% (20 Dec 2022 06:00) (95% - 99%)    O2 Parameters below as of 20 Dec 2022 06:00  Patient On (Oxygen Delivery Method): room air               @ 07:  -   @ 07:00  --------------------------------------------------------  IN: 707.2 mL / OUT: 345 mL / NET: 362.2 mL     @ 07:  -   @ 06:29  --------------------------------------------------------  IN: 1415 mL / OUT: 452 mL / NET: 963 mL      CAPILLARY BLOOD GLUCOSE      POCT Blood Glucose.: 97 mg/dL (18 Dec 2022 12:15)      PHYSICAL EXAM:    General: NAD  HEENT: NC/AT; PERRL, jaundiced sclera  Neck: supple  Respiratory: CTA b/l  Cardiovascular: +S1/S2; RRR  Abdomen: soft, NT/ND; +BS x4  Extremities: WWP, 2+ peripheral pulses b/l; mild anasarca  Skin: normal color and turgor; jaundice  Neurological: ao2-3, lethargic        HOSPITAL MEDICATIONS:  Standing Meds:  chlorhexidine 2% Cloths 1 Application(s) Topical <User Schedule>  dextrose 50% Injectable 25 Gram(s) IV Push once  dextrose 50% Injectable 12.5 Gram(s) IV Push once  dextrose 50% Injectable 25 Gram(s) IV Push once  dextrose Oral Gel 15 Gram(s) Oral once  glucagon  Injectable 1 milliGRAM(s) IntraMuscular once  midodrine 30 milliGRAM(s) Oral every 8 hours  norepinephrine Infusion 0.05 MICROgram(s)/kG/Min IV Continuous <Continuous>  piperacillin/tazobactam IVPB.. 3.375 Gram(s) IV Intermittent every 12 hours      PRN Meds:      LABS:                        10.0   16.30 )-----------( 124      ( 20 Dec 2022 01:30 )             29.0     Hgb Trend: 10.0<--, 9.7<--, 9.4<--, 8.7<--, 9.5<--  12-20    131<L>  |  95<L>  |  55<H>  ----------------------------<  93  4.4   |  17<L>  |  4.70<H>    Ca    8.8      20 Dec 2022 01:30  Phos  6.5       Mg     2.30         TPro  5.3<L>  /  Alb  2.1<L>  /  TBili  34.4<H>  /  DBili  x   /  AST  1728<H>  /  ALT  423<H>  /  AlkPhos  405<H>      Creatinine Trend: 4.70<--, 5.54<--, 5.25<--, 5.19<--, 4.51<--, 4.88<--  PT/INR - ( 20 Dec 2022 01:30 )   PT: 19.5 sec;   INR: 1.67 ratio         PTT - ( 20 Dec 2022 01:30 )  PTT:44.4 sec  Urinalysis Basic - ( 18 Dec 2022 10:39 )    Color: Katina / Appearance: Turbid / S.021 / pH: x  Gluc: x / Ketone: Trace  / Bili: Large / Urobili: <2 mg/dL   Blood: x / Protein: 100 mg/dL / Nitrite: Negative   Leuk Esterase: Negative / RBC: 10 /HPF / WBC 2 /HPF   Sq Epi: x / Non Sq Epi: 1 /HPF / Bacteria: Few      Arterial Blood Gas:   @ 02:30  7.39/31/155/19/100.0/-5.2  ABG lactate: --    Venous Blood Gas:   @ 01:30  7.36/36/56/20/85.6  VBG Lactate: --      MICROBIOLOGY:     RADIOLOGY:  [ ] Reviewed and interpreted by me         CHIEF COMPLAINT:    Interval Events:  pressor requirement increased to 0.4  patient with abdominal pain      HPI:  Ms. Mckinney is a 67 F PMH uterine ca (s/p hysterectomy, chemo/RT, in remission as of 4 months ago), BIBEMS to [place], p/w 2 weeks painless jaundice and progressive failure to thrive, 1 day epistaxis and vaginal bleeding; found to have likely metastatic cholangiocarcinoma and acute renal failure (K 5.8, Cr 7.29); transferred to [facility] ED for further management.     Patient was at usual state of health performing IADLs until 2 weeks prior to admission when she developed scleral icterus, pruritus, intolerance of solid food, and reduced PO intake. She endorsed a feeling of prolonged fullness with meals that would eventually on occasion prompt her to induce vomiting. Last had broth on  lunch 1 day prior to admission; had tea this morning. Also endorsed HERNANDEZ, "puffy" abdomen, light colored stools (at bedside), +dark urine. No fevers, chills, confusion, tremors, abdominal pain, sick contacts, bloody vomit/bloody stool, petechial rash/bruising/other bleeding.    Notably K 5.9 at OSH/ED, received calcium gluconate and Lokelma.    (15 Dec 2022 17:50)    SUBJECTIVE: Patient seen and examined at bedside. this morning with abdominal pain    minimal ROS able to be obtained due to somnolence        OBJECTIVE:  ICU Vital Signs Last 24 Hrs  T(C): 36.8 (20 Dec 2022 04:00), Max: 37.1 (19 Dec 2022 07:05)  T(F): 98.3 (20 Dec 2022 04:00), Max: 98.7 (19 Dec 2022 07:05)  HR: 84 (20 Dec 2022 06:00) (76 - 93)  BP: 105/74 (20 Dec 2022 05:00) (90/54 - 129/66)  BP(mean): 83 (20 Dec 2022 05:00) (65 - 83)  ABP: --  ABP(mean): --  RR: 29 (20 Dec 2022 06:00) (0 - 32)  SpO2: 99% (20 Dec 2022 06:00) (95% - 99%)    O2 Parameters below as of 20 Dec 2022 06:00  Patient On (Oxygen Delivery Method): room air              12-18 @ :  -   @ 07:00  --------------------------------------------------------  IN: 707.2 mL / OUT: 345 mL / NET: 362.2 mL     @ 07:  -   @ 06:29  --------------------------------------------------------  IN: 1415 mL / OUT: 452 mL / NET: 963 mL      CAPILLARY BLOOD GLUCOSE      POCT Blood Glucose.: 97 mg/dL (18 Dec 2022 12:15)      PHYSICAL EXAM:    General: NAD  HEENT: NC/AT; PERRL, jaundiced sclera  Neck: supple  Respiratory: CTA b/l  Cardiovascular: +S1/S2; RRR  Abdomen: soft, NT/ND; +BS x4  Extremities: WWP, 2+ peripheral pulses b/l; mild anasarca  Skin: normal color and turgor; jaundice  Neurological: ao2-3, lethargic  RUE with solid swelling proximal, distal with previous extravasation        HOSPITAL MEDICATIONS:  Standing Meds:  chlorhexidine 2% Cloths 1 Application(s) Topical <User Schedule>  dextrose 50% Injectable 25 Gram(s) IV Push once  dextrose 50% Injectable 12.5 Gram(s) IV Push once  dextrose 50% Injectable 25 Gram(s) IV Push once  dextrose Oral Gel 15 Gram(s) Oral once  glucagon  Injectable 1 milliGRAM(s) IntraMuscular once  midodrine 30 milliGRAM(s) Oral every 8 hours  norepinephrine Infusion 0.05 MICROgram(s)/kG/Min IV Continuous <Continuous>  piperacillin/tazobactam IVPB.. 3.375 Gram(s) IV Intermittent every 12 hours      PRN Meds:      LABS:                        10.0   16.30 )-----------( 124      ( 20 Dec 2022 01:30 )             29.0     Hgb Trend: 10.0<--, 9.7<--, 9.4<--, 8.7<--, 9.5<--  12-20    131<L>  |  95<L>  |  55<H>  ----------------------------<  93  4.4   |  17<L>  |  4.70<H>    Ca    8.8      20 Dec 2022 01:30  Phos  6.5       Mg     2.30         TPro  5.3<L>  /  Alb  2.1<L>  /  TBili  34.4<H>  /  DBili  x   /  AST  1728<H>  /  ALT  423<H>  /  AlkPhos  405<H>      Creatinine Trend: 4.70<--, 5.54<--, 5.25<--, 5.19<--, 4.51<--, 4.88<--  PT/INR - ( 20 Dec 2022 01:30 )   PT: 19.5 sec;   INR: 1.67 ratio         PTT - ( 20 Dec 2022 01:30 )  PTT:44.4 sec  Urinalysis Basic - ( 18 Dec 2022 10:39 )    Color: Katina / Appearance: Turbid / S.021 / pH: x  Gluc: x / Ketone: Trace  / Bili: Large / Urobili: <2 mg/dL   Blood: x / Protein: 100 mg/dL / Nitrite: Negative   Leuk Esterase: Negative / RBC: 10 /HPF / WBC 2 /HPF   Sq Epi: x / Non Sq Epi: 1 /HPF / Bacteria: Few      Arterial Blood Gas:   @ 02:30  7.39/31/155/19/100.0/-5.2  ABG lactate: --    Venous Blood Gas:   @ 01:30  7.36/36/56/20/85.6  VBG Lactate: --      MICROBIOLOGY:     RADIOLOGY:  [ ] Reviewed and interpreted by me

## 2022-12-20 NOTE — PROGRESS NOTE ADULT - ATTENDING COMMENTS
remains critically ill with acute kidney injury.  d/w daughter at bedside this am no plans for HD today.  will monitor for kidney function recovery and assess need for dialysis daily.

## 2022-12-20 NOTE — PROGRESS NOTE ADULT - ASSESSMENT
67F Hx uterine ca (s/p hysterectomy, chemo/RT, in remission as of 4 months ago), BIBEMS to [place], p/w 2 weeks painless jaundice and progressive failure to thrive, 1 day epistaxis and vaginal bleeding; found to have likely metastatic cholangiocarcinoma and acute renal failure (K 5.8, Cr 7.29), transferred to [facility] for further management, currently in MICU.       Impression:   #Hyperbilirubinemia: Tbili 41.1 w/ worsening transaminases. Concern for biliary obstruction 2/2 malignancy  #C/f cholangiocarcinoma: CT A/P non showing abnormal gallbladder, baldomero hepatis, with marked intrahepatic ductal dilatation and multiple lesions in the liver as well as thickening of the distal stomach, proximal duodenum, and enlargement of the pancreatic head and uncinate process.     #Shock  #Acute renal failure   #Uterine CA s/p hysterectomy/chemo/RT      Recommendations  - f/u IR recs for more urgent biliary decompression +/- liver biopsy  - trend liver enzymes, c/w broad spectrum abx  - rest of care per primary team    All recommendations are tentative until note is attested by attending.     Rupesh Jones, PGY6  Gastroenterology/Hepatology Fellow  During weekdays: Available on [employer] Teams or pager:[phone] ([facility] Short Range Pager); [phone] (Long Range Pager)  Overnight/Weekend: Please page the on-call GI fellow

## 2022-12-20 NOTE — PROGRESS NOTE ADULT - SUBJECTIVE AND OBJECTIVE BOX
Gastroenterology/Hepatology Progress Note      Interval Events:   - seen by IR, planned for imaging prior to percutaneous drainage  - labs w/ worsening leukocytosis, and AST/ALT (1728/423), stable bili 34      Allergies:  No Known Allergies      Hospital Medications:  chlorhexidine 2% Cloths 1 Application(s) Topical <User Schedule>  dextrose 50% Injectable 25 Gram(s) IV Push once  dextrose 50% Injectable 12.5 Gram(s) IV Push once  dextrose 50% Injectable 25 Gram(s) IV Push once  dextrose Oral Gel 15 Gram(s) Oral once  glucagon  Injectable 1 milliGRAM(s) IntraMuscular once  midodrine 30 milliGRAM(s) Oral every 8 hours  norepinephrine Infusion 0.05 MICROgram(s)/kG/Min IV Continuous <Continuous>  piperacillin/tazobactam IVPB.. 3.375 Gram(s) IV Intermittent every 12 hours      ROS: 14 point ROS negative unless otherwise state in subjective    PHYSICAL EXAM:   Vital Signs:  Vital Signs Last 24 Hrs  T(C): 36.8 (20 Dec 2022 04:00), Max: 37 (19 Dec 2022 20:00)  T(F): 98.3 (20 Dec 2022 04:00), Max: 98.6 (19 Dec 2022 20:00)  HR: 84 (20 Dec 2022 06:00) (76 - 93)  BP: 105/74 (20 Dec 2022 05:00) (90/54 - 129/66)  BP(mean): 83 (20 Dec 2022 05:00) (65 - 83)  RR: 29 (20 Dec 2022 06:00) (17 - 32)  SpO2: 99% (20 Dec 2022 06:00) (95% - 99%)    Parameters below as of 20 Dec 2022 06:00  Patient On (Oxygen Delivery Method): room air      Daily     Daily Weight in k.3 (19 Dec 2022 09:54)    GENERAL:  No acute distress  HEENT:  NCAT, no scleral icterus  CHEST: no resp distress  HEART:  RRR  ABDOMEN:  Soft, non-tender, non-distended, normoactive bowel sounds, no masses  EXTREMITIES:  No cyanosis, clubbing, or edema  SKIN:  No rash/erythema/ecchymoses/petechiae/wounds/abscess/warm/dry  NEURO:  Alert and oriented x 3, no asterixis, no tremor    LABS:                        10.0   16.30 )-----------( 124      ( 20 Dec 2022 01:30 )             29.0     Mean Cell Volume: 79.9 fL (- @ 01:30)    12    131<L>  |  95<L>  |  55<H>  ----------------------------<  93  4.4   |  17<L>  |  4.70<H>    Ca    8.8      20 Dec 2022 01:30  Phos  6.5       Mg     2.30         TPro  5.3<L>  /  Alb  2.1<L>  /  TBili  34.4<H>  /  DBili  x   /  AST  1728<H>  /  ALT  423<H>  /  AlkPhos  405<H>      LIVER FUNCTIONS - ( 20 Dec 2022 01:30 )  Alb: 2.1 g/dL / Pro: 5.3 g/dL / ALK PHOS: 405 U/L / ALT: 423 U/L / AST: 1728 U/L / GGT: x           PT/INR - ( 20 Dec 2022 01:30 )   PT: 19.5 sec;   INR: 1.67 ratio         PTT - ( 20 Dec 2022 01:30 )  PTT:44.4 sec  Urinalysis Basic - ( 18 Dec 2022 10:39 )    Color: Katina / Appearance: Turbid / S.021 / pH: x  Gluc: x / Ketone: Trace  / Bili: Large / Urobili: <2 mg/dL   Blood: x / Protein: 100 mg/dL / Nitrite: Negative   Leuk Esterase: Negative / RBC: 10 /HPF / WBC 2 /HPF   Sq Epi: x / Non Sq Epi: 1 /HPF / Bacteria: Few            Imaging:

## 2022-12-20 NOTE — GOALS OF CARE CONVERSATION - ADVANCED CARE PLANNING - CONVERSATION DETAILS
Discussed with America (daughter) over the phone, continuing discussion with extended family of the night prior and morning update, After comprehensive discussion with specialists, patient would need new biopsy prior to any adequate prognostication or possible cancer treatment could be considered. however given worsening clinical status, increased pressors, worsening liver function, continued kidney dysfunction, increased lactate and troponin, stability not adequate to obtain said biopsy. With regards to what can be offered at this moment, more imaging and interventions would likely cause more harm than benefit both in short term and long term and require a prohibitive escalation in support. Daughter expresses understanding. Attending answered all questions. Although consideration for formal full comfort care, DNR/DNI was made will hold off until daughter has opportunity to discuss with her own sister. At this time, will continue current abx and pressor therapy within reason however will ensure that patient is under no distress and treat accordingly. Discussed with America (daughter) over the phone, continuing discussion with extended family of the night prior and morning update, After comprehensive discussion with specialists, patient would need new biopsy prior to any adequate prognostication or possible cancer treatment could be considered. However given worsening clinical status, increased pressors, worsening liver function, continued kidney dysfunction, increased lactate and troponin, stability not adequate to obtain said biopsy. With regards to what can be offered at this moment in consideration that patient has had progression of disease despite multiple lines and modalities of cancer treatment, more imaging and interventions would likely cause more harm than benefit both in short term and long term and require a prohibitive escalation in support. Daughter expresses understanding. Attending answered all questions. Although consideration for formal full comfort care, DNR/DNI was made will hold off until daughter has opportunity to discuss with her own sister. At this time, will continue current abx and pressor therapy within reason however will ensure that patient is under no distress and treat accordingly.

## 2022-12-20 NOTE — PROGRESS NOTE ADULT - CRITICAL CARE ATTENDING COMMENT
Pt seen and examined. 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 Remains on pressor support, titrate to keep MAP >65, follow lactate. Antibiotics broadened to Meropenem, check Vanc level and dose by level. Progressive leukocytosis, BCxs remain NGTD, UCx with 50-99 K E coli. Serum cortisol only 14 in the setting of shock, start stress dose steroids. Tolerated a limited duration of dialysis yesterday, no hydronephrosis on renal US, FUP electrolytes and UO. Troponin level trending up, no wall motion abnormality on TTE, likely demand ischemia. Not a candidate for AC d/t ongoing coagulopathy. Progressive transaminitis and hyperbilirubinemia, awaiting IR input regarding possible decompression. [facility] discussed with her daughter America, who understands that the patient remains critically ill with multiorgan failure and is unlikely to survive this hospitalization. She is agreeable to ensuring that the patient is pain free and limiting aggressive interventions. She will discuss code status with her sister and get back to us.

## 2022-12-21 NOTE — PROGRESS NOTE ADULT - CRITICAL CARE ATTENDING COMMENT
Pt seen and examined. 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 Remains on pressor support, titrate to keep MAP >65, progressive lactic acidosis, cont to follow lactate level. Remains on ABx coverage with Meropenem and Vanc (by level). Progressive leukocytosis, BCxs remain NGTD, UCx with 50-99 K E coli. Cont stress dose steroids. Progressive renal failure and now with hyperkalemia, plan for dialysis today, no hydronephrosis on renal US, cont to follow UO. Troponin level trending up, no wall motion abnormality on TTE, likely demand ischemia. Not a candidate for AC d/t ongoing coagulopathy. Progressive transaminitis and hyperbilirubinemia, ? IR decompression. C discussed with her daughter America, who understands that the patient remains critically ill with multiorgan failure and is unlikely to survive this hospitalization. She is agreeable to ensuring that the patient is pain free and limiting aggressive interventions. She will discuss code status with her sister and get back to us. Pt seen and examined. 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 Remains on pressor support, titrate to keep MAP >65, progressive lactic acidosis, cont to follow lactate level. Remains on ABx coverage with Meropenem and Vanc (by level). Progressive leukocytosis, BCxs remain NGTD, UCx with 50-99 K E coli. Cont stress dose steroids. Progressive renal failure and now with hyperkalemia, plan for dialysis today, no hydronephrosis on renal US, cont to follow UO. Troponin level trending up, no wall motion abnormality on TTE, likely demand ischemia. Not a candidate for AC d/t ongoing coagulopathy. Progressive transaminitis and hyperbilirubinemia, ongoing discussions regarding possibility of IR decompression. GOC discussed with her daughter America as well as numerous other family members including Aline and Shelbi. We reiterated that the patient remains critically ill with multiorgan failure and is unlikely to survive this hospitalization. Additionally we explained that current aggressive interventions are unlikely to change her disease trajectory and CPR will stanley be medically futile. Family is grappling with this information and report that they were not aware of her metastatic uterine cancer ( confirmed on records from her outpt oncologist at [facility]). Will cont GOC discussions with the family.

## 2022-12-21 NOTE — PROGRESS NOTE ADULT - SUBJECTIVE AND OBJECTIVE BOX
CHIEF COMPLAINT:    Interval Events:    HPI:  Ms. Mckinney is a 67 F PMH uterine ca (s/p hysterectomy, chemo/RT, in remission as of 4 months ago), BIBEMS to [place], p/w 2 weeks painless jaundice and progressive failure to thrive, 1 day epistaxis and vaginal bleeding; found to have likely metastatic cholangiocarcinoma and acute renal failure (K 5.8, Cr 7.29); transferred to [facility] ED for further management.     Patient was at usual state of health performing IADLs until 2 weeks prior to admission when she developed scleral icterus, pruritus, intolerance of solid food, and reduced PO intake. She endorsed a feeling of prolonged fullness with meals that would eventually on occasion prompt her to induce vomiting. Last had broth on 12/14 lunch 1 day prior to admission; had tea this morning. Also endorsed HERNANDEZ, "puffy" abdomen, light colored stools (at bedside), +dark urine. No fevers, chills, confusion, tremors, abdominal pain, sick contacts, bloody vomit/bloody stool, petechial rash/bruising/other bleeding.    Notably K 5.9 at OSH/ED, received calcium gluconate and Lokelma.    (15 Dec 2022 17:50)      REVIEW OF SYSTEMS:  Constitutional: [ ] negative [ ] fevers [ ] chills [ ] weight loss [ ] weight gain  HEENT: [ ] negative [ ] dry eyes [ ] eye irritation [ ] postnasal drip [ ] nasal congestion  CV: [ ] negative  [ ] chest pain [ ] orthopnea [ ] palpitations [ ] murmur  Resp: [ ] negative [ ] cough [ ] shortness of breath [ ] dyspnea [ ] wheezing [ ] sputum [ ] hemoptysis  GI: [ ] negative [ ] nausea [ ] vomiting [ ] diarrhea [ ] constipation [ ] abd pain [ ] dysphagia   : [ ] negative [ ] dysuria [ ] nocturia [ ] hematuria [ ] increased urinary frequency  Musculoskeletal: [ ] negative [ ] back pain [ ] myalgias [ ] arthralgias [ ] fracture  Skin: [ ] negative [ ] rash [ ] itch  Neurological: [ ] negative [ ] headache [ ] dizziness [ ] syncope [ ] weakness [ ] numbness  Psychiatric: [ ] negative [ ] anxiety [ ] depression  Endocrine: [ ] negative [ ] diabetes [ ] thyroid problem  Hematologic/Lymphatic: [ ] negative [ ] anemia [ ] bleeding problem  Allergic/Immunologic: [ ] negative [ ] itchy eyes [ ] nasal discharge [ ] hives [ ] angioedema  [ ] All other systems negative  [ ] Unable to assess ROS because ________    OBJECTIVE:  ICU Vital Signs Last 24 Hrs  T(C): 36.9 (21 Dec 2022 04:00), Max: 37 (21 Dec 2022 00:00)  T(F): 98.5 (21 Dec 2022 04:00), Max: 98.6 (21 Dec 2022 00:00)  HR: 87 (21 Dec 2022 05:00) (82 - 93)  BP: 104/61 (21 Dec 2022 05:00) (89/50 - 116/70)  BP(mean): 73 (21 Dec 2022 05:00) (58 - 86)  ABP: --  ABP(mean): --  RR: 34 (21 Dec 2022 05:00) (17 - 36)  SpO2: 96% (21 Dec 2022 05:00) (91% - 97%)    O2 Parameters below as of 21 Dec 2022 05:00  Patient On (Oxygen Delivery Method): nasal cannula  O2 Flow (L/min): 2            12-19 @ 07:01  -  12-20 @ 07:00  --------------------------------------------------------  IN: 1529.6 mL / OUT: 452 mL / NET: 1077.6 mL    12-20 @ 07:01  -  12-21 @ 06:58  --------------------------------------------------------  IN: 1025 mL / OUT: 270 mL / NET: 755 mL      CAPILLARY BLOOD GLUCOSE      POCT Blood Glucose.: 86 mg/dL (21 Dec 2022 06:42)      Physical Exam:                 HOSPITAL MEDICATIONS:  Standing Meds:  calcium gluconate IVPB 2 Gram(s) IV Intermittent once  chlorhexidine 2% Cloths 1 Application(s) Topical <User Schedule>  dextrose 50% Injectable 25 Gram(s) IV Push once  dextrose 50% Injectable 12.5 Gram(s) IV Push once  dextrose 50% Injectable 25 Gram(s) IV Push once  dextrose 50% Injectable 25 milliLiter(s) IV Push once  dextrose Oral Gel 15 Gram(s) Oral once  glucagon  Injectable 1 milliGRAM(s) IntraMuscular once  hydrocortisone sodium succinate Injectable 50 milliGRAM(s) IV Push every 6 hours  insulin regular  human recombinant 5 Unit(s) IV Push once  meropenem  IVPB 500 milliGRAM(s) IV Intermittent every 24 hours  midodrine 30 milliGRAM(s) Oral every 8 hours  norepinephrine Infusion 0.05 MICROgram(s)/kG/Min IV Continuous <Continuous>      PRN Meds:      LABS:                        9.9    20.75 )-----------( 125      ( 21 Dec 2022 04:19 )             29.7     Hgb Trend: 9.9<--, 10.0<--, 9.7<--, 9.4<--, 8.7<--  12-21    132<L>  |  95<L>  |  71<H>  ----------------------------<  94  5.4<H>   |  13<L>  |  5.69<H>    Ca    8.7      21 Dec 2022 04:19  Phos  8.4     12-21  Mg     2.50     12-21    TPro  5.5<L>  /  Alb  2.1<L>  /  TBili  34.9<H>  /  DBili  x   /  AST  >7000<H>  /  ALT  1946<H>  /  AlkPhos  797<H>  12-21    Creatinine Trend: 5.69<--, 4.70<--, 5.54<--, 5.25<--, 5.19<--, 4.51<--  PT/INR - ( 20 Dec 2022 01:30 )   PT: 19.5 sec;   INR: 1.67 ratio         PTT - ( 21 Dec 2022 04:19 )  PTT:51.3 sec      Venous Blood Gas:  12-20 @ 12:45  7.29/37/46/18/69.5  VBG Lactate: 6.4  Venous Blood Gas:  12-20 @ 01:30  7.36/36/56/20/85.6  VBG Lactate: --             CHIEF COMPLAINT:    Interval Events: continues to be progressively lethargic although intermittently reports abdominal pain. had one bowel movement, overnight obtained temporization for hyperkalemia    HPI:  Ms. Mckinney is a 67 F PMH uterine ca (s/p hysterectomy, chemo/RT, in remission as of 4 months ago), BIBEMS to [place], p/w 2 weeks painless jaundice and progressive failure to thrive, 1 day epistaxis and vaginal bleeding; 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     Patient was at usual state of health performing IADLs until 2 weeks prior to admission when she developed scleral icterus, pruritus, intolerance of solid food, and reduced PO intake. She endorsed a feeling of prolonged fullness with meals that would eventually on occasion prompt her to induce vomiting. Last had broth on 12/14 lunch 1 day prior to admission; had tea this morning. Also endorsed HERNANDEZ, "puffy" abdomen, light colored stools (at bedside), +dark urine. No fevers, chills, confusion, tremors, abdominal pain, sick contacts, bloody vomit/bloody stool, petechial rash/bruising/other bleeding.    Notably K 5.9 at OSH/ED, received calcium gluconate and Lokelma.    (15 Dec 2022 17:50)      REVIEW OF SYSTEMS:  unable to assess due to profound lethargy    OBJECTIVE:  ICU Vital Signs Last 24 Hrs  T(C): 36.9 (21 Dec 2022 04:00), Max: 37 (21 Dec 2022 00:00)  T(F): 98.5 (21 Dec 2022 04:00), Max: 98.6 (21 Dec 2022 00:00)  HR: 87 (21 Dec 2022 05:00) (82 - 93)  BP: 104/61 (21 Dec 2022 05:00) (89/50 - 116/70)  BP(mean): 73 (21 Dec 2022 05:00) (58 - 86)  ABP: --  ABP(mean): --  RR: 34 (21 Dec 2022 05:00) (17 - 36)  SpO2: 96% (21 Dec 2022 05:00) (91% - 97%)    O2 Parameters below as of 21 Dec 2022 05:00  Patient On (Oxygen Delivery Method): nasal cannula  O2 Flow (L/min): 2            12-19 @ 07:01 - 12-20 @ 07:00  --------------------------------------------------------  IN: 1529.6 mL / OUT: 452 mL / NET: 1077.6 mL    12-20 @ 07:01 - 12-21 @ 06:58  --------------------------------------------------------  IN: 1025 mL / OUT: 270 mL / NET: 755 mL      CAPILLARY BLOOD GLUCOSE      POCT Blood Glucose.: 86 mg/dL (21 Dec 2022 06:42)      Physical Exam:   HEENT: NC/AT; PERRL, sclera icterus  Neck: supple  Respiratory: CTA b/l  Cardiovascular: +S1/S2; RRR  Abdomen: soft, NT/ND; +BS x4  Extremities: WWP, 2+ peripheral pulses b/l; mild anasarca  Skin: normal color and turgor; jaundice  Neurological: ao2-3, lethargic  RUE with solid swelling proximal, distal with previous extravasation              HOSPITAL MEDICATIONS:  Standing Meds:  calcium gluconate IVPB 2 Gram(s) IV Intermittent once  chlorhexidine 2% Cloths 1 Application(s) Topical <User Schedule>  dextrose 50% Injectable 25 Gram(s) IV Push once  dextrose 50% Injectable 12.5 Gram(s) IV Push once  dextrose 50% Injectable 25 Gram(s) IV Push once  dextrose 50% Injectable 25 milliLiter(s) IV Push once  dextrose Oral Gel 15 Gram(s) Oral once  glucagon  Injectable 1 milliGRAM(s) IntraMuscular once  hydrocortisone sodium succinate Injectable 50 milliGRAM(s) IV Push every 6 hours  insulin regular  human recombinant 5 Unit(s) IV Push once  meropenem  IVPB 500 milliGRAM(s) IV Intermittent every 24 hours  midodrine 30 milliGRAM(s) Oral every 8 hours  norepinephrine Infusion 0.05 MICROgram(s)/kG/Min IV Continuous <Continuous>      PRN Meds:      LABS:                        9.9    20.75 )-----------( 125      ( 21 Dec 2022 04:19 )             29.7     Hgb Trend: 9.9<--, 10.0<--, 9.7<--, 9.4<--, 8.7<--  12-21    132<L>  |  95<L>  |  71<H>  ----------------------------<  94  5.4<H>   |  13<L>  |  5.69<H>    Ca    8.7      21 Dec 2022 04:19  Phos  8.4     12-21  Mg     2.50     12-21    TPro  5.5<L>  /  Alb  2.1<L>  /  TBili  34.9<H>  /  DBili  x   /  AST  >7000<H>  /  ALT  1946<H>  /  AlkPhos  797<H>  12-21    Creatinine Trend: 5.69<--, 4.70<--, 5.54<--, 5.25<--, 5.19<--, 4.51<--  PT/INR - ( 20 Dec 2022 01:30 )   PT: 19.5 sec;   INR: 1.67 ratio         PTT - ( 21 Dec 2022 04:19 )  PTT:51.3 sec      Venous Blood Gas:  12-20 @ 12:45  7.29/37/46/18/69.5  VBG Lactate: 6.4  Venous Blood Gas:  12-20 @ 01:30  7.36/36/56/20/85.6  VBG Lactate: --

## 2022-12-21 NOTE — PROGRESS NOTE ADULT - SUBJECTIVE AND OBJECTIVE BOX
Indication of Geriatrics and Palliative Medicine Services: [facility]     DNR on chart: No     INTERVAL EVENTS: Patient seen this AM in CTICU, lethargic. HD nurse reports patient had pain, seems better after dilaudid. Patient's ellis Mireles at bedside. See below for GOC.     -------------------------------------------------------------------------------------------------------    PRESENT SYMPTOMS:     [ ] No     [ ] Yes     [X ] Unable to self report        [ ] CPOT (ICU)        [ ] PAINADS       [ ] RDOS     PAIN:   If blank, patient unable to specify   Pain:  [ ]yes [ ]no  Location -                    Aggravating factors -  Quality -  Radiation -  Timing-  Pain at most severe level (0-10 scale):  Pain at minimal acceptable level (0-10 scale):     Dyspnea:                           [ ]Mild [ ]Moderate [ ]Severe  Anxiety:                             [ ]Mild [ ]Moderate [ ]Severe  Fatigue:                             [ ]Mild [ ]Moderate [ ]Severe  Nausea:                             [ ]Mild [ ]Moderate [ ]Severe  Loss of appetite:              [ ]Mild [ ]Moderate [ ]Severe  Constipation:                    [ ]Mild [ ]Moderate [ ]Severe    Other Symptoms:  [ ]All other review of systems negative     Home Medications for Symptoms if present:    I Stop Reference no:     -------------------------------------------------------------------------------------------------------    ITEMS UNCHECKED ARE NOT PRESENT    PHYSICAL:  Vital Signs Last 24 Hrs  T(C): 36.8 (21 Dec 2022 08:15), Max: 37 (21 Dec 2022 00:00)  T(F): 98.3 (21 Dec 2022 08:15), Max: 98.6 (21 Dec 2022 00:00)  HR: 79 (21 Dec 2022 10:00) (79 - 94)  BP: 97/65 (21 Dec 2022 10:00) (89/50 - 115/60)  BP(mean): 74 (21 Dec 2022 10:00) (58 - 82)  RR: 26 (21 Dec 2022 10:00) (17 - 36)  SpO2: 93% (21 Dec 2022 10:00) (91% - 96%)    Parameters below as of 21 Dec 2022 10:00  Patient On (Oxygen Delivery Method): nasal cannula w/ humidification  O2 Flow (L/min): 2      GENERAL:  [ ] Cachexia [ ] Frail [ ]Awake [ ]Oriented x   [ ]Lethargic  [ ]Cachexia  [ ]Unarousable  [ ]Verbal  [ ]Non-Verbal    Behavioral:   [ ]Anxiety  [ ]Delirium [ ]Agitation [ ]Other    HEENT:  [ ]Normal   [ ]Dry mouth   [ ]ET Tube/Trach  [ ]Oral lesions    PULMONARY:   [ ]Clear [ ]Tachypnea  [ ]Audible excessive secretions   [ ]Rhonchi        [ ]Right [ ]Left [ ]Bilateral  [ ]Crackles        [ ]Right [ ]Left [ ]Bilateral  [ ]Wheezing     [ ]Right [ ]Left [ ]Bilateral  [ ]Diminished BS [ ] Right [ ]Left [ ]Bilateral    CARDIOVASCULAR:    [ ]Regular [ ]Irregular [ ]Tachy  [ ]Manuel [ ]Murmur [ ]Other    GASTROINTESTINAL:  [ ]Soft  [ ]Distended   [ ]+BS  [ ]Non tender [ ]Tender  [ ]PEG [ ]OGT/ NGT   Last BM:     GENITOURINARY:  [ ]Normal [ ]Incontinent   [ ]Oliguria/Anuria   [ ]Valle    MUSCULOSKELETAL:   [ ]Normal   [ ]Weakness  [ ]Bed/Wheelchair bound [ ]Edema    NEUROLOGIC:   [ ]No focal deficits  [ ] Cognitive impairment  [ ] Dysphagia [ ]Dysarthria [ ] Paresis [ ]Other     SKIN:   [ ]Normal  [ ]Rash   [ ]Pressure ulcer(s) [ ]y [ ]n present on admission    -------------------------------------------------------------------------------------------------------    LABS:                        9.9    20.75 )-----------( 125      ( 21 Dec 2022 04:19 )             29.7   12-21    132<L>  |  95<L>  |  71<H>  ----------------------------<  94  5.4<H>   |  13<L>  |  5.69<H>    Ca    8.7      21 Dec 2022 04:19  Phos  8.4     12-21  Mg     2.50     12-21    TPro  5.5<L>  /  Alb  2.1<L>  /  TBili  34.9<H>  /  DBili  x   /  AST  >7000<H>  /  ALT  1946<H>  /  AlkPhos  797<H>  12-21  PT/INR - ( 21 Dec 2022 08:46 )   PT: 17.7 sec;   INR: 1.52 ratio         PTT - ( 21 Dec 2022 08:46 )  PTT:47.6 sec    -------------------------------------------------------------------------------------------------------  RADIOLOGY & ADDITIONAL STUDIES:     < from: US Abdomen Limited (12.20.22 @ 10:37) >  Bile ducts: Common bile duct contains debris and measures 0.8 cm.  Gallbladder: Distended with sludge. Gallbladder wall is thickened.  Pancreas: Poorly visualized.    < end of copied text >  < from: US Kidney and Bladder (12.16.22 @ 12:40) >  IMPRESSION:  No hydronephrosis    Partially visualized hepatic findings highly suspicious for portal venous   thrombus.     Distended gallbladder containing sludge with nonspecific diffuse wall   thickening and possible gallbladder varices    < end of copied text >  < from: CT Abdomen and Pelvis No Cont (12.15.22 @ 05:11) >    IMPRESSION:  1. Limited study secondary to motion artifact and absence of contrast.  2. Abnormal appearing gallbladder, baldomero hepatis, with marked intrahepatic  ductal dilatation and multiple lesions in the liver.  3. Thickening of the distal stomach, proximal duodenum, and enlargement   of the pancreatic head and uncinate process. 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 Concern for   portal venous thrombosis and resultant hyperdense portal veins. Further   evaluation  with contrast enhanced MRI and MRCP examination is recommended.  4. Enlarged bilateral adrenal glands.  5. Small ascites.  6. Multiple other findings as described in detail above.    < end of copied text >    -------------------------------------------------------------------------------------------------------  MEDICATIONS:     MEDICATIONS  (STANDING):  chlorhexidine 2% Cloths 1 Application(s) Topical <User Schedule>  dextrose 50% Injectable 25 Gram(s) IV Push once  dextrose 50% Injectable 12.5 Gram(s) IV Push once  dextrose 50% Injectable 25 Gram(s) IV Push once  dextrose Oral Gel 15 Gram(s) Oral once  glucagon  Injectable 1 milliGRAM(s) IntraMuscular once  hydrocortisone sodium succinate Injectable 50 milliGRAM(s) IV Push every 6 hours  meropenem  IVPB 500 milliGRAM(s) IV Intermittent every 24 hours  midodrine 30 milliGRAM(s) Oral every 8 hours  norepinephrine Infusion 0.05 MICROgram(s)/kG/Min (7.16 mL/Hr) IV Continuous <Continuous>    -------------------------------------------------------------------------------------------------------    CRITICAL CARE:  [ ]Shock Present  [ ]Septic [ ]Cardiogenic [ ]Neurologic [ ]Hypovolemic  [ ]Vasopressors [ ]Inotropes  [ ]Respiratory failure present [ ]Mechanical Ventilation [ ]Non-invasive ventilatory support [ ]High-Flow   [ ]Acute  [ ]Chronic [ ]Hypoxic  [ ]Hypercarbic [ ]Other  [ ]Other organ failure     -------------------------------------------------------------------------------------------------------  REFERRALS:   [ ]Ron  [ ]Hospice  [ ]Child Life  [ ]Social Work  [ ]Case management [ ]Holistic Therapy    Indication of Geriatrics and Palliative Medicine Services: [facility]     DNR on chart: No     INTERVAL EVENTS: Patient seen this AM in CTICU, lethargic. HD nurse reports patient had pain, seems better after dilaudid. Patient's ellis Mireles at bedside. See below for GOC.     -------------------------------------------------------------------------------------------------------    PRESENT SYMPTOMS:     [ ] No     [ ] Yes     [X ] Unable to self report        [ ] CPOT (ICU)        [ ] PAINADS       [ ] RDOS     PAIN:   If blank, patient unable to specify   Pain:  [ ]yes [ ]no  Location -                    Aggravating factors -  Quality -  Radiation -  Timing-  Pain at most severe level (0-10 scale):  Pain at minimal acceptable level (0-10 scale):     Dyspnea:                           [ ]Mild [ ]Moderate [ ]Severe  Anxiety:                             [ ]Mild [ ]Moderate [ ]Severe  Fatigue:                             [ ]Mild [ ]Moderate [ ]Severe  Nausea:                             [ ]Mild [ ]Moderate [ ]Severe  Loss of appetite:              [ ]Mild [ ]Moderate [ ]Severe  Constipation:                    [ ]Mild [ ]Moderate [ ]Severe    Other Symptoms:  [ ]All other review of systems negative     Home Medications for Symptoms if present:    I Stop Reference no:     -------------------------------------------------------------------------------------------------------    ITEMS UNCHECKED ARE NOT PRESENT    PHYSICAL:  Vital Signs Last 24 Hrs  T(C): 36.8 (21 Dec 2022 08:15), Max: 37 (21 Dec 2022 00:00)  T(F): 98.3 (21 Dec 2022 08:15), Max: 98.6 (21 Dec 2022 00:00)  HR: 79 (21 Dec 2022 10:00) (79 - 94)  BP: 97/65 (21 Dec 2022 10:00) (89/50 - 115/60)  BP(mean): 74 (21 Dec 2022 10:00) (58 - 82)  RR: 26 (21 Dec 2022 10:00) (17 - 36)  SpO2: 93% (21 Dec 2022 10:00) (91% - 96%)    Parameters below as of 21 Dec 2022 10:00  Patient On (Oxygen Delivery Method): nasal cannula w/ humidification  O2 Flow (L/min): 2      GENERAL:  [ ]Cachexia  [X ] Frail  [ ]Awake  [ ]Oriented x   [X ]Lethargic  [ ]Unarousable  [ ]Verbal  [ ]Non-Verbal    BEHAVIORAL: [ ] Anxiety  [ ] Delirium [ ] Agitation [ ] Other    HEENT: [ ]Normal   [ ]Dry mouth   [ ]ET Tube/Trach  [ ]Oral lesions    PULMONARY:   [X ]Clear [ ]Tachypnea  [ ]Audible excessive secretions   [ ]Rhonchi        [ ]Right [ ]Left [ ]Bilateral  [ ]Crackles        [ ]Right [ ]Left [ ]Bilateral  [ ]Wheezing     [ ]Right [ ]Left [ ]Bilateral  [ ]Diminished breath sounds [ ]right [ ]left [ ]bilateral    CARDIOVASCULAR:    [ ]Regular [ ]Irregular [X ]Tachy  [ ]Maunel [ ]Murmur [ ]Other    GASTROINTESTINAL:  [X ]Soft  [ ]Distended   [X ]+BS  [X ]Non tender [ ]Tender  [ ]Other [ ]PEG [ ]OGT/ NGT      GENITOURINARY:  [ ]Normal [ ] Incontinent   [ ]Oliguria/Anuria   [X ]Valle    MUSCULOSKELETAL:   [ ]Normal   [X ]Weakness  [ ]Bed/Wheelchair bound [ ]Edema    NEUROLOGIC:   [X ]No focal deficits  [ ]Cognitive impairment  [ ]Dysphagia [ ]Dysarthria [ ]Paresis [ ]Other     SKIN:   [X ]Normal  [ ]Rash  [ ]Other  [ ]Pressure ulcer(s)       Present on admission [ ]y [ ]n      -------------------------------------------------------------------------------------------------------    LABS:                        9.9    20.75 )-----------( 125      ( 21 Dec 2022 04:19 )             29.7   12-21    132<L>  |  95<L>  |  71<H>  ----------------------------<  94  5.4<H>   |  13<L>  |  5.69<H>    Ca    8.7      21 Dec 2022 04:19  Phos  8.4     12-21  Mg     2.50     12-21    TPro  5.5<L>  /  Alb  2.1<L>  /  TBili  34.9<H>  /  DBili  x   /  AST  >7000<H>  /  ALT  1946<H>  /  AlkPhos  797<H>  12-21  PT/INR - ( 21 Dec 2022 08:46 )   PT: 17.7 sec;   INR: 1.52 ratio         PTT - ( 21 Dec 2022 08:46 )  PTT:47.6 sec    -------------------------------------------------------------------------------------------------------  RADIOLOGY & ADDITIONAL STUDIES:     < from: US Abdomen Limited (12.20.22 @ 10:37) >  Bile ducts: Common bile duct contains debris and measures 0.8 cm.  Gallbladder: Distended with sludge. Gallbladder wall is thickened.  Pancreas: Poorly visualized.    < end of copied text >  < from: US Kidney and Bladder (12.16.22 @ 12:40) >  IMPRESSION:  No hydronephrosis    Partially visualized hepatic findings highly suspicious for portal venous   thrombus.     Distended gallbladder containing sludge with nonspecific diffuse wall   thickening and possible gallbladder varices    < end of copied text >  < from: CT Abdomen and Pelvis No Cont (12.15.22 @ 05:11) >    IMPRESSION:  1. Limited study secondary to motion artifact and absence of contrast.  2. Abnormal appearing gallbladder, baldomero hepatis, with marked intrahepatic  ductal dilatation and multiple lesions in the liver.  3. Thickening of the distal stomach, proximal duodenum, and enlargement   of the pancreatic head and uncinate process. 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 Concern for   portal venous thrombosis and resultant hyperdense portal veins. Further   evaluation  with contrast enhanced MRI and MRCP examination is recommended.  4. Enlarged bilateral adrenal glands.  5. Small ascites.  6. Multiple other findings as described in detail above.    < end of copied text >    -------------------------------------------------------------------------------------------------------  MEDICATIONS:     MEDICATIONS  (STANDING):  chlorhexidine 2% Cloths 1 Application(s) Topical <User Schedule>  dextrose 50% Injectable 25 Gram(s) IV Push once  dextrose 50% Injectable 12.5 Gram(s) IV Push once  dextrose 50% Injectable 25 Gram(s) IV Push once  dextrose Oral Gel 15 Gram(s) Oral once  glucagon  Injectable 1 milliGRAM(s) IntraMuscular once  hydrocortisone sodium succinate Injectable 50 milliGRAM(s) IV Push every 6 hours  meropenem  IVPB 500 milliGRAM(s) IV Intermittent every 24 hours  midodrine 30 milliGRAM(s) Oral every 8 hours  norepinephrine Infusion 0.05 MICROgram(s)/kG/Min (7.16 mL/Hr) IV Continuous <Continuous>    -------------------------------------------------------------------------------------------------------    CRITICAL CARE:  [ ]Shock Present  [ ]Septic [ ]Cardiogenic [ ]Neurologic [ ]Hypovolemic  [ ]Vasopressors [ ]Inotropes  [ ]Respiratory failure present [ ]Mechanical Ventilation [ ]Non-invasive ventilatory support [ ]High-Flow   [ ]Acute  [ ]Chronic [ ]Hypoxic  [ ]Hypercarbic [ ]Other  [ ]Other organ failure     -------------------------------------------------------------------------------------------------------  REFERRALS:   [ ]Ron  [ ]Hospice  [ ]Child Life  [ ]Social Work  [ ]Case management [ ]Holistic Therapy

## 2022-12-21 NOTE — PROGRESS NOTE ADULT - ATTENDING COMMENTS
discussed with MICU NP today.  patient is critically ill with increasing pressor requirements.  planned for 2 hours HD today with 0 UF and low dialysate temp / sodium modelling.  If patient unable to tolerated HD will need to change to CRRT.  prognosis poor.  no signs of meaningful recovery yet.

## 2022-12-21 NOTE — PROGRESS NOTE ADULT - CONVERSATION DETAILS
Spoke with patient's niece Aline (a ) at bedside and patient's daughter America on the phone. They report that they did know not patient's disease had progressed. They state that her oncologist suggested palliative care, but also that she should have CT of her abdomen. Family have medical questions, and say they do not have access to her diagnostic testing, requesting to see her labs. Mentioned worsening LFTs, showing worsening liver failure which was irreversible. Daughter says that patient herself said she "wants to live" and daughter has to honor those wishes. Explained that whatever patient wished her before, the reality is patient is very weak and has limited time and the question was now was how she should want to die- either naturally and peacefully with end of life symptom management or via artificial machines. Discussed poor outcomes if they choose the latter. America makes decisions with her whole family so they must all speak to each other.

## 2022-12-21 NOTE — PROGRESS NOTE ADULT - PROBLEM SELECTOR PLAN 1
Pt with severe DEMARCO in the setting of hypotension, decreased PO intake, and hyperbilirubinemia. Pt denies any prior known history of kidney disease. [facility] was initially 7.29 on 12/15/22. UA shows hematuria, and proteinuria. Bilirubin level significantly elevated at 32.7. No evidence of obstruction on CT abdomen. 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 Pt received first session of HD session on 12/15/22 for hyperkalemia and worsening metabolic acidosis. Spot urine TP/CR is elevated at 1.1. Renal US ruled out hydronephrosis. Labs reviewed. Last HD was 12/19/22. Pt on increasing IV vasopressors. Will plan for HD today. If unable to tolerate then the next step would be for CRRT given her hemodynamic instability. Monitor labs and urine output. Dose medications as per eGFR.

## 2022-12-21 NOTE — PROGRESS NOTE ADULT - PROBLEM SELECTOR PLAN 1
- 2/2 biliary obstruction from cancer   - LFTs worsened   - ERCP deferred for now   - pending MRCP   - IR for biliary decompression, but needs CT prior, unable to be moved - 2/2 biliary obstruction from cancer   - LFTs worsened   - ERCP deferred for now   - pending MRCP   - IR for biliary decompression, but needs CT beforehand, unable to be moved

## 2022-12-21 NOTE — PROGRESS NOTE ADULT - PROBLEM SELECTOR PROBLEM 3
Metabolic acidosis
Uterine cancer
Metabolic acidosis

## 2022-12-21 NOTE — PROGRESS NOTE ADULT - PROBLEM SELECTOR PLAN 5
- Palliative consulted for GOC  - Full code - See [facility] note above. Discussed poor prognosis with daughter America and niece Aline. Discussed patient is approaching end of life. Family is having difficultly accepting this, stress patient "wanted to live."    - Family has mistrust of medical teams after their experience with patient's oncologist   - Daughter makes decisions with her sister and extended family

## 2022-12-21 NOTE — PROGRESS NOTE ADULT - PROBLEM SELECTOR PLAN 2
Pt with hyperkalemia in the setting of DEMARCO.  Serum potassium was initially 5.8. Pt received medical management. Pt initiated on HD 12/15/22; last HD session on 12/19/22. Serum potassium WNL at 4.4 today. Monitor serum potassium.
Pt with hyperkalemia in the setting of DEMARCO.  Serum potassium was initially 5.8. Pt received medical management. Pt initiated on HD 12/15/22; last HD session on 12/19/22. Serum potassium elevated at 5.4 today. Plan for HD as mentioned above. Monitor serum potassium.
Pt with hyperkalemia in the setting of DEMARCO.  Serum potassium was initially 5.8. Pt received medical management. Pt initiated on HD 12/15/22; with most recent HD session on 12/19/22. Monitor serum potassium.
Pt with hyperkalemia in the setting of DEMARCO.  Serum potassium was initially 5.8. Pt received IV insulin/D50, Lokelma, and calcium gluconate. Pt received HD session on 12/15/22 and then on 12/17/22. Serum potassium is WNL (4.4) today. Monitor serum potassium
Pt with hyperkalemia in the setting of DEMARCO.  Serum potassium was initially 5.8. Pt received IV insulin/D50, Lokelma, and calcium gluconate. Pt received HD session on 12/15/22. Serum potassium is WNL (4.4) today. Monitor serum potassium.
- CT showing abnormal gallbladder and metastatic lesions in abdomen  - no tissue biopsy, but imaging suggesting of metastatic cholangio.

## 2022-12-21 NOTE — PROGRESS NOTE ADULT - ASSESSMENT
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 Plan for eventual MRCP per GI.     #Neuro  A&Ox3  - Pt is now very lethargic i/s/o renal failure vs probable cholangiocarcinoma with metastasis   - elevated BUN in the setting of renal failure  - ammonia 47 this AM, trend ammonia in AM, will initiate lactulose if necessary  - on meropenem for possible infection.  - ABG without hypercapnia  - although neuro exam not indicative of local stroke and w/o hx of brain mets, in the setting of increased coagulopathy will need to consider CTH if neuro exam changes, had outpatient MRI 10/4/2021 w/o evidence of intracranial metases, had some chronic microvascular diseases  for pain control only tolerating decreased levels of dilaudid will continue intermittent 0.25 dilaudid IVP as necessary      #PULMONARY   CT with consolidation in lingula and RML - atelectasis vs. pneumonia  CXR with right base atelectasis vs infiltrate   - Saturating well on room air  - continue to monitor with worsening mental; status       #CV  - patient with worsening oncotic pressure and vasoplegia secondary to worsening liver function in the setting of possible septic shock  - restarted midodrine 30 q8  - titrate levophed to sustain MAP>65  - patient also with cardiac dysfunction with regards to troponinemia with ABBE not a candidate for apt, AC, cath. should keep Hgb> 10 due to active ischemia  - repeat formal echo: grossly normal LV SF, RVSF, no WMA or diastolic dysfxn  - stress dose steroids 50 q6 started 12/20: has mid day cortisol level indeterminate range, increasing pressors, borderline low sugars,       #GI  Malignant obstruction; Alk phos 600 on admission  - CT 12/15 c/f cholangiocarcinoma metastatic to liver, gallbladder abnormalities, pancreatic enlargement. Per outpatient oncologist, had progressively worsening presumed uterine cancer. Outpatient, patient was evaled by oncologist with worsening abdominal mass and was recommended for CT. Received CT at hospital revealing new cholangiocarcinoma. Was planning hospice and chemo was stopped as uterine cancer was refractory to treatment  - Surgery on board  - Gi recommending MR MRCP pending vs  IR for more urgent biliary decompression (+/- biopsy) however likely too unstable will discuss with family and IR   - IR notes no indication for tube at this time  - Ca 199 not elevated, CEA not elevated,  elevated at 1031  - Pain management as needed   - CTM liver function  - NPO   - obtain RUQ ultrasound    #/RENAL  No history of kidney disease, admission SCr 7.29 in Acute Renal Failure with hyperkalemia, AGMA  - possible ATN vs bile cast nephropathy vs pre-renal etiology.   - Urgent HD 12/15, plan for urgent HD 12/16 as well   - Urine protein, urine creatinine, and urine sodium wnl   - renal US without hydro  - Strict I&O's   - will adjust shiley 12/20      #INFECTIOUS DISEASE   - Afebrile, no leukocytosis, RVP negative however consolidation noted in lingula/RML atelectasis vs. pneumonia  - Monitor for cholangitis   - MRSA swab negative, blood cultures NGTD, U culture with <100K pansensitive Ecoli   - continue empiric zosyn for positive UA and possible cholangitis   - Adjust antibiotics per HD  - escalated to meropenem for increasing WBC, worsening pressors, will check vanc level and dose according      #Endocrine   Enlarged adrenal glands  - cortisol remains at 14  - thyroid studies to be added on  - stress dose steroids 50 q6 started 12/20: has mid day cortisol level indeterminate range, increasing pressors, borderline low sugars,     #Hematology/ppx   Noted to have vaginal bleeding, recent epistaxis in setting of uremia. Improving s/p dialysis  INR elevated with mild bleeding from site, Monitor coags, improved after vit K  maintain active type and screen, keep Hgb >10 given active ischemia  possible PVT on renal US, will need to follow up  Onc to weigh in on second opinion.    #DVT AND GI PROPHYLAXIS  Hold chemo PPX iso uremic platelet dysfunction  SCDs    #GOC:   -Full code. Daughter is NOK per patient   -Onc consulted for second opinion, gave records. No further treatment from onc. If pt improves, can go to [facility].    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     #Neuro  A&Ox3  - Pt is now very lethargic i/s/o uremia in setting of renal failure  - ammonia not elevated, will continue to trend in the AM  - on meropenem/ vanc by level for possible infection.  - ABG without hypercapnia, serial VBGs unchanged  - although neuro exam not indicative of local stroke and w/o hx of brain mets, in the setting of increased coagulopathy will need to consider CTH if neuro exam changes, had outpatient MRI 10/4/2021 w/o evidence of intracranial metases, had some chronic microvascular diseases  for pain control only tolerating decreased levels of dilaudid will continue intermittent 0.25 dilaudid IVP as necessary      #PULMONARY   CT with consolidation in lingula and RML - atelectasis vs. pneumonia  CXR with right base atelectasis vs infiltrate   - Saturating well on room air, may have some component of central apnea vs [place] as patient becomes mildly hypoxemic when oversedated  - continue to monitor with worsening mental; status       #CV  - patient with worsening oncotic pressure and vasoplegia secondary to worsening liver function in the setting of possible septic shock  - midodrine 30 q8  - titrate levophed to sustain MAP>65  - patient also with cardiac dysfunction with regards to troponinemia with ABBE not a candidate for apt, AC, cath. should keep Hgb> 10 due to active ischemia  - repeat formal echo: grossly normal LV SF, RVSF, no WMA or diastolic dysfxn  - stress dose steroids 50 q6 started 12/20: has mid day cortisol level indeterminate range, increasing pressors, borderline low sugars,       #GI  Malignant obstruction; Alk phos 600 on admission  - CT 12/15 c/f cholangiocarcinoma metastatic to liver, gallbladder abnormalities, adrenal enlargement , pancreatic enlargement. 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 Outpatient, patient was evaled by oncologist with worsening abdominal mass and was recommended for CT. Received CT at hospital revealing new cholangiocarcinoma. Was planning hospice and chemo was stopped as uterine cancer was refractory to treatment  - Surgery with no plans on interventions   - GI recommending MR MRCP pending vs  IR for more urgent biliary decompression (+/- biopsy) however likely too unstable will discuss with family and IR: would require intubation and improvement in stability  - Ca 199 not elevated, CEA not elevated,  elevated at 1031  - Pain management as needed   - continues to be going into active liver failure MELD NA: 38, low plt, elevated INR  - NPO   - RUQ ultrasound: Bile ducts: Common bile duct contains debris and measures 0.8 cm.  Distended gallbladder with sludge and thick wall    #/RENAL  No history of kidney disease, admission SCr 7.29 in Acute Renal Failure with hyperkalemia, AGMA  - possible ATN vs bile cast nephropathy vs pre-renal etiology.   - Urine protein, urine creatinine, and urine sodium wnl   - renal US without hydro  - Strict I&O's   - no plans on CRRT although will attempt pressor assisted HD 12/21      #INFECTIOUS DISEASE   - Afebrile, no leukocytosis, RVP negative however consolidation noted in lingula/RML atelectasis vs. pneumonia  - Monitor for cholangitis   - MRSA swab negative, blood cultures NGTD, U culture with <100K pansensitive Ecoli   - Adjust antibiotics per HD  - initially zosyn, escalated to meropenem for increasing WBC, worsening pressors,   - added vanc by level    #Endocrine   Enlarged adrenal glands  - cortisol remains at 14  - thyroid studies TSH T4 normal T3 low  - stress dose steroids 50 q6 started 12/20: has mid day cortisol level indeterminate range, increasing pressors, borderline low sugars,   - may need to initiate D10     #Hematology/ppx   Noted to have vaginal bleeding, recent epistaxis in setting of uremia. Improving s/p dialysis  INR elevated with mild bleeding from site, Monitor coags, improved after vit K  maintain active type and screen, keep Hgb >10 given active ischemia  possible PVT on renal US, will need to follow up  Onc to weigh in on second opinion.    #DVT AND GI PROPHYLAXIS  Hold chemo PPX iso uremic platelet dysfunction, and autocoagulopathy  SCDs    #GOC:   -Full code. Daughter is NOK per patient   -Onc consulted for second opinion, gave records. No further treatment from onc. If pt improves, can go to [facility].

## 2022-12-21 NOTE — PROGRESS NOTE ADULT - ATTENDING COMMENTS
67F Hx uterine ca (s/p hysterectomy, chemo/RT, in remission as of 4 months ago), BIBEMS to [place], p/w 2 weeks painless jaundice and progressive failure to thrive, 1 day epistaxis and vaginal bleeding; found to have likely metastatic cholangiocarcinoma and acute renal failure (K 5.8, Cr 7.29), transferred to [facility] for further management, currently in MICU.    Currently in shock on levo, being followed by IR for possible percutaneous cholecystotomy tube drainage given lack of stability, however ongoing GOC with family, considering comfort measures

## 2022-12-21 NOTE — PROGRESS NOTE ADULT - SUBJECTIVE AND OBJECTIVE BOX
Gastroenterology/Hepatology Progress Note      Interval Events:   - patient deemed unstable for CT imaging, and discussion of IR and MICU team regarding role of percutaneous cholecystostomy tube, however given ongoing GOC, no immediate planned procedure  - continues on levo, NC    Allergies:  No Known Allergies      Hospital Medications:  chlorhexidine 2% Cloths 1 Application(s) Topical <User Schedule>  dextrose 50% Injectable 25 Gram(s) IV Push once  dextrose 50% Injectable 12.5 Gram(s) IV Push once  dextrose 50% Injectable 25 Gram(s) IV Push once  dextrose Oral Gel 15 Gram(s) Oral once  glucagon  Injectable 1 milliGRAM(s) IntraMuscular once  hydrocortisone sodium succinate Injectable 50 milliGRAM(s) IV Push every 6 hours  meropenem  IVPB 500 milliGRAM(s) IV Intermittent every 24 hours  midodrine 30 milliGRAM(s) Oral every 8 hours  norepinephrine Infusion 0.05 MICROgram(s)/kG/Min IV Continuous <Continuous>      ROS: 14 point ROS negative unless otherwise state in subjective    PHYSICAL EXAM:   Vital Signs:  Vital Signs Last 24 Hrs  T(C): 36.9 (21 Dec 2022 04:00), Max: 37 (21 Dec 2022 00:00)  T(F): 98.5 (21 Dec 2022 04:00), Max: 98.6 (21 Dec 2022 00:00)  HR: 87 (21 Dec 2022 05:00) (82 - 93)  BP: 104/61 (21 Dec 2022 05:00) (89/50 - 116/70)  BP(mean): 73 (21 Dec 2022 05:00) (58 - 86)  RR: 34 (21 Dec 2022 05:00) (17 - 36)  SpO2: 96% (21 Dec 2022 05:00) (91% - 97%)    Parameters below as of 21 Dec 2022 05:00  Patient On (Oxygen Delivery Method): nasal cannula  O2 Flow (L/min): 2    Daily     Daily     GENERAL:  No acute distress  HEENT:  NCAT, no scleral icterus  CHEST: no resp distress on NC  HEART:  RRR  ABDOMEN:  Soft, non-tender, non-distended, normoactive bowel sounds, no masses  EXTREMITIES:  No cyanosis, clubbing, or edema  SKIN:  No rash/erythema/ecchymoses/petechiae/wounds/abscess/warm/dry  NEURO:  Alert and oriented x 3, no asterixis, no tremor    LABS:                        9.9    20.75 )-----------( 125      ( 21 Dec 2022 04:19 )             29.7     Mean Cell Volume: 83.9 fL (12-21-22 @ 04:19)    12-21    132<L>  |  95<L>  |  71<H>  ----------------------------<  94  5.4<H>   |  13<L>  |  5.69<H>    Ca    8.7      21 Dec 2022 04:19  Phos  8.4     12-21  Mg     2.50     12-21    TPro  5.5<L>  /  Alb  2.1<L>  /  TBili  34.9<H>  /  DBili  x   /  AST  >7000<H>  /  ALT  1946<H>  /  AlkPhos  797<H>  12-21    LIVER FUNCTIONS - ( 21 Dec 2022 04:19 )  Alb: 2.1 g/dL / Pro: 5.5 g/dL / ALK PHOS: 797 U/L / ALT: 1946 U/L / AST: >7000 U/L / GGT: x           PT/INR - ( 20 Dec 2022 01:30 )   PT: 19.5 sec;   INR: 1.67 ratio         PTT - ( 21 Dec 2022 04:19 )  PTT:51.3 sec          Imaging:

## 2022-12-21 NOTE — PROGRESS NOTE ADULT - ASSESSMENT
Patient is a 67 F PMH uterine ca (s/p hysterectomy, chemo/RT, in remission as of 4 months ago), BIBEMS to [place], p/w 2 weeks painless jaundice and progressive failure to thrive, 1 day epistaxis and vaginal bleeding; found to have likely new metastatic cholangiocarcinoma and acute renal failure (K 5.8, Cr 7.29); transferred to [facility] ED for further management. Patient is elevated bilirubin >40, altered mental status, admitted to MICU. Patient was also started on HD. Palliative consulted for GOC.

## 2022-12-21 NOTE — PROGRESS NOTE ADULT - SUBJECTIVE AND OBJECTIVE BOX
[facility] DIVISION OF KIDNEY DISEASES AND HYPERTENSION -- FOLLOW UP NOTE  --------------------------------------------------------------------------------  HPI: 66 yo F with history of uterine cancer, received chemotherapy and radiation (last treatment ~4 months ago at [facility]) initially presented to [facility] on 12/14 for painless jaundice, decreased appetite, nausea/vomiting, and generalized weakness. 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 Pt was transferred to [facility] on 12/15 for GI evaluation and possible ERCP. Upon review of [facility], Scr was initially 7.29 on 12/15/22. Pt received HD session on 12/15/22 for hyperkalemia and worsening metabolic acidosis.  Last HD session was 12/19 limited catheter flow.    Pt was seen and evaluated this morning in the CTICU. Pt is lethargic and not very verbally responsive. Unable to obtain ROS.   Overnight noted ot have worsneing hypotension with increasing vasopressor requirements.      PAST HISTORY  --------------------------------------------------------------------------------  No significant changes to PMH, PSH, FHx, SHx, unless otherwise noted    ALLERGIES & MEDICATIONS  --------------------------------------------------------------------------------  Allergies    No Known Allergies    Intolerances      Standing Inpatient Medications  chlorhexidine 2% Cloths 1 Application(s) Topical <User Schedule>  dextrose 50% Injectable 25 Gram(s) IV Push once  dextrose 50% Injectable 12.5 Gram(s) IV Push once  dextrose 50% Injectable 25 Gram(s) IV Push once  dextrose Oral Gel 15 Gram(s) Oral once  glucagon  Injectable 1 milliGRAM(s) IntraMuscular once  hydrocortisone sodium succinate Injectable 50 milliGRAM(s) IV Push every 6 hours  meropenem  IVPB 500 milliGRAM(s) IV Intermittent every 24 hours  midodrine 30 milliGRAM(s) Oral every 8 hours  norepinephrine Infusion 0.05 MICROgram(s)/kG/Min IV Continuous <Continuous>    PRN Inpatient Medications      REVIEW OF SYSTEMS  Unable to obtain     VITALS/PHYSICAL EXAM  --------------------------------------------------------------------------------  T(C): 36.6 (12-21-22 @ 10:45), Max: 37 (12-21-22 @ 00:00)  HR: 77 (12-21-22 @ 11:00) (77 - 94)  BP: 134/77 (12-21-22 @ 11:00) (89/50 - 145/80)  RR: 22 (12-21-22 @ 11:00) (17 - 36)  SpO2: 97% (12-21-22 @ 11:00) (91% - 97%)  Wt(kg): --    12-20-22 @ 07:01  -  12-21-22 @ 07:00  --------------------------------------------------------  IN: 1145 mL / OUT: 270 mL / NET: 875 mL    12-21-22 @ 07:01  -  12-21-22 @ 11:06  --------------------------------------------------------  IN: 454 mL / OUT: 440 mL / NET: 14 mL    Physical Exam:  	Gen: ill appearing  	HEENT: Scleral icterus  	Pulm: CTA B/L  	CV: S1S2  	Abd: Soft, +BS   	Ext: trace LE edema B/L  	Neuro: Awake but lethargic  	Skin: jaundice  	Vascular access: RIJ non-tunneled HD catheter      LABS/STUDIES  --------------------------------------------------------------------------------              9.9    20.75 >-----------<  125      [12-21-22 @ 04:19]              29.7     132  |  95  |  71  ----------------------------<  94      [12-21-22 @ 04:19]  5.4   |  13  |  5.69        Ca     8.7     [12-21-22 @ 04:19]      Mg     2.50     [12-21-22 @ 04:19]      Phos  8.4     [12-21-22 @ 04:19]    TPro  5.5  /  Alb  2.1  /  TBili  34.9  /  DBili  x   /  AST  >7000  /  ALT  1946  /  AlkPhos  797  [12-21-22 @ 04:19]    PT/INR: PT 17.7 , INR 1.52       [12-21-22 @ 08:46]  PTT: 47.6       [12-21-22 @ 08:46]      Creatinine Trend:  SCr 5.69 [12-21 @ 04:19]  SCr 4.70 [12-20 @ 01:30]  SCr 5.54 [12-19 @ 02:30]  SCr 5.25 [12-18 @ 21:22]  SCr 5.19 [12-18 @ 19:45]    Urinalysis - [12-18-22 @ 10:39]      Color Katina / Appearance Turbid / SG 1.021 / pH 6.0      Gluc Negative / Ketone Trace  / Bili Large / Urobili <2 mg/dL       Blood Large / Protein 100 mg/dL / Leuk Est Negative / Nitrite Negative      RBC 10 / WBC 2 / Hyaline  / Gran  / Sq Epi  / Non Sq Epi 1 / Bacteria Few    Urine Creatinine 115      [12-18-22 @ 10:39]  Urine Protein 116      [12-15-22 @ 20:30]  Urine Sodium 42      [12-18-22 @ 10:39]  Urine Urea Nitrogen 394.0      [12-18-22 @ 10:39]  Urine Potassium 40.7      [12-15-22 @ 20:30]  Urine Osmolality 354      [12-15-22 @ 20:30]    TSH 0.28      [12-19-22 @ 07:00]    HBsAb <3.0      [12-15-22 @ 23:40]  HBsAg Nonreact      [12-15-22 @ 23:40]  HBcAb Nonreact      [12-15-22 @ 23:40]

## 2022-12-21 NOTE — PROGRESS NOTE ADULT - PROBLEM SELECTOR PLAN 3
Pt with high anion gap metabolic acidosis in the setting of DEMARCO. pH is 7.27, and SCO2 is 11. Underwent HD session on 12/15/22. Serum CO2 improved to 17 today. Plan for HD, as above. Monitor pH and SCO2    If you have any questions, please feel free to contact me  Artemio Alegre  Nephrology Fellow  [phone]/ [employer] Teams(Preferred)  (After 5pm or on weekends please page the on-call fellow).
Pt with high anion gap metabolic acidosis in the setting of DEMARCO. Pt. was initiated on HD 12/15/22; last HD session was 12/19/22.. Serum [id number] low at 16 this morning prior to HD. Monitor pH and SCO2.     If any questions, please feel free to contact me     Carson Alexandre  Nephrology Fellow  [facility] Pager: [phone]
Pt with high anion gap metabolic acidosis in the setting of DEMARCO. Pt. was initiated on HD 12/15/22; last HD session was 12/19/22. Serum CO2 remained low at 13 this morning, with pH 7.2. Plan for HD as mentioned above. Monitor pH and SCO2.     If any questions, please feel free to contact me     Carson Alexandre  Nephrology Fellow  [facility] Pager: [phone].
Pt with high anion gap metabolic acidosis in the setting of DEMARCO. pH is 7.27, and SCO2 is 11. Underwent HD session on 12/15/22 and then on 12/17/22. Serum CO2 improved to 21 today. Monitor pH and SCO2    If you have any questions, please feel free to contact me  Artemio Alegre  Nephrology Fellow  [phone]/ [employer] Teams(Preferred)  (After 5pm or on weekends please page the on-call fellow).
Pt with high anion gap metabolic acidosis in the setting of DEMARCO. Pt. was initiated on HD 12/15/22; last HD session was 12/19/22. Serum [id number] low at 17 this morning. Monitor pH and SCO2.     If any questions, please feel free to contact me     Carson Alexandre  Nephrology Fellow  [facility] Pager: [phone].
- recently treated for uterine cancer, however now with possible new cancer.

## 2022-12-21 NOTE — CHART NOTE - NSCHARTNOTEFT_GEN_A_CORE
continued discussion with daughter America, niece and other family members on conference call and attending. answered all questions and provided numbers of worsening kidney and liver function as per family wishes. Patient's family asking how cancer could have progressed however explained that patient had CT abdomen and pelvis 7/29/2022 revealing overall increased peritoneal disease with new adrenal metastases with subsequent 9/21/2022 biopsy proving metastatic adenocarcinoma consistent with GYN origin from polyp from colonoscopy. Given that this progression continued after surgery, radiation, multiple lines of chemo and immunotherapy ct scan results from in house likely reflecting progression rather than new secondary cancer. Furthermore, with continued kidney failure, liver failure, and need for pressors patient has not improved despite medical therapy. At this point family still needs time to discuss and inquiring what palliative care is and what were the next steps. Provided answers to all questions, will continue to provide support.     Nick Koehler PGY4 PCCM Fellow

## 2022-12-22 NOTE — PROGRESS NOTE ADULT - ASSESSMENT
67F Hx uterine ca (s/p hysterectomy, chemo/RT, in remission as of 4 months ago), BIBEMS to [place], p/w 2 weeks painless jaundice and progressive failure to thrive, 1 day epistaxis and vaginal bleeding; found to have likely metastatic cholangiocarcinoma and acute renal failure (K 5.8, Cr 7.29), transferred to [facility] for further management, currently in MICU, course c/b worsening hepatic function      Impression:   #hyperbilirubinemia - C/f cholangiocarcinoma: CT A/P non showing abnormal gallbladder, baldomero hepatis, with marked intrahepatic ductal dilatation and multiple lesions in the liver as well as thickening of the distal stomach, proximal duodenum, and enlargement of the pancreatic head and uncinate process. Currently in shock on levo, being followed by IR for possible percutaneous cholecystotomy tube drainage given lack of stability, however ongoing GOC with family, considering comfort measures    #Shock  #Acute renal failure   #Uterine CA s/p hysterectomy/chemo/RT      Recommendations  - GOC discussion  - f/u IR recs  - trend liver enzymes, c/w broad spectrum abx  - no planned endoscopic procedure  - rest of care per primary team    GI will sign off. Please reconsult as necessary    All recommendations are tentative until note is attested by attending.     Rupesh Jones, PGY6  Gastroenterology/Hepatology Fellow  During weekdays: Available on [employer] Teams or pager:[phone] ([facility] Short Range Pager); [phone] (Long Range Pager)  Overnight/Weekend: Please page the on-call GI fellow

## 2022-12-22 NOTE — PROGRESS NOTE ADULT - SUBJECTIVE AND OBJECTIVE BOX
[facility] Division of Kidney Diseases & Hypertension  FOLLOW UP NOTE  --------------------------------------------------------------------------------    HPI: 68 yo F with history of uterine cancer, received chemotherapy and radiation (last treatment ~4 months ago at [place]) initially presented to [facility] on 12/14 for painless jaundice, decreased appetite, nausea/vomiting, and generalized weakness. 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 Pt was transferred to [facility] on 12/15 for GI evaluation and possible ERCP. Upon review of [place], Scr was initially 7.29 on 12/15/22. Pt received HD session on 12/15/22 for hyperkalemia and worsening metabolic acidosis.  Last HD session was 12/21.    Pt was seen and evaluated this morning in the CTICU. Pt is lethargic and not very verbally responsive. Unable to obtain ROS.       PAST HISTORY  --------------------------------------------------------------------------------  No significant changes to PMH, PSH, FHx, SHx, unless otherwise noted    ALLERGIES & MEDICATIONS  --------------------------------------------------------------------------------  Allergies    No Known Allergies    Intolerances      Standing Inpatient Medications  chlorhexidine 2% Cloths 1 Application(s) Topical <User Schedule>  dextrose 50% Injectable 25 Gram(s) IV Push once  dextrose 50% Injectable 12.5 Gram(s) IV Push once  dextrose 50% Injectable 25 Gram(s) IV Push once  dextrose Oral Gel 15 Gram(s) Oral once  glucagon  Injectable 1 milliGRAM(s) IntraMuscular once  hydrocortisone sodium succinate Injectable 50 milliGRAM(s) IV Push every 6 hours  meropenem  IVPB 500 milliGRAM(s) IV Intermittent every 24 hours  midodrine 30 milliGRAM(s) Oral every 8 hours  norepinephrine Infusion 0.05 MICROgram(s)/kG/Min IV Continuous <Continuous>    PRN Inpatient Medications      REVIEW OF SYSTEMS  --------------------------------------------------------------------------------  unable to obtain    VITALS/PHYSICAL EXAM  --------------------------------------------------------------------------------  T(C): 36.3 (12-22-22 @ 04:00), Max: 36.8 (12-22-22 @ 00:00)  HR: 84 (12-22-22 @ 08:00) (74 - 89)  BP: 116/67 (12-22-22 @ 08:00) (85/53 - 134/77)  ABP: --  ABP(mean): --  RR: 26 (12-22-22 @ 08:00) (18 - 33)  SpO2: 97% (12-22-22 @ 06:00) (92% - 97%)  CVP(mm Hg): --    Weight (kg): 90.8 (12-22-22 @ 05:00)      12-21-22 @ 07:01  -  12-22-22 @ 07:00  --------------------------------------------------------  IN: 1156 mL / OUT: 460 mL / NET: 696 mL    Physical Exam:  	Gen: ill appearing  	HEENT: Scleral icterus  	Pulm: CTA B/L  	CV: S1S2  	Abd: Soft, +BS   	Ext: trace LE edema B/L  	Neuro: Awake but lethargic  	Skin: jaundice  	Vascular access: RIJ non-tunneled HD catheter    LABS/STUDIES  --------------------------------------------------------------------------------              8.6    21.25 >-----------<  81       [12-22-22 @ 02:50]              25.6     135  |  96  |  64  ----------------------------<  116      [12-22-22 @ 02:50]  4.6   |  16  |  4.82        Ca     8.7     [12-22-22 @ 02:50]      Mg     2.30     [12-22-22 @ 02:50]      Phos  6.9     [12-22-22 @ 02:50]    TPro  5.2  /  Alb  1.9  /  TBili  34.8  /  DBili  x   /  AST  >7000  /  ALT  2015  /  AlkPhos  980  [12-22-22 @ 02:50]    PT/INR: PT 17.7 , INR 1.52       [12-21-22 @ 08:46]  PTT: 44.5       [12-22-22 @ 02:50]      Creatinine Trend:  SCr 4.82 [12-22 @ 02:50]  SCr 5.69 [12-21 @ 04:19]  SCr 4.70 [12-20 @ 01:30]  SCr 5.54 [12-19 @ 02:30]  SCr 5.25 [12-18 @ 21:22]    Urinalysis - [12-18-22 @ 10:39]      Color Katina / Appearance Turbid / SG 1.021 / pH 6.0      Gluc Negative / Ketone Trace  / Bili Large / Urobili <2 mg/dL       Blood Large / Protein 100 mg/dL / Leuk Est Negative / Nitrite Negative      RBC 10 / WBC 2 / Hyaline  / Gran  / Sq Epi  / Non Sq Epi 1 / Bacteria Few    Urine Creatinine 115      [12-18-22 @ 10:39]  Urine Protein 116      [12-15-22 @ 20:30]  Urine Sodium 42      [12-18-22 @ 10:39]  Urine Urea Nitrogen 394.0      [12-18-22 @ 10:39]  Urine Potassium 40.7      [12-15-22 @ 20:30]  Urine Osmolality 354      [12-15-22 @ 20:30]    TSH 0.28      [12-19-22 @ 07:00]    HBsAb <3.0      [12-15-22 @ 23:40]  HBsAg Nonreact      [12-15-22 @ 23:40]  HBcAb Nonreact      [12-15-22 @ 23:40]

## 2022-12-22 NOTE — PROGRESS NOTE ADULT - ASSESSMENT
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     #Neuro  A&Ox3  - Pt somewhat improved mentation i/s/o uremia in setting of renal failure  - ammonia not elevated, will continue to trend in the AM  - on meropenem/ vanc by level for possible infection.  - ABG without hypercapnia, serial VBGs unchanged  - although neuro exam not indicative of local stroke and w/o hx of brain mets, in the setting of increased coagulopathy will need to consider CTH if neuro exam changes, had outpatient MRI 10/4/2021 w/o evidence of intracranial metases, had some chronic microvascular diseases  for pain control only tolerating decreased levels of dilaudid will continue intermittent 0.25 dilaudid IVP as necessary      #PULMONARY   CT with consolidation in lingula and RML - atelectasis vs. pneumonia  CXR with right base atelectasis vs infiltrate   - Saturating well on minimal oxygen supplementation may have some component of central apnea vs [place] as patient becomes mildly hypoxemic when oversedated  - continue to monitor with worsening mental; status       #CV  - patient with worsening oncotic pressure and vasoplegia secondary to worsening liver function in the setting of possible septic shock  - midodrine 30 q8 ordered however not taking due to lack of mental status to take po medications   - titrate levophed to sustain MAP>65  - patient also with cardiac dysfunction with regards to troponinemia with ABBE not a candidate for apt, AC, cath. should keep Hgb> 10 due to active ischemia  - repeat formal echo: grossly normal LV SF, RVSF, no WMA or diastolic dysfxn  - stress dose steroids 50 q6 started 12/20: has mid day cortisol level indeterminate range, increasing pressors, borderline low sugars,       #GI  Malignant obstruction; Alk phos 600 on admission  - CT 12/15 c/f cholangiocarcinoma metastatic to liver, gallbladder abnormalities, adrenal enlargement , pancreatic enlargement. 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 Outpatient, patient was evaled by oncologist with worsening abdominal mass and was recommended for CT. Received CT at hospital revealing new cholangiocarcinoma. Was planning hospice and chemo was stopped as uterine cancer was refractory to treatment  - Surgery with no plans on interventions   - GI recommending MR MRCP pending vs  IR for more urgent biliary decompression (+/- biopsy) however likely too unstable will discuss with family and IR: would require intubation and improvement in stability and no longer planning for endoscopic eval  - Ca 199 not elevated, CEA not elevated,  elevated at 1031  - Pain management as needed   - continues to be going into active liver failure MELD NA: 38, low plt, elevated INR  - NPO   - RUQ ultrasound: Bile ducts: Common bile duct contains debris and measures 0.8 cm.  Distended gallbladder with sludge and thick wall    #/RENAL  No history of kidney disease, admission SCr 7.29 in Acute Renal Failure with hyperkalemia, AGMA  - possible ATN vs bile cast nephropathy vs pre-renal etiology.   - Urine protein, urine creatinine, and urine sodium wnl   - renal US without hydro  - Strict I&O's   - no plans on CRRT although will attempt pressor assisted HD 12/21      #INFECTIOUS DISEASE   - Afebrile, no leukocytosis, RVP negative however consolidation noted in lingula/RML atelectasis vs. pneumonia  - Monitor for cholangitis   - MRSA swab negative, blood cultures NGTD, U culture with <100K pansensitive Ecoli   - Adjust antibiotics per HD  - initially zosyn, escalated to meropenem for increasing WBC, worsening pressors,   - added vanc by level    #Endocrine   Enlarged adrenal glands  - cortisol remains at 14  - thyroid studies TSH T4 normal T3 low  - stress dose steroids 50 q6 started 12/20: has mid day cortisol level indeterminate range, increasing pressors, borderline low sugars,   - may need to initiate D10     #Hematology/ppx   Noted to have vaginal bleeding, recent epistaxis in setting of uremia. Improving s/p dialysis  INR elevated with mild bleeding from site, Monitor coags, improved after vit K  maintain active type and screen, keep Hgb >10 given active ischemia  possible PVT on renal US, will need to follow up  Onc to weigh in on second opinion.    #DVT AND GI PROPHYLAXIS  Hold chemo PPX iso uremic platelet dysfunction, and autocoagulopathy  SCDs    #GOC:   -Full code. Daughter is NOK per patient. have had multiple discussions with family both immediate and extended family, will continue to speak. please see [facility] notes for further details   -Onc consulted for second opinion, gave records. No further treatment from onc. If pt improves, can go to [facility].

## 2022-12-22 NOTE — PROGRESS NOTE ADULT - SUBJECTIVE AND OBJECTIVE BOX
SURGERY DAILY PROGRESS NOTE:     SUBJECTIVE/24hr Events:     Patient seen and examined on am rounds. Pt with worsening hepatic failure and renal failure.      OBJECTIVE:    MEDICATIONS  (STANDING):  chlorhexidine 2% Cloths 1 Application(s) Topical <User Schedule>  dextrose 50% Injectable 25 Gram(s) IV Push once  dextrose 50% Injectable 12.5 Gram(s) IV Push once  dextrose 50% Injectable 25 Gram(s) IV Push once  dextrose Oral Gel 15 Gram(s) Oral once  glucagon  Injectable 1 milliGRAM(s) IntraMuscular once  hydrocortisone sodium succinate Injectable 50 milliGRAM(s) IV Push every 6 hours  meropenem  IVPB 500 milliGRAM(s) IV Intermittent every 24 hours  midodrine 30 milliGRAM(s) Oral every 8 hours  norepinephrine Infusion 0.05 MICROgram(s)/kG/Min (7.16 mL/Hr) IV Continuous <Continuous>    MEDICATIONS  (PRN):      Vital Signs Last 24 Hrs  T(C): 36.3 (22 Dec 2022 04:00), Max: 36.8 (22 Dec 2022 00:00)  T(F): 97.3 (22 Dec 2022 04:00), Max: 98.2 (22 Dec 2022 00:00)  HR: 84 (22 Dec 2022 08:00) (74 - 89)  BP: 116/67 (22 Dec 2022 08:00) (85/53 - 145/80)  BP(mean): 83 (22 Dec 2022 08:00) (63 - 94)  RR: 26 (22 Dec 2022 08:00) (18 - 33)  SpO2: 97% (22 Dec 2022 06:00) (92% - 97%)    Parameters below as of 22 Dec 2022 08:00  Patient On (Oxygen Delivery Method): nasal cannula  O2 Flow (L/min): 2        I&O's Detail    21 Dec 2022 07:01  -  22 Dec 2022 07:00  --------------------------------------------------------  IN:    IV PiggyBack: 50 mL    Norepinephrine: 706 mL    Other (mL): 400 mL  Total IN: 1156 mL    OUT:    Indwelling Catheter - Urethral (mL): 60 mL    Other (mL): 400 mL  Total OUT: 460 mL    Total NET: 696 mL              LABS:                        8.6    21.25 )-----------( 81       ( 22 Dec 2022 02:50 )             25.6     12-22    135  |  96<L>  |  64<H>  ----------------------------<  116<H>  4.6   |  16<L>  |  4.82<H>    Ca    8.7      22 Dec 2022 02:50  Phos  6.9     12-22  Mg     2.30     12-22    TPro  5.2<L>  /  Alb  1.9<L>  /  TBili  34.8<H>  /  DBili  x   /  AST  >7000  /  ALT  2015<H>  /  AlkPhos  980<H>  12-22    PT/INR - ( 21 Dec 2022 08:46 )   PT: 17.7 sec;   INR: 1.52 ratio         PTT - ( 22 Dec 2022 02:50 )  PTT:44.5 sec

## 2022-12-22 NOTE — PROGRESS NOTE ADULT - ATTENDING COMMENTS
Pt seen and examined. 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 Remains on pressor support, titrate to keep MAP >65, ongoing lactic acidosis, cont to follow lactate level. Remains on ABx coverage with Meropenem and Vanc (by level). Progressive leukocytosis, BCxs remain NGTD, UCx with 50-99 K E coli. Cont stress dose steroids. Last HD yesterday, hyperkalemia improved, no hydronephrosis on renal US, cont to follow UO. Troponin level trending up, no wall motion abnormality on TTE, likely demand ischemia. Not a candidate for AC d/t ongoing coagulopathy and thrombocytopenia. Progressive transaminitis and hyperbilirubinemia, not a candidate for surgical or GI intervention and determined not to be stable for intervention by IR team as well. Ongoing GOC discussions with patient's HCP America and other family members. We reiterated that the patient remains critically ill with multiorgan failure and is unlikely to survive this hospitalization. Additionally we explained that current aggressive interventions are unlikely to change her disease trajectory and CPR will likely be medically futile.

## 2022-12-22 NOTE — PROGRESS NOTE ADULT - SUBJECTIVE AND OBJECTIVE BOX
Gastroenterology/Hepatology Progress Note      Interval Events:   - worsening hepatic failure and renal failure  - ongoing GOC discussion    Allergies:  No Known Allergies      Hospital Medications:  chlorhexidine 2% Cloths 1 Application(s) Topical <User Schedule>  dextrose 50% Injectable 25 Gram(s) IV Push once  dextrose 50% Injectable 12.5 Gram(s) IV Push once  dextrose 50% Injectable 25 Gram(s) IV Push once  dextrose Oral Gel 15 Gram(s) Oral once  glucagon  Injectable 1 milliGRAM(s) IntraMuscular once  hydrocortisone sodium succinate Injectable 50 milliGRAM(s) IV Push every 6 hours  meropenem  IVPB 500 milliGRAM(s) IV Intermittent every 24 hours  midodrine 30 milliGRAM(s) Oral every 8 hours  norepinephrine Infusion 0.05 MICROgram(s)/kG/Min IV Continuous <Continuous>      ROS: 14 point ROS negative unless otherwise state in subjective    PHYSICAL EXAM:   Vital Signs:  Vital Signs Last 24 Hrs  T(C): 36.3 (22 Dec 2022 04:00), Max: 36.8 (22 Dec 2022 00:00)  T(F): 97.3 (22 Dec 2022 04:00), Max: 98.2 (22 Dec 2022 00:00)  HR: 84 (22 Dec 2022 08:00) (74 - 89)  BP: 116/67 (22 Dec 2022 08:00) (85/53 - 145/80)  BP(mean): 83 (22 Dec 2022 08:00) (63 - 94)  RR: 26 (22 Dec 2022 08:00) (18 - 33)  SpO2: 97% (22 Dec 2022 06:00) (92% - 97%)    Parameters below as of 22 Dec 2022 08:00  Patient On (Oxygen Delivery Method): nasal cannula  O2 Flow (L/min): 2    Daily     Daily     GENERAL:  No acute distress  HEENT:  NCAT, no scleral icterus  CHEST: no resp distress on NC  HEART:  RRR  ABDOMEN:  Soft, non-tender, non-distended, normoactive bowel sounds, no masses  EXTREMITIES:  No cyanosis, clubbing, or edema  SKIN:  No rash/erythema/ecchymoses/petechiae/wounds/abscess/warm/dry  NEURO:  Alert and oriented x 3, no asterixis, no tremor    LABS:                        8.6    21.25 )-----------( 81       ( 22 Dec 2022 02:50 )             25.6     Mean Cell Volume: 82.8 fL (12-22-22 @ 02:50)    12-22    135  |  96<L>  |  64<H>  ----------------------------<  116<H>  4.6   |  16<L>  |  4.82<H>    Ca    8.7      22 Dec 2022 02:50  Phos  6.9     12-22  Mg     2.30     12-22    TPro  5.2<L>  /  Alb  1.9<L>  /  TBili  34.8<H>  /  DBili  x   /  AST  >7000  /  ALT  2015<H>  /  AlkPhos  980<H>  12-22    LIVER FUNCTIONS - ( 22 Dec 2022 02:50 )  Alb: 1.9 g/dL / Pro: 5.2 g/dL / ALK PHOS: 980 U/L / ALT: 2015 U/L / AST: >7000 U/L / GGT: x           PT/INR - ( 21 Dec 2022 08:46 )   PT: 17.7 sec;   INR: 1.52 ratio         PTT - ( 22 Dec 2022 02:50 )  PTT:44.5 sec          Imaging:

## 2022-12-22 NOTE — PROGRESS NOTE ADULT - PROBLEM SELECTOR PLAN 1
Pt with severe DEMARCO in the setting of hypotension, decreased PO intake, and hyperbilirubinemia. Pt denies any prior known history of kidney disease. Upon review of [facility], Scr was initially 7.29 on 12/15/22. UA shows hematuria, and proteinuria. Bilirubin level significantly elevated at 32.7. No evidence of obstruction on CT abdomen. 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 Pt received first session of HD session on 12/15/22 for hyperkalemia and worsening metabolic acidosis. Spot urine TP/CR is elevated at 1.1. Renal US ruled out hydronephrosis. Labs reviewed. Last HD was 12/21/22. Pt on increasing IV vasopressors. Will plan for HD tomorrow.  If unable to tolerate then the next step would be for CRRT given her hemodynamic instability. Monitor labs and urine output. Dose medications as per eGFR. The patient is a 72y Male complaining of abdominal pain.

## 2022-12-22 NOTE — PROGRESS NOTE ADULT - SUBJECTIVE AND OBJECTIVE BOX
INTERVAL HPI/OVERNIGHT EVENTS: no overnight events, pressor requirement and lactate slightly improved ht    SUBJECTIVE: Patient seen and examined at bedside. slightly more interactive today     ROS limited due to lethargy, however denies significant abdominal pain, occasionally has back pain due to positioning, no nausea although without appetite. denies chest pain.   OBJECTIVE:    VITAL SIGNS:  ICU Vital Signs Last 24 Hrs  T(C): 36.3 (22 Dec 2022 04:00), Max: 36.8 (22 Dec 2022 00:00)  T(F): 97.3 (22 Dec 2022 04:00), Max: 98.2 (22 Dec 2022 00:00)  HR: 84 (22 Dec 2022 08:00) (74 - 89)  BP: 116/67 (22 Dec 2022 08:00) (85/53 - 121/72)  BP(mean): 83 (22 Dec 2022 08:00) (63 - 87)  ABP: --  ABP(mean): --  RR: 26 (22 Dec 2022 08:00) (18 - 33)  SpO2: 97% (22 Dec 2022 06:00) (92% - 97%)    O2 Parameters below as of 22 Dec 2022 08:00  Patient On (Oxygen Delivery Method): nasal cannula  O2 Flow (L/min): 2            12-21 @ 07:01  -  12-22 @ 07:00  --------------------------------------------------------  IN: 1156 mL / OUT: 460 mL / NET: 696 mL      CAPILLARY BLOOD GLUCOSE      POCT Blood Glucose.: 101 mg/dL (21 Dec 2022 17:15)      PHYSICAL EXAM:    General: NAD  HEENT: NC/AT; PERRL, scleral icterus  Neck: supple  Respiratory: CTA b/l  Cardiovascular: +S1/S2; RRR  Abdomen: soft, NT/ND; +BS x4  Extremities: WWP, 2+ peripheral pulses b/l; no LE edema  Skin: jaundiced   Neurological: ao2-3, lethargic, no focal deficits    MEDICATIONS:  MEDICATIONS  (STANDING):  chlorhexidine 2% Cloths 1 Application(s) Topical <User Schedule>  dextrose 50% Injectable 25 Gram(s) IV Push once  dextrose 50% Injectable 12.5 Gram(s) IV Push once  dextrose 50% Injectable 25 Gram(s) IV Push once  dextrose Oral Gel 15 Gram(s) Oral once  glucagon  Injectable 1 milliGRAM(s) IntraMuscular once  hydrocortisone sodium succinate Injectable 50 milliGRAM(s) IV Push every 6 hours  meropenem  IVPB 500 milliGRAM(s) IV Intermittent every 24 hours  midodrine 30 milliGRAM(s) Oral every 8 hours  norepinephrine Infusion 0.05 MICROgram(s)/kG/Min (7.16 mL/Hr) IV Continuous <Continuous>    MEDICATIONS  (PRN):      ALLERGIES:  Allergies    No Known Allergies    Intolerances        LABS:                        8.6    21.25 )-----------( 81       ( 22 Dec 2022 02:50 )             25.6     12-22    135  |  96<L>  |  64<H>  ----------------------------<  116<H>  4.6   |  16<L>  |  4.82<H>    Ca    8.7      22 Dec 2022 02:50  Phos  6.9     12-22  Mg     2.30     12-22    TPro  5.2<L>  /  Alb  1.9<L>  /  TBili  34.8<H>  /  DBili  x   /  AST  >7000  /  ALT  2015<H>  /  AlkPhos  980<H>  12-22    PT/INR - ( 21 Dec 2022 08:46 )   PT: 17.7 sec;   INR: 1.52 ratio         PTT - ( 22 Dec 2022 02:50 )  PTT:44.5 sec      RADIOLOGY & ADDITIONAL TESTS: Reviewed.    Authored by Dr. Nick RIOS [id number], [phone]

## 2022-12-22 NOTE — PROGRESS NOTE ADULT - ASSESSMENT
66 y/o F p/w fatigue and sepsis in setting of biliary malignancy.    -Pt not a surgical candidate at this time  -Worsening renal and hepatic function, no intervention per GI and IR   -Ongoing GOC conversations]  - Please page with additional questions or concerns   -Care per MICU     Surgical Oncology  #[id number]

## 2022-12-23 NOTE — PROGRESS NOTE ADULT - ATTENDING COMMENTS
Pt seen and examined. 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 Remains on pressor support, titrate to keep MAP >65, lactate trending down, cont to follow lactate level. Remains on ABx coverage with Meropenem and Vanc (by level). Progressive leukocytosis, BCxs remain NGTD, UCx with 50-99 K E coli. Cont stress dose steroids. Plan for HD today, no hydronephrosis on renal US, cont to follow UO. Troponin level elevated, initial concern for STEMI but no wall motion abnormality on TTE, likely demand ischemia. Not a candidate for AC d/t ongoing coagulopathy and thrombocytopenia. Progressive transaminitis and hyperbilirubinemia, not a candidate for surgical or GI intervention and determined not to be stable for intervention by IR team as well. Ongoing GOC discussions with patient's HCP [place] and other family members. We reiterated that the patient remains critically ill with multiorgan failure and is unlikely to survive this hospitalization. Additionally we explained that current aggressive interventions are unlikely to change her disease trajectory and CPR will likely be medically futile. Her HCP America reports that she is unable to make any decisions until her sister is able to visit.

## 2022-12-23 NOTE — CHART NOTE - NSCHARTNOTEFT_GEN_A_CORE
IR Follow-Up     consulted for possible biliary drainage.    on-going GOC discussions. no intervention planned at this time.    please re-consult as needed.     --  Omid Michel DO/NEETA  Interventional Radiology Resident (PGY-4)  Available on [employer] TEAMS    For EMERGENT inquiries/questions:  IR Pager ([facility]): [phone]  IR Pager ([facility]): [phone] ; [phone]    For non-emergent consults/questions:   Please place a sunrise order "Consult- Interventional Radiology" with an appropriate callback number    For questions about scheduling during appropriate work hours, call IR :  [facility]: [phone]  [facility]: [phone]    For outpatient IR booking:  [facility]: [phone]  [facility]: [phone]

## 2022-12-23 NOTE — PROGRESS NOTE ADULT - SUBJECTIVE AND OBJECTIVE BOX
CHIEF COMPLAINT:    NTERVAL HPI/OVERNIGHT EVENTS: no overnight events, decreased pressor requirement       HPI:  Ms. 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     Patient was at usual state of health performing IADLs until 2 weeks prior to admission when she developed scleral icterus, pruritus, intolerance of solid food, and reduced PO intake. She endorsed a feeling of prolonged fullness with meals that would eventually on occasion prompt her to induce vomiting. Last had broth on 12/14 lunch 1 day prior to admission; had tea this morning. Also endorsed HERNANDEZ, "puffy" abdomen, light colored stools (at bedside), +dark urine. No fevers, chills, confusion, tremors, abdominal pain, sick contacts, bloody vomit/bloody stool, petechial rash/bruising/other bleeding.    Notably K 5.9 at OSH/ED, received calcium gluconate and Lokelma.    (15 Dec 2022 17:50)      SUBJECTIVE: Patient seen and examined at bedside. slightly more interactive today     REVIEW OF SYSTEMS:  ROS limited due to lethargy, however denies significant abdominal pain, occasionally has back pain due to positioning, no nausea although without appetite. denies chest pain.   [X] All other systems negative  [ ] Unable to assess ROS because ________    OBJECTIVE:  ICU Vital Signs Last 24 Hrs  T(C): 37.1 (23 Dec 2022 04:00), Max: 37.3 (22 Dec 2022 20:00)  T(F): 98.7 (23 Dec 2022 04:00), Max: 99.2 (22 Dec 2022 20:00)  HR: 86 (23 Dec 2022 06:00) (81 - 94)  BP: 100/63 (23 Dec 2022 06:00) (79/48 - 116/67)  BP(mean): 73 (23 Dec 2022 06:00) (59 - 89)  ABP: --  ABP(mean): --  RR: 19 (23 Dec 2022 06:00) (11 - 32)  SpO2: 98% (23 Dec 2022 06:00) (96% - 100%)    O2 Parameters below as of 23 Dec 2022 07:00  Patient On (Oxygen Delivery Method): nasal cannula w/ humidification  O2 Flow (L/min): 2            12-21 @ 07:01  -  12-22 @ 07:00  --------------------------------------------------------  IN: 1156 mL / OUT: 460 mL / NET: 696 mL    12-22 @ 07:01  -  12-23 @ 06:48  --------------------------------------------------------  IN: 337.3 mL / OUT: 15 mL / NET: 322.3 mL      CAPILLARY BLOOD GLUCOSE      POCT Blood Glucose.: 101 mg/dL (21 Dec 2022 17:15)      Physical Exam:   General: NAD  HEENT: NC/AT; PERRL, scleral icterus  Neck: supple  Respiratory: CTA b/l  Cardiovascular: +S1/S2; RRR  Abdomen: soft, NT/ND; +BS x4  Extremities: WWP, 2+ peripheral pulses b/l; no LE edema  Skin: jaundiced   Neurological: ao2-3, lethargic, no focal deficits    HOSPITAL MEDICATIONS:  Standing Meds:  chlorhexidine 2% Cloths 1 Application(s) Topical <User Schedule>  dextrose 50% Injectable 25 Gram(s) IV Push once  dextrose 50% Injectable 12.5 Gram(s) IV Push once  dextrose 50% Injectable 25 Gram(s) IV Push once  dextrose Oral Gel 15 Gram(s) Oral once  glucagon  Injectable 1 milliGRAM(s) IntraMuscular once  hydrocortisone sodium succinate Injectable 50 milliGRAM(s) IV Push every 6 hours  meropenem  IVPB 500 milliGRAM(s) IV Intermittent every 24 hours  midodrine 30 milliGRAM(s) Oral every 8 hours  norepinephrine Infusion 0.05 MICROgram(s)/kG/Min IV Continuous <Continuous>  vancomycin  IVPB 1250 milliGRAM(s) IV Intermittent once      PRN Meds:      LABS:                        7.1    21.29 )-----------( 64       ( 23 Dec 2022 02:07 )             21.8     Hgb Trend: 7.1<--, 8.8<--, 8.6<--, 9.9<--, 10.0<--  12-23    135  |  97<L>  |  94<H>  ----------------------------<  127<H>  4.7   |  16<L>  |  5.86<H>    Ca    8.5      23 Dec 2022 02:07  Phos  7.1     12-23  Mg     2.60     12-23    TPro  4.7<L>  /  Alb  1.9<L>  /  TBili  35.6<H>  /  DBili  x   /  AST  4837<H>  /  ALT  1378<H>  /  AlkPhos  1057<H>  12-23    Creatinine Trend: 5.86<--, 5.36<--, 4.82<--, 5.69<--, 4.70<--, 5.54<--  PT/INR - ( 23 Dec 2022 02:07 )   PT: 19.1 sec;   INR: 1.64 ratio         PTT - ( 23 Dec 2022 02:07 )  PTT:40.8 sec      Venous Blood Gas:  12-23 @ 02:07  7.34/34/53/18/81.7  VBG Lactate: 3.7  Venous Blood Gas:  12-22 @ 11:44  7.32/36/47/18/73.2  VBG Lactate: 3.6  Venous Blood Gas:  12-22 @ 02:50  7.37/32/71/18/94.8  VBG Lactate: 3.7  Venous Blood Gas:  12-21 @ 08:46  7.27/31/49/14/76.0  VBG Lactate: 6.5      MICROBIOLOGY:     RADIOLOGY:  [ ] Reviewed and interpreted by me                 CHIEF COMPLAINT:    NTERVAL HPI/OVERNIGHT EVENTS: no overnight events, decreased pressor requirement       HPI:  Ms. 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     Patient was at usual state of health performing IADLs until 2 weeks prior to admission when she developed scleral icterus, pruritus, intolerance of solid food, and reduced PO intake. She endorsed a feeling of prolonged fullness with meals that would eventually on occasion prompt her to induce vomiting. Last had broth on 12/14 lunch 1 day prior to admission; had tea this morning. Also endorsed HERNANDEZ, "puffy" abdomen, light colored stools (at bedside), +dark urine. No fevers, chills, confusion, tremors, abdominal pain, sick contacts, bloody vomit/bloody stool, petechial rash/bruising/other bleeding.    Notably K 5.9 at OSH/ED, received calcium gluconate and Lokelma.    (15 Dec 2022 17:50)      SUBJECTIVE: Patient seen and examined at bedside. lethargic this morning   REVIEW OF SYSTEMS:  ROS limited due to lethargy, however denies significant abdominal pain, occasionally has back pain due to positioning, no nausea although without appetite. denies chest pain.   [X] All other systems negative  [ ] Unable to assess ROS because ________    OBJECTIVE:  ICU Vital Signs Last 24 Hrs  T(C): 37.1 (23 Dec 2022 04:00), Max: 37.3 (22 Dec 2022 20:00)  T(F): 98.7 (23 Dec 2022 04:00), Max: 99.2 (22 Dec 2022 20:00)  HR: 86 (23 Dec 2022 06:00) (81 - 94)  BP: 100/63 (23 Dec 2022 06:00) (79/48 - 116/67)  BP(mean): 73 (23 Dec 2022 06:00) (59 - 89)  ABP: --  ABP(mean): --  RR: 19 (23 Dec 2022 06:00) (11 - 32)  SpO2: 98% (23 Dec 2022 06:00) (96% - 100%)    O2 Parameters below as of 23 Dec 2022 07:00  Patient On (Oxygen Delivery Method): nasal cannula w/ humidification  O2 Flow (L/min): 2            12-21 @ 07:01  -  12-22 @ 07:00  --------------------------------------------------------  IN: 1156 mL / OUT: 460 mL / NET: 696 mL    12-22 @ 07:01  -  12-23 @ 06:48  --------------------------------------------------------  IN: 337.3 mL / OUT: 15 mL / NET: 322.3 mL      CAPILLARY BLOOD GLUCOSE      POCT Blood Glucose.: 101 mg/dL (21 Dec 2022 17:15)      Physical Exam:   General: NAD  HEENT: NC/AT; PERRL, scleral icterus  Neck: supple  Respiratory: CTA b/l  Cardiovascular: +S1/S2; RRR  Abdomen: soft, NT/ND; +BS x4  Extremities: WWP, 2+ peripheral pulses b/l; no LE edema  Skin: jaundiced   Neurological: ao2-3, lethargic, no focal deficits    HOSPITAL MEDICATIONS:  Standing Meds:  chlorhexidine 2% Cloths 1 Application(s) Topical <User Schedule>  dextrose 50% Injectable 25 Gram(s) IV Push once  dextrose 50% Injectable 12.5 Gram(s) IV Push once  dextrose 50% Injectable 25 Gram(s) IV Push once  dextrose Oral Gel 15 Gram(s) Oral once  glucagon  Injectable 1 milliGRAM(s) IntraMuscular once  hydrocortisone sodium succinate Injectable 50 milliGRAM(s) IV Push every 6 hours  meropenem  IVPB 500 milliGRAM(s) IV Intermittent every 24 hours  midodrine 30 milliGRAM(s) Oral every 8 hours  norepinephrine Infusion 0.05 MICROgram(s)/kG/Min IV Continuous <Continuous>  vancomycin  IVPB 1250 milliGRAM(s) IV Intermittent once      PRN Meds:      LABS:                        7.1    21.29 )-----------( 64       ( 23 Dec 2022 02:07 )             21.8     Hgb Trend: 7.1<--, 8.8<--, 8.6<--, 9.9<--, 10.0<--  12-23    135  |  97<L>  |  94<H>  ----------------------------<  127<H>  4.7   |  16<L>  |  5.86<H>    Ca    8.5      23 Dec 2022 02:07  Phos  7.1     12-23  Mg     2.60     12-23    TPro  4.7<L>  /  Alb  1.9<L>  /  TBili  35.6<H>  /  DBili  x   /  AST  4837<H>  /  ALT  1378<H>  /  AlkPhos  1057<H>  12-23    Creatinine Trend: 5.86<--, 5.36<--, 4.82<--, 5.69<--, 4.70<--, 5.54<--  PT/INR - ( 23 Dec 2022 02:07 )   PT: 19.1 sec;   INR: 1.64 ratio         PTT - ( 23 Dec 2022 02:07 )  PTT:40.8 sec      Venous Blood Gas:  12-23 @ 02:07  7.34/34/53/18/81.7  VBG Lactate: 3.7  Venous Blood Gas:  12-22 @ 11:44  7.32/36/47/18/73.2  VBG Lactate: 3.6  Venous Blood Gas:  12-22 @ 02:50  7.37/32/71/18/94.8  VBG Lactate: 3.7  Venous Blood Gas:  12-21 @ 08:46  7.27/31/49/14/76.0  VBG Lactate: 6.5      MICROBIOLOGY:     RADIOLOGY:  [ ] Reviewed and interpreted by me

## 2022-12-23 NOTE — PROGRESS NOTE ADULT - SUBJECTIVE AND OBJECTIVE BOX
[facility] DIVISION OF KIDNEY DISEASES AND HYPERTENSION -- FOLLOW UP NOTE  --------------------------------------------------------------------------------  HPI: 68 yo F with history of uterine cancer, received chemotherapy and radiation (last treatment ~4 months ago at [place]) initially presented to [facility] on 12/14 for painless jaundice, decreased appetite, nausea/vomiting, and generalized weakness. 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 Pt was transferred to [facility] on 12/15 for GI evaluation and possible ERCP. Upon review of [facility], Scr was initially 7.29 on 12/15/22. Pt received HD session on 12/15/22 for hyperkalemia and worsening metabolic acidosis.  Last HD session was 12/21.    Pt was seen and evaluated this morning in the CTICU. Pt is lethargic and not very verbally responsive. Unable to obtain ROS.     PAST HISTORY  --------------------------------------------------------------------------------  No significant changes to PMH, PSH, FHx, SHx, unless otherwise noted    ALLERGIES & MEDICATIONS  --------------------------------------------------------------------------------  Allergies    No Known Allergies    Intolerances      Standing Inpatient Medications  chlorhexidine 2% Cloths 1 Application(s) Topical <User Schedule>  dextrose 50% Injectable 25 Gram(s) IV Push once  dextrose 50% Injectable 12.5 Gram(s) IV Push once  dextrose 50% Injectable 25 Gram(s) IV Push once  dextrose Oral Gel 15 Gram(s) Oral once  glucagon  Injectable 1 milliGRAM(s) IntraMuscular once  hydrocortisone sodium succinate Injectable 50 milliGRAM(s) IV Push every 6 hours  meropenem  IVPB 500 milliGRAM(s) IV Intermittent every 24 hours  midodrine 30 milliGRAM(s) Oral every 8 hours  norepinephrine Infusion 0.05 MICROgram(s)/kG/Min IV Continuous <Continuous>  vancomycin  IVPB 1250 milliGRAM(s) IV Intermittent once    PRN Inpatient Medications      REVIEW OF SYSTEMS  Unable to obtain    VITALS/PHYSICAL EXAM  --------------------------------------------------------------------------------  T(C): 37.1 (12-23-22 @ 04:00), Max: 37.3 (12-22-22 @ 20:00)  HR: 85 (12-23-22 @ 07:00) (81 - 94)  BP: 89/61 (12-23-22 @ 07:00) (79/48 - 113/67)  RR: 23 (12-23-22 @ 07:00) (11 - 32)  SpO2: 98% (12-23-22 @ 07:00) (96% - 100%)  Wt(kg): --    Weight (kg): 90.8 (12-22-22 @ 05:00)    12-22-22 @ 07:01  -  12-23-22 @ 07:00  --------------------------------------------------------  IN: 357.7 mL / OUT: 20 mL / NET: 337.7 mL    Physical Exam:  	Gen: ill appearing  	HEENT: Scleral icterus  	Pulm: CTA B/L  	CV: S1S2  	Abd: Soft, +BS   	Ext: pitting LE edema B/L  	Neuro: not awake   	Skin: jaundice  	Vascular access: RIJ non-tunneled HD catheter    LABS/STUDIES  --------------------------------------------------------------------------------              7.1    21.29 >-----------<  64       [12-23-22 @ 02:07]              21.8     135  |  97  |  94  ----------------------------<  127      [12-23-22 @ 02:07]  4.7   |  16  |  5.86        Ca     8.5     [12-23-22 @ 02:07]      Mg     2.60     [12-23-22 @ 02:07]      Phos  7.1     [12-23-22 @ 02:07]    TPro  4.7  /  Alb  1.9  /  TBili  35.6  /  DBili  x   /  AST  4837  /  ALT  1378  /  AlkPhos  1057  [12-23-22 @ 02:07]    PT/INR: PT 19.1 , INR 1.64       [12-23-22 @ 02:07]  PTT: 40.8       [12-23-22 @ 02:07]      Creatinine Trend:  SCr 5.86 [12-23 @ 02:07]  SCr 5.36 [12-22 @ 11:44]  SCr 4.82 [12-22 @ 02:50]  SCr 5.69 [12-21 @ 04:19]  SCr 4.70 [12-20 @ 01:30]    Urinalysis - [12-18-22 @ 10:39]      Color Katina / Appearance Turbid / SG 1.021 / pH 6.0      Gluc Negative / Ketone Trace  / Bili Large / Urobili <2 mg/dL       Blood Large / Protein 100 mg/dL / Leuk Est Negative / Nitrite Negative      RBC 10 / WBC 2 / Hyaline  / Gran  / Sq Epi  / Non Sq Epi 1 / Bacteria Few    Urine Creatinine 115      [12-18-22 @ 10:39]  Urine Sodium 42      [12-18-22 @ 10:39]  Urine Urea Nitrogen 394.0      [12-18-22 @ 10:39]    TSH 0.28      [12-19-22 @ 07:00]    HBsAb <3.0      [12-15-22 @ 23:40]  HBsAg Nonreact      [12-15-22 @ 23:40]  HBcAb Nonreact      [12-15-22 @ 23:40]

## 2022-12-23 NOTE — PROGRESS NOTE ADULT - PROBLEM SELECTOR PLAN 1
Pt with severe DEMARCO in the setting of hypotension, decreased PO intake, and hyperbilirubinemia. Pt denies any prior known history of kidney disease. Upon review of [facility], Scr was initially 7.29 on 12/15/22. UA shows hematuria, and proteinuria. Bilirubin level significantly elevated at 32.7. No evidence of obstruction on CT abdomen. 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 Pt received first session of HD session on 12/15/22 for hyperkalemia and worsening metabolic acidosis. Spot urine TP/CR is elevated at 1.1. Renal US ruled out hydronephrosis. Labs reviewed. Last HD was 12/21/22. Pt currently on IV vasopressors. Pt. still without evidence of renal recovery. Will plan for HD today. Monitor labs and urine output. Dose medications as per eGFR.    If any questions, please feel free to contact me     Carson Alexandre  Nephrology Fellow  [facility] Pager: [phone]

## 2022-12-23 NOTE — PROGRESS NOTE ADULT - ATTENDING COMMENTS
agree with above.  critically ill without signs of kidney function recovery.  continue dialysis three days a week.

## 2022-12-23 NOTE — PROGRESS NOTE ADULT - ASSESSMENT
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     #Neuro  A&Ox3  - Pt somewhat improved mentation i/s/o uremia in setting of renal failure  - ammonia not elevated, will continue to trend in the AM  - on meropenem/ vanc by level for possible infection.  - ABG without hypercapnia, serial VBGs unchanged  - although neuro exam not indicative of local stroke and w/o hx of brain mets, in the setting of increased coagulopathy will need to consider CTH if neuro exam changes, had outpatient MRI 10/4/2021 w/o evidence of intracranial metases, had some chronic microvascular diseases  for pain control only tolerating decreased levels of dilaudid will continue intermittent 0.25 dilaudid IVP as necessary      #PULMONARY   CT with consolidation in lingula and RML - atelectasis vs. pneumonia  CXR with right base atelectasis vs infiltrate   - Saturating well on minimal oxygen supplementation may have some component of central apnea vs VAL[place] as patient becomes mildly hypoxemic when oversedated  - continue to monitor with worsening mental; status       #CV  - patient with worsening oncotic pressure and vasoplegia secondary to worsening liver function in the setting of possible septic shock  - midodrine 30 q8 ordered however not taking due to lack of mental status to take po medications   - titrate levophed to sustain MAP>65  - patient also with cardiac dysfunction with regards to troponinemia with ABBE not a candidate for apt, AC, cath. should keep Hgb> 10 due to active ischemia  - repeat formal echo: grossly normal LV SF, RVSF, no WMA or diastolic dysfxn  - stress dose steroids 50 q6 started 12/20: has mid day cortisol level indeterminate range, increasing pressors, borderline low sugars,       #GI  Malignant obstruction; Alk phos 600 on admission  - CT 12/15 c/f cholangiocarcinoma metastatic to liver, gallbladder abnormalities, adrenal enlargement , pancreatic enlargement. 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 Outpatient, patient was evaled by oncologist with worsening abdominal mass and was recommended for CT. Received CT at hospital revealing new cholangiocarcinoma. Was planning hospice and chemo was stopped as uterine cancer was refractory to treatment  - Surgery with no plans on interventions   - GI recommending MR MRCP pending vs  IR for more urgent biliary decompression (+/- biopsy) however likely too unstable will discuss with family and IR: would require intubation and improvement in stability and no longer planning for endoscopic eval  - Ca 199 not elevated, CEA not elevated,  elevated at 1031  - Pain management as needed   - continues to be going into active liver failure MELD NA: 38, low plt, elevated INR  - NPO   - RUQ ultrasound: Bile ducts: Common bile duct contains debris and measures 0.8 cm.  Distended gallbladder with sludge and thick wall    #/RENAL  No history of kidney disease, admission SCr 7.29 in Acute Renal Failure with hyperkalemia, AGMA  - possible ATN vs bile cast nephropathy vs pre-renal etiology.   - Urine protein, urine creatinine, and urine sodium wnl   - renal US without hydro  - Strict I&O's   - no plans on CRRT although will attempt pressor assisted HD 12/21      #INFECTIOUS DISEASE   - Afebrile, no leukocytosis, RVP negative however consolidation noted in lingula/RML atelectasis vs. pneumonia  - Monitor for cholangitis   - MRSA swab negative, blood cultures NGTD, U culture with <100K pansensitive Ecoli   - Adjust antibiotics per HD  - initially zosyn, escalated to meropenem for increasing WBC, worsening pressors,   - added vanc by level    #Endocrine   Enlarged adrenal glands  - cortisol remains at 14  - thyroid studies TSH T4 normal T3 low  - stress dose steroids 50 q6 started 12/20: has mid day cortisol level indeterminate range, increasing pressors, borderline low sugars,   - may need to initiate D10     #Hematology/ppx   Noted to have vaginal bleeding, recent epistaxis in setting of uremia. Improving s/p dialysis  INR elevated with mild bleeding from site, Monitor coags, improved after vit K  maintain active type and screen, keep Hgb >10 given active ischemia  possible PVT on renal US, will need to follow up  Onc to weigh in on second opinion.    #DVT AND GI PROPHYLAXIS  Hold chemo PPX iso uremic platelet dysfunction, and autocoagulopathy  SCDs    #GOC:   -Full code. Daughter is NOK per patient. have had multiple discussions with family both immediate and extended family, will continue to speak. please see [facility] notes for further details   -Onc consulted for second opinion, gave records. No further treatment from onc. If pt improves, can go to [facility].      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     #Neuro  A&Ox3  - Pt somewhat improved mentation i/s/o uremia in setting of renal failure  - ammonia not elevated, will continue to trend in the AM  - on meropenem/ vanc by level for possible infection.  - ABG without hypercapnia, serial VBGs unchanged  - although neuro exam not indicative of local stroke and w/o hx of brain mets, in the setting of increased coagulopathy will need to consider CTH if neuro exam changes, had outpatient MRI 10/4/2021 w/o evidence of intracranial metases, had some chronic microvascular diseases  for pain control only tolerating decreased levels of dilaudid will continue intermittent 0.25 dilaudid IVP as necessary      #PULMONARY   CT with consolidation in lingula and RML - atelectasis vs. pneumonia  CXR with right base atelectasis vs infiltrate   - Saturating well on minimal oxygen supplementation may have some component of central apnea vs VAL[place] as patient becomes mildly hypoxemic when oversedated  - continue to monitor with worsening mental; status       #CV  - patient with worsening oncotic pressure and vasoplegia secondary to worsening liver function in the setting of possible septic shock  - midodrine 30 q8 ordered however not taking due to lack of mental status to take po medications   - titrate levophed to sustain MAP>65  - patient also with cardiac dysfunction with regards to troponinemia with ABBE not a candidate for apt, AC, cath. should keep Hgb> 10 due to active ischemia  - repeat formal echo: grossly normal LV SF, RVSF, no WMA or diastolic dysfxn  - stress dose steroids 50 q6 started 12/20: has mid day cortisol level indeterminate range, increasing pressors, borderline low sugars,       #GI  Malignant obstruction; Alk phos 600 on admission  - CT 12/15 c/f cholangiocarcinoma metastatic to liver, gallbladder abnormalities, adrenal enlargement , pancreatic enlargement. 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 Outpatient, patient was evaled by oncologist with worsening abdominal mass and was recommended for CT. Received CT at hospital revealing new cholangiocarcinoma. Was planning hospice and chemo was stopped as uterine cancer was refractory to treatment  - Surgery with no plans on interventions   - GI initially recommending MR MRCP pending vs  IR for more urgent biliary decompression (+/- biopsy) however likely too unstable will discuss with family and IR: would require intubation and improvement in stability and no longer planning for endoscopic eval  - Ca 199 not elevated, CEA not elevated,  elevated at 1031  - Pain management as needed   - continues to be going into active liver failure MELD NA: 38, low plt, elevated INR  - NPO due to mental status, did not tolerate tube feed tube placement and was asked to stop by patient herself  - RUQ ultrasound: Bile ducts: Common bile duct contains debris and measures 0.8 cm.  Distended gallbladder with sludge and thick wall    #/RENAL  No history of kidney disease, admission SCr 7.29 in Acute Renal Failure with hyperkalemia, AGMA  - possible ATN vs bile cast nephropathy vs pre-renal etiology.   - Urine protein, urine creatinine, and urine sodium wnl   - renal US without hydro  - Strict I&O's   - no plans on CRRT although will attempt pressor assisted HD continue       #INFECTIOUS DISEASE   - Afebrile, no leukocytosis, RVP negative however consolidation noted in lingula/RML atelectasis vs. pneumonia  - Monitor for cholangitis   - MRSA swab negative, blood cultures NGTD, U culture with <100K pansensitive Ecoli   - Adjust antibiotics per HD  - initially zosyn, escalated to meropenem for increasing WBC, worsening pressors,   - added vanc by level    #Endocrine   Enlarged adrenal glands  - cortisol remains at 14  - thyroid studies TSH T4 normal T3 low  - stress dose steroids 50 q6 started 12/20: has mid day cortisol level indeterminate range, increasing pressors, borderline low sugars,   - may need to initiate D10     #Hematology/ppx   Noted to have vaginal bleeding, recent epistaxis in setting of uremia. Improving s/p dialysis  INR elevated with mild bleeding from site, Monitor coags, improved after vit K  maintain active type and screen, keep Hgb >10 given active ischemia  possible PVT on renal US, will need to follow up  Onc to weigh in on second opinion.  dropping platelets in setting of sepsis vs liver dysfxn    #DVT AND GI PROPHYLAXIS  Hold chemo PPX iso uremic platelet dysfunction, and autocoagulopathy  SCDs    #[facility]:   -Full code. Daughter is NOK per patient. have had multiple discussions with family both immediate and extended family, will continue to speak. please see [facility] notes for further details. daily updates given  -Onc consulted for second opinion, gave records. No further treatment from onc. If pt improves, can go to [facility].

## 2022-12-24 NOTE — PROGRESS NOTE ADULT - PROVIDER SPECIALTY LIST ADULT
Critical Care
MICU
Critical Care
Cardiology
Gastroenterology
Gastroenterology
MICU
Gastroenterology
Gastroenterology
MICU
MICU
Nephrology
Surgery
Surgery
MICU
Nephrology
Palliative Care
Nephrology
Nephrology

## 2022-12-24 NOTE — CHART NOTE - NSCHARTNOTEFT_GEN_A_CORE
DEATH NOTE    Called to bedside to evaluate the patient for no pulse, respirations, or blood pressure.    On physical exam, patient did not respond to verbal or noxious stimuli.  No spontaneous respirations.  Absent heart and breath sounds.  Absent radial and carotid pulses.   Pupils are fixed and dilated, no corneal reflex.  EKG rhythm strip shows asystole.  Patient pronounced dead at 19:34. Attending notified.

## 2022-12-24 NOTE — PROGRESS NOTE ADULT - ATTENDING COMMENTS
67 F with uterine ca, history as above here with presumed septic shock, DEMARCO, liver failure  today, worsening tachypnea, HAGMA, lactic acidosis  Pt with poor overall prognosis  family aware - patient made DNR/DNI, medical management and transition to comfort care when family arrives  appears comfortable at time of eval during day time  c/w vasopressors  Critically ill patient requiring frequent bedside visits with therapy changes.

## 2022-12-24 NOTE — PROGRESS NOTE ADULT - REASON FOR ADMISSION
malignant obstruction, renal and liver failure

## 2022-12-24 NOTE — PROGRESS NOTE ADULT - ATTENDING SUPERVISION STATEMENT
Fellow
Resident
Fellow
Resident
Fellow
Resident
Fellow

## 2022-12-24 NOTE — PROGRESS NOTE ADULT - NUTRITIONAL ASSESSMENT
This patient has been assessed with a concern for Malnutrition and has been determined to have a diagnosis/diagnoses of Severe protein-calorie malnutrition.    This patient is being managed with:   Diet NPO-  Except Medications  With Ice Chips/Sips of Water  Entered: Dec 18 2022  8:47PM    
This patient has been assessed with a concern for Malnutrition and has been determined to have a diagnosis/diagnoses of Severe protein-calorie malnutrition.    This patient is being managed with:   Diet NPO-  Except Medications  With Ice Chips/Sips of Water  Entered: Dec 18 2022  8:47PM    
This patient has been assessed with a concern for Malnutrition and has been determined to have a diagnosis/diagnoses of Severe protein-calorie malnutrition.    This patient is being managed with:   Diet NPO-  With Ice Chips/Sips of Water  Entered: Dec 22 2022  5:41PM    
This patient has been assessed with a concern for Malnutrition and has been determined to have a diagnosis/diagnoses of Severe protein-calorie malnutrition.    This patient is being managed with:   Diet NPO-  Except Medications  With Ice Chips/Sips of Water  Entered: Dec 18 2022  8:47PM    
This patient has been assessed with a concern for Malnutrition and has been determined to have a diagnosis/diagnoses of Severe protein-calorie malnutrition.    This patient is being managed with:   Diet NPO-  Except Medications  With Ice Chips/Sips of Water  Entered: Dec 18 2022  8:47PM    
This patient has been assessed with a concern for Malnutrition and has been determined to have a diagnosis/diagnoses of Severe protein-calorie malnutrition.    This patient is being managed with:   Diet NPO-  With Ice Chips/Sips of Water  Entered: Dec 22 2022  5:41PM

## 2022-12-24 NOTE — DISCHARGE NOTE FOR THE EXPIRED PATIENT - HOSPITAL COURSE
67yoF PMH uterine cancer s/p hysterectomy and chemo/RT BIBEMS to [facility] p/w 2 wks painless jaundice and progressive FTT, epistaxis, vaginal bleeding, found to have metastatic cholangiocarcinoma and acute renal failure and transferred to [facility] for further mgmt. Pt was also found to be in a shock state requiring pressors. Pt received urgent HD for acute kidney failure on 12/15 and  and continued receiving intermittent HD throughout her hospital stay without evidence of renal recovery. GI and surgery were consulted for hyperbilirubinemia c/f cholangiocarcinoma, however patient was never stable enough to receive endoscopic intervention. IR was consulted for possibility of percutaneous cholecystostomy tube, however patient was not stable enough to receive CT scan for procedural planning. Oncology was also consulted as the patient's family was displeased with the patient's prior oncologist and wanted a second opinion. Per oncology, patient would need additional studies and evaluation to determine full extent of disease; however, patient was in poor clinical condition and additional procedures would likely not have added benefit to her survival. Palliative consult was placed and ongoing GOC discussions were had with the family. Patient continued to have worsening renal failure despite dialysis and worsening hepatic failure as well as a sharp increase in her lactate level. She continued to have worsening mental status and worsening hemodynamics. Repeat GOC discussion was had to inform patient's family of worsening status, and family amenable to making pt DNR/DNI and pursuing comfort measures. Patient  on _____ @ ______    67yoF PMH uterine cancer s/p hysterectomy and chemo/RT BIBEMS to [facility] p/w 2 wks painless jaundice and progressive FTT, epistaxis, vaginal bleeding, found to have metastatic cholangiocarcinoma and acute renal failure and transferred to [facility] for further mgmt. Pt was also found to be in a shock state requiring pressors. Pt received urgent HD for acute kidney failure on 12/15 and  and continued receiving intermittent HD throughout her hospital stay without evidence of renal recovery. GI and surgery were consulted for hyperbilirubinemia c/f cholangiocarcinoma, however patient was never stable enough to receive endoscopic intervention. IR was consulted for possibility of percutaneous cholecystostomy tube, however patient was not stable enough to receive CT scan for procedural planning. Oncology was also consulted as the patient's family was displeased with the patient's prior oncologist and wanted a second opinion. Per oncology, patient would need additional studies and evaluation to determine full extent of disease; however, patient was in poor clinical condition and additional procedures would likely not have added benefit to her survival. Palliative consult was placed and ongoing GOC discussions were had with the family. Patient continued to have worsening renal failure despite dialysis and worsening hepatic failure as well as a sharp increase in her lactate level. She continued to have worsening mental status and worsening hemodynamics. Repeat GOC discussion was had to inform patient's family of worsening status, and family amenable to making pt DNR/DNI and pursuing comfort measures. Patient  on 2022 @ 1935

## 2022-12-24 NOTE — CHART NOTE - NSCHARTNOTESELECT_GEN_ALL_CORE
Death Note/Event Note
GOC discussion/Event Note
IR
IR
IR Resident/Off Service Note
MICU transfer/Transfer Note
POCUS/Event Note
Transfer Note
Ultrasound Note

## 2022-12-24 NOTE — PROGRESS NOTE ADULT - SUBJECTIVE AND OBJECTIVE BOX
[facility] DIVISION OF KIDNEY DISEASES AND HYPERTENSION -- FOLLOW UP NOTE  --------------------------------------------------------------------------------  HPI: 66 yo F with history of uterine cancer, received chemotherapy and radiation (last treatment ~4 months ago at [facility]) initially presented to [facility] on 12/14 for painless jaundice, decreased appetite, nausea/vomiting, and generalized weakness. 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 Pt was transferred to [facility] on 12/15 for GI evaluation and possible ERCP. Upon review of [facility], Scr was initially 7.29 on 12/15/22. Pt received HD session on 12/15/22 for hyperkalemia and worsening metabolic acidosis.  Last HD session was 12/23.    Pt was seen and evaluated this morning in the CTICU. Pt is lethargic and not very verbally responsive. Unable to obtain ROS.     PAST HISTORY  --------------------------------------------------------------------------------  No significant changes to PMH, PSH, FHx, SHx, unless otherwise noted    ALLERGIES & MEDICATIONS  --------------------------------------------------------------------------------  Allergies    No Known Allergies    Intolerances      Standing Inpatient Medications  chlorhexidine 2% Cloths 1 Application(s) Topical <User Schedule>  dextrose 50% Injectable 25 Gram(s) IV Push once  dextrose 50% Injectable 12.5 Gram(s) IV Push once  dextrose 50% Injectable 25 Gram(s) IV Push once  dextrose Oral Gel 15 Gram(s) Oral once  glucagon  Injectable 1 milliGRAM(s) IntraMuscular once  hydrocortisone sodium succinate Injectable 50 milliGRAM(s) IV Push every 6 hours  meropenem  IVPB 500 milliGRAM(s) IV Intermittent every 24 hours  midodrine 30 milliGRAM(s) Oral every 8 hours  norepinephrine Infusion 0.05 MICROgram(s)/kG/Min IV Continuous <Continuous>    PRN Inpatient Medications      REVIEW OF SYSTEMS  Unable to obtain     VITALS/PHYSICAL EXAM  --------------------------------------------------------------------------------  T(C): 36.6 (12-23-22 @ 23:30), Max: 37 (12-23-22 @ 08:00)  HR: 89 (12-24-22 @ 06:00) (66 - 91)  BP: 93/63 (12-24-22 @ 06:00) (71/61 - 114/61)  RR: 26 (12-24-22 @ 06:00) (19 - 35)  SpO2: 97% (12-24-22 @ 03:00) (96% - 100%)  Wt(kg): --        12-23-22 @ 07:01  -  12-24-22 @ 07:00  --------------------------------------------------------  IN: 813.2 mL / OUT: 130 mL / NET: 683.2 mL        Physical Exam:  	Gen: ill appearing  	HEENT: Scleral icterus  	Pulm: CTA B/L  	CV: S1S2  	Abd: Soft, +BS   	Ext: pitting LE edema B/L  	Neuro: not awake   	Skin: jaundice  	Vascular access: RIJ non-tunneled HD catheter    LABS/STUDIES  --------------------------------------------------------------------------------              8.2    32.57 >-----------<  57       [12-24-22 @ 04:00]              24.4     136  |  95  |  80  ----------------------------<  121      [12-24-22 @ 04:00]  4.6   |  14  |  4.52        Ca     8.4     [12-24-22 @ 04:00]      Mg     2.50     [12-24-22 @ 04:00]      Phos  6.1     [12-24-22 @ 04:00]    TPro  4.8  /  Alb  2.0  /  TBili  34.5  /  DBili  x   /  AST  2757  /  ALT  1018  /  AlkPhos  1341  [12-24-22 @ 04:00]    PT/INR: PT 18.4 , INR 1.58       [12-24-22 @ 04:00]  PTT: 39.0       [12-24-22 @ 04:00]      Creatinine Trend:  SCr 4.52 [12-24 @ 04:00]  SCr 5.86 [12-23 @ 02:07]  SCr 5.36 [12-22 @ 11:44]  SCr 4.82 [12-22 @ 02:50]  SCr 5.69 [12-21 @ 04:19]    Urinalysis - [12-18-22 @ 10:39]      Color Katina / Appearance Turbid / SG 1.021 / pH 6.0      Gluc Negative / Ketone Trace  / Bili Large / Urobili <2 mg/dL       Blood Large / Protein 100 mg/dL / Leuk Est Negative / Nitrite Negative      RBC 10 / WBC 2 / Hyaline  / Gran  / Sq Epi  / Non Sq Epi 1 / Bacteria Few    Urine Creatinine 115      [12-18-22 @ 10:39]  Urine Sodium 42      [12-18-22 @ 10:39]  Urine Urea Nitrogen 394.0      [12-18-22 @ 10:39]    TSH 0.28      [12-19-22 @ 07:00]    HBsAb <3.0      [12-15-22 @ 23:40]  HBsAg Nonreact      [12-15-22 @ 23:40]  HBcAb Nonreact      [12-15-22 @ 23:40]

## 2022-12-24 NOTE — GOALS OF CARE CONVERSATION - ADVANCED CARE PLANNING - CONVERSATION DETAILS
Spoke with the patient's daughter and HCP America. Stated given the rising lactate, the patient was progressing and was actively dying. Given her level of disease and advance cancer, stated that the patient would not benefit from further aggressive treatment and the best option would be to focus on comfort. America agreed to comfort measures, and to capping further interventions. Agreed to DNR/DNI status as well.

## 2022-12-24 NOTE — PROGRESS NOTE ADULT - ASSESSMENT
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     #Neuro  A&Ox3  - Pt somewhat improved mentation i/s/o uremia in setting of renal failure  - ammonia not elevated, will continue to trend in the AM  - on meropenem/ vanc by level for possible infection.  - ABG without hypercapnia, serial VBGs unchanged  - although neuro exam not indicative of local stroke and w/o hx of brain mets, in the setting of increased coagulopathy will need to consider CTH if neuro exam changes, had outpatient MRI 10/4/2021 w/o evidence of intracranial metases, had some chronic microvascular diseases  for pain control only tolerating decreased levels of dilaudid will continue intermittent 0.25 dilaudid IVP as necessary      #PULMONARY   CT with consolidation in lingula and RML - atelectasis vs. pneumonia  CXR with right base atelectasis vs infiltrate   - Saturating well on minimal oxygen supplementation may have some component of central apnea vs [place] as patient becomes mildly hypoxemic when oversedated  - continue to monitor with worsening mental; status       #CV  - patient with worsening oncotic pressure and vasoplegia secondary to worsening liver function in the setting of possible septic shock  - midodrine 30 q8 ordered however not taking due to lack of mental status to take po medications   - titrate levophed to sustain MAP>65  - patient also with cardiac dysfunction with regards to troponinemia with ABBE not a candidate for apt, AC, cath. should keep Hgb> 10 due to active ischemia  - repeat formal echo: grossly normal LV SF, RVSF, no WMA or diastolic dysfxn  - stress dose steroids 50 q6 started 12/20: has mid day cortisol level indeterminate range, increasing pressors, borderline low sugars,       #GI  Malignant obstruction; Alk phos 600 on admission  - CT 12/15 c/f cholangiocarcinoma metastatic to liver, gallbladder abnormalities, adrenal enlargement , pancreatic enlargement. 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 Outpatient, patient was evaled by oncologist with worsening abdominal mass and was recommended for CT. Received CT at hospital revealing new cholangiocarcinoma. Was planning hospice and chemo was stopped as uterine cancer was refractory to treatment  - Surgery with no plans on interventions   - GI initially recommending MR MRCP pending vs  IR for more urgent biliary decompression (+/- biopsy) however likely too unstable will discuss with family and IR: would require intubation and improvement in stability and no longer planning for endoscopic eval  - Ca 199 not elevated, CEA not elevated,  elevated at 1031  - Pain management as needed   - continues to be going into active liver failure MELD NA: 38, low plt, elevated INR  - NPO due to mental status, did not tolerate tube feed tube placement and was asked to stop by patient herself  - RUQ ultrasound: Bile ducts: Common bile duct contains debris and measures 0.8 cm.  Distended gallbladder with sludge and thick wall    #/RENAL  No history of kidney disease, admission SCr 7.29 in Acute Renal Failure with hyperkalemia, AGMA  - possible ATN vs bile cast nephropathy vs pre-renal etiology.   - Urine protein, urine creatinine, and urine sodium wnl   - renal US without hydro  - Strict I&O's   - no plans on CRRT although will attempt pressor assisted HD continue       #INFECTIOUS DISEASE   - Afebrile, no leukocytosis, RVP negative however consolidation noted in lingula/RML atelectasis vs. pneumonia  - Monitor for cholangitis   - MRSA swab negative, blood cultures NGTD, U culture with <100K pansensitive Ecoli   - Adjust antibiotics per HD  - initially zosyn, escalated to meropenem for increasing WBC, worsening pressors,   - added vanc by level    #Endocrine   Enlarged adrenal glands  - cortisol remains at 14  - thyroid studies TSH T4 normal T3 low  - stress dose steroids 50 q6 started 12/20: has mid day cortisol level indeterminate range, increasing pressors, borderline low sugars,   - may need to initiate D10     #Hematology/ppx   Noted to have vaginal bleeding, recent epistaxis in setting of uremia. Improving s/p dialysis  INR elevated with mild bleeding from site, Monitor coags, improved after vit K  maintain active type and screen, keep Hgb >10 given active ischemia  possible PVT on renal US, will need to follow up  Onc to weigh in on second opinion.  dropping platelets in setting of sepsis vs liver dysfxn    #DVT AND GI PROPHYLAXIS  Hold chemo PPX iso uremic platelet dysfunction, and autocoagulopathy  SCDs    #[facility]:   -Full code. Daughter is NOK per patient. have had multiple discussions with family both immediate and extended family, will continue to speak. please see [facility] notes for further details. daily updates given  -Onc consulted for second opinion, gave records. No further treatment from onc. If pt improves, can go to [facility].      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 Patient has increased pressor requirements today, worsening lactate, mental status is poor.     #Neuro  A&Ox3  - Pt somewhat improved mentation i/s/o uremia in setting of renal failure  - ammonia not elevated, will continue to trend in the AM  - on meropenem/ vanc by level for possible infection.  - ABG without hypercapnia, serial VBGs unchanged  - although neuro exam not indicative of local stroke and w/o hx of brain mets, in the setting of increased coagulopathy will need to consider CTH if neuro exam changes, had outpatient MRI 10/4/2021 w/o evidence of intracranial metases, had some chronic microvascular diseases  - for pain control only tolerating decreased levels of dilaudid will continue intermittent 0.25 dilaudid IVP as necessary, will consider morphine as necessary       #PULMONARY   CT with consolidation in lingula and RML - atelectasis vs. pneumonia  CXR with right base atelectasis vs infiltrate   - Saturating well on minimal oxygen supplementation may have some component of central apnea vs [place] as patient becomes mildly hypoxemic when oversedated  - continue to monitor with worsening mental status       #CV  - patient with worsening oncotic pressure and vasoplegia secondary to worsening liver function in the setting of possible septic shock  - midodrine 30 q8 ordered however not taking due to lack of mental status to take po medications   - titrate levophed to sustain MAP>65  - patient also with cardiac dysfunction with regards to troponinemia with ABBE not a candidate for apt, AC, cath. should keep Hgb> 10 due to active ischemia  - repeat formal echo: grossly normal LV SF, RVSF, no WMA or diastolic dysfxn  - stress dose steroids 50 q6 started 12/20: has mid day cortisol level indeterminate range, increasing pressors, borderline low sugars,       #GI  Malignant obstruction; Alk phos 600 on admission  - CT 12/15 c/f cholangiocarcinoma metastatic to liver, gallbladder abnormalities, adrenal enlargement , pancreatic enlargement. 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 Outpatient, patient was evaled by oncologist with worsening abdominal mass and was recommended for CT. Received CT at hospital revealing new cholangiocarcinoma. Was planning hospice and chemo was stopped as uterine cancer was refractory to treatment  - Surgery with no plans on interventions   - GI initially recommending MR MRCP pending vs  IR for more urgent biliary decompression (+/- biopsy) however likely too unstable will discuss with family and IR: would require intubation and improvement in stability and no longer planning for endoscopic eval  - Ca 199 not elevated, CEA not elevated,  elevated at 1031  - Pain management as needed   - continues to be going into active liver failure MELD NA: 38, low plt, elevated INR  - NPO due to mental status, did not tolerate tube feed tube placement and was asked to stop by patient herself  - RUQ ultrasound: Bile ducts: Common bile duct contains debris and measures 0.8 cm.  Distended gallbladder with sludge and thick wall    #/RENAL  No history of kidney disease, admission SCr 7.29 in Acute Renal Failure with hyperkalemia, AGMA  - possible ATN vs bile cast nephropathy vs pre-renal etiology.   - Urine protein, urine creatinine, and urine sodium wnl   - renal US without hydro  - Strict I&O's   - no plans on CRRT although will attempt pressor assisted HD continue       #INFECTIOUS DISEASE   - Afebrile, no leukocytosis, RVP negative however consolidation noted in lingula/RML atelectasis vs. pneumonia  - Monitor for cholangitis   - MRSA swab negative, blood cultures NGTD, U culture with <100K pansensitive Ecoli   - Adjust antibiotics per HD  - initially zosyn, escalated to meropenem for increasing WBC, worsening pressors,   - added vanc by level    #Endocrine   Enlarged adrenal glands  - cortisol remains at 14  - thyroid studies TSH T4 normal T3 low  - stress dose steroids 50 q6 started 12/20: has mid day cortisol level indeterminate range, increasing pressors, borderline low sugars,   - may need to initiate D10     #Hematology/ppx   Noted to have vaginal bleeding, recent epistaxis in setting of uremia. Improving s/p dialysis  INR elevated with mild bleeding from site, Monitor coags, improved after vit K  maintain active type and screen, keep Hgb >10 given active ischemia  possible PVT on renal US, will need to follow up  Onc to weigh in on second opinion.  dropping platelets in setting of sepsis vs liver dysfxn    #DVT AND GI PROPHYLAXIS  Hold chemo PPX iso uremic platelet dysfunction, and autocoagulopathy  SCDs    #[facility]:   -Full code. Daughter is NOK per patient. have had multiple discussions with family both immediate and extended family, will continue to speak. please see [facility] notes for further details. daily updates given  -Onc consulted for second opinion, gave records. No further treatment from onc. If pt improves, can go to [facility].   - 12/24: family informed of patient's worsening clinical status and that she is likely approaching end of life. Made DNR/DNI, pressors capped. Will continue to monitor.

## 2022-12-24 NOTE — PROGRESS NOTE ADULT - PROBLEM SELECTOR PROBLEM 1
DEMARCO (acute kidney injury)
Hyperbilirubinemia
DEMARCO (acute kidney injury)
DEMARCO (acute kidney injury)

## 2022-12-24 NOTE — PROGRESS NOTE ADULT - ATTENDING COMMENTS
1. DEMARCO - see above discussion.  Monitor daily for dialysis needs.  2. Hypotension - requiring pressors.  3. Metabolic acidosis - reassess with 12/25 labs

## 2022-12-24 NOTE — PROGRESS NOTE ADULT - SUBJECTIVE AND OBJECTIVE BOX
INTERVAL HPI/OVERNIGHT EVENTS:    SUBJECTIVE: Patient seen and examined at bedside.       VITAL SIGNS:  ICU Vital Signs Last 24 Hrs  T(C): 36.9 (24 Dec 2022 04:00), Max: 37 (23 Dec 2022 08:00)  T(F): 98.4 (24 Dec 2022 04:00), Max: 98.6 (23 Dec 2022 08:00)  HR: 89 (24 Dec 2022 06:00) (66 - 91)  BP: 93/63 (24 Dec 2022 06:00) (71/61 - 114/61)  BP(mean): 73 (24 Dec 2022 06:00) (62 - 97)  ABP: --  ABP(mean): --  RR: 26 (24 Dec 2022 06:00) (19 - 35)  SpO2: 97% (24 Dec 2022 03:00) (96% - 100%)    O2 Parameters below as of 24 Dec 2022 06:00  Patient On (Oxygen Delivery Method): nasal cannula  O2 Flow (L/min): 2          Plateau pressure:   P/F ratio:     12-23 @ 07:01  -  12-24 @ 07:00  --------------------------------------------------------  IN: 813.2 mL / OUT: 130 mL / NET: 683.2 mL      CAPILLARY BLOOD GLUCOSE        ECG:    PHYSICAL EXAM:    General:   HEENT:   Neck:   Respiratory:   Cardiovascular:   Abdomen:   Extremities:  Neurological:    MEDICATIONS:  MEDICATIONS  (STANDING):  chlorhexidine 2% Cloths 1 Application(s) Topical <User Schedule>  dextrose 50% Injectable 25 Gram(s) IV Push once  dextrose 50% Injectable 12.5 Gram(s) IV Push once  dextrose 50% Injectable 25 Gram(s) IV Push once  dextrose Oral Gel 15 Gram(s) Oral once  glucagon  Injectable 1 milliGRAM(s) IntraMuscular once  hydrocortisone sodium succinate Injectable 50 milliGRAM(s) IV Push every 6 hours  meropenem  IVPB 500 milliGRAM(s) IV Intermittent every 24 hours  midodrine 30 milliGRAM(s) Oral every 8 hours  norepinephrine Infusion 0.05 MICROgram(s)/kG/Min (7.16 mL/Hr) IV Continuous <Continuous>    MEDICATIONS  (PRN):      ALLERGIES:  Allergies    No Known Allergies    Intolerances        LABS:                        8.2    32.57 )-----------( 57       ( 24 Dec 2022 04:00 )             24.4     12-24    136  |  95<L>  |  80<H>  ----------------------------<  121<H>  4.6   |  14<L>  |  4.52<H>    Ca    8.4      24 Dec 2022 04:00  Phos  6.1     12-24  Mg     2.50     12-24    TPro  4.8<L>  /  Alb  2.0<L>  /  TBili  34.5<H>  /  DBili  x   /  AST  2757<H>  /  ALT  1018<H>  /  AlkPhos  1341<H>  12-24    PT/INR - ( 24 Dec 2022 04:00 )   PT: 18.4 sec;   INR: 1.58 ratio         PTT - ( 24 Dec 2022 04:00 )  PTT:39.0 sec      RADIOLOGY & ADDITIONAL TESTS: Reviewed.   INTERVAL HPI/OVERNIGHT EVENTS: Got 1u pRBC with HD. Attempted to place NGT but patient could not tolerate.     SUBJECTIVE: Patient seen and examined at bedside.       VITAL SIGNS:  ICU Vital Signs Last 24 Hrs  T(C): 36.9 (24 Dec 2022 04:00), Max: 37 (23 Dec 2022 08:00)  T(F): 98.4 (24 Dec 2022 04:00), Max: 98.6 (23 Dec 2022 08:00)  HR: 89 (24 Dec 2022 06:00) (66 - 91)  BP: 93/63 (24 Dec 2022 06:00) (71/61 - 114/61)  BP(mean): 73 (24 Dec 2022 06:00) (62 - 97)  ABP: --  ABP(mean): --  RR: 26 (24 Dec 2022 06:00) (19 - 35)  SpO2: 97% (24 Dec 2022 03:00) (96% - 100%)    O2 Parameters below as of 24 Dec 2022 06:00  Patient On (Oxygen Delivery Method): nasal cannula  O2 Flow (L/min): 2          Plateau pressure:   P/F ratio:     12-23 @ 07:01  -  12-24 @ 07:00  --------------------------------------------------------  IN: 813.2 mL / OUT: 130 mL / NET: 683.2 mL      CAPILLARY BLOOD GLUCOSE        ECG:    PHYSICAL EXAM:    GENERAL: lethargic, lying in bed comfortably   HEAD:  Normocephalic  EYES: Sclera clear  ENT: Moist mucous membranes  NECK: Supple, No JVD  CHEST/LUNG: Clear to auscultation bilaterally; No rales, rhonchi, wheezing, or rubs. Unlabored respirations  HEART: Regular rate and rhythm; No murmurs, rubs, or gallops  ABDOMEN: BSx4; Soft, nontender, nondistended  EXTREMITIES:  2+ Peripheral Pulses, brisk capillary refill. No clubbing, cyanosis, or edema  NERVOUS SYSTEM:  A&Ox3, no focal deficits   SKIN: No rashes or lesions    MEDICATIONS:  MEDICATIONS  (STANDING):  chlorhexidine 2% Cloths 1 Application(s) Topical <User Schedule>  dextrose 50% Injectable 25 Gram(s) IV Push once  dextrose 50% Injectable 12.5 Gram(s) IV Push once  dextrose 50% Injectable 25 Gram(s) IV Push once  dextrose Oral Gel 15 Gram(s) Oral once  glucagon  Injectable 1 milliGRAM(s) IntraMuscular once  hydrocortisone sodium succinate Injectable 50 milliGRAM(s) IV Push every 6 hours  meropenem  IVPB 500 milliGRAM(s) IV Intermittent every 24 hours  midodrine 30 milliGRAM(s) Oral every 8 hours  norepinephrine Infusion 0.05 MICROgram(s)/kG/Min (7.16 mL/Hr) IV Continuous <Continuous>    MEDICATIONS  (PRN):      ALLERGIES:  Allergies    No Known Allergies    Intolerances        LABS:                        8.2    32.57 )-----------( 57       ( 24 Dec 2022 04:00 )             24.4     12-24    136  |  95<L>  |  80<H>  ----------------------------<  121<H>  4.6   |  14<L>  |  4.52<H>    Ca    8.4      24 Dec 2022 04:00  Phos  6.1     12-24  Mg     2.50     12-24    TPro  4.8<L>  /  Alb  2.0<L>  /  TBili  34.5<H>  /  DBili  x   /  AST  2757<H>  /  ALT  1018<H>  /  AlkPhos  1341<H>  12-24    PT/INR - ( 24 Dec 2022 04:00 )   PT: 18.4 sec;   INR: 1.58 ratio         PTT - ( 24 Dec 2022 04:00 )  PTT:39.0 sec      RADIOLOGY & ADDITIONAL TESTS: Reviewed.

## 2022-12-24 NOTE — PROGRESS NOTE ADULT - PROBLEM SELECTOR PLAN 1
Pt with severe oliguric DEMARCO in the setting of hypotension, decreased PO intake, and hyperbilirubinemia. Pt denies any prior known history of kidney disease. Upon review of [facility], Scr was initially 7.29 on 12/15/22. UA shows hematuria, and proteinuria. Bilirubin level significantly elevated at 32.7. No evidence of obstruction on CT abdomen. 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 Pt received first session of HD session on 12/15/22 for hyperkalemia and worsening metabolic acidosis. Spot urine TP/CR is elevated at 1.1. Renal US ruled out hydronephrosis. Labs reviewed. Last HD was 12/23/22. Pt currently on IV vasopressors. Pt. still without evidence of renal recovery. ***. Monitor labs and urine output. Dose medications as per eGFR. Pt with severe oliguric DEMARCO in the setting of hypotension, decreased PO intake, and hyperbilirubinemia. Pt denies any prior known history of kidney disease. Upon review of [facility], Scr was initially 7.29 on 12/15/22. UA shows hematuria, and proteinuria. Bilirubin level significantly elevated at 32.7. No evidence of obstruction on CT abdomen. 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 Pt received first session of HD session on 12/15/22 for hyperkalemia and worsening metabolic acidosis. Spot urine TP/CR is elevated at 1.1. Renal US ruled out hydronephrosis. Labs reviewed. Last HD was 12/23/22. Pt currently on IV vasopressors. Pt. still without evidence of renal recovery. No plan for HD today, given her current hemodynamics uncsure if she will be able to tolerate it. Monitor labs and urine output. Dose medications as per eGFR.    If any questions, please feel free to contact me     Carson Alexandre  Nephrology Fellow  [facility] Pager: [phone]
